# Patient Record
Sex: FEMALE | Race: WHITE | Employment: UNEMPLOYED | URBAN - METROPOLITAN AREA
[De-identification: names, ages, dates, MRNs, and addresses within clinical notes are randomized per-mention and may not be internally consistent; named-entity substitution may affect disease eponyms.]

---

## 2018-03-22 ENCOUNTER — APPOINTMENT (OUTPATIENT)
Dept: GENERAL RADIOLOGY | Age: 48
End: 2018-03-22
Payer: COMMERCIAL

## 2018-03-22 ENCOUNTER — HOSPITAL ENCOUNTER (EMERGENCY)
Age: 48
Discharge: HOME | End: 2018-03-22
Payer: COMMERCIAL

## 2018-03-22 VITALS
RESPIRATION RATE: 20 BRPM | OXYGEN SATURATION: 97 % | SYSTOLIC BLOOD PRESSURE: 139 MMHG | DIASTOLIC BLOOD PRESSURE: 84 MMHG | HEART RATE: 105 BPM | TEMPERATURE: 99.9 F

## 2018-03-22 DIAGNOSIS — J40 BRONCHITIS: ICD-10-CM

## 2018-03-22 DIAGNOSIS — J20.9 ACUTE BRONCHITIS, UNSPECIFIED ORGANISM: Primary | ICD-10-CM

## 2018-03-22 DIAGNOSIS — R05.9 COUGH: ICD-10-CM

## 2018-03-22 PROCEDURE — 71046 X-RAY EXAM CHEST 2 VIEWS: CPT

## 2018-03-22 PROCEDURE — 99212 OFFICE O/P EST SF 10 MIN: CPT

## 2018-03-22 RX ORDER — GUAIFENESIN AND DEXTROMETHORPHAN HYDROBROMIDE 1200; 60 MG/1; MG/1
1 TABLET, EXTENDED RELEASE ORAL EVERY 12 HOURS
Qty: 20 TABLET | Refills: 0 | Status: SHIPPED | OUTPATIENT
Start: 2018-03-22 | End: 2021-02-17

## 2018-03-22 RX ORDER — ALBUTEROL SULFATE 90 UG/1
2 AEROSOL, METERED RESPIRATORY (INHALATION) EVERY 6 HOURS PRN
Qty: 1 INHALER | Refills: 0 | Status: SHIPPED | OUTPATIENT
Start: 2018-03-22 | End: 2021-03-11

## 2018-03-22 RX ORDER — AZITHROMYCIN 250 MG/1
TABLET, FILM COATED ORAL
Qty: 6 TABLET | Refills: 0 | Status: SHIPPED | OUTPATIENT
Start: 2018-03-22 | End: 2018-04-01

## 2018-03-22 NOTE — ED PROVIDER NOTES
normocephalic. Eyes: No discharge is present from the eyes. The sclera are normal.  ENT: The oropharynx demonstrates a small amount of erythema bilaterally. There is no tonsillar enlargement nor is there any exudate present. No uvular deviation or edema. No tonsillary asymmetry.  Floor of the mouth soft, no trismus, handling secretions. TMs bilaterally demonstrate no evidence of infection. Neck: Normal range of motion is achieved in the neck. There is no JVD present. No meningeal signs are present   Anterior cervical adenopathy is negative. Respiratory/chest: The chest is nontender. Breath sounds are diminished. There is no respiratory distress.   Cardiovascular: Heart shows a regular rate and rhythm without murmurs clicks or gallops. Abdominal exam: The abdomen is non tender without evidence of peritoneal signs. Specific attention to the left upper quadrant with palpation of the spleen demonstrates no organomegaly or tenderness  Skin: warm and dry, without rash  Neurologic: GCS 15   Psych: Normal Affect  -------------------------------------------------- RESULTS -------------------------------------------------    LABS:  No results found for this visit on 03/22/18. RADIOLOGY:  Interpreted by Radiologist.  XR CHEST STANDARD (2 VW)   Final Result   Normal chest.               ------------------------------ ED COURSE/MEDICAL DECISION MAKING----------------------  Medications - No data to display    ED COURSE:  ED Course          Medical Decision Making:         Upper respiratory infection, sick for 2 weeks, Patient is a smoker. She was advised to stop smoking. Chest x-ray was negative. I will put her on a Z-Cristiano, Mucinex DM, and an albuterol inhaler.   IF she does  not improve or worsens she needs to follow up with her PCP or be reevaluated         This patient's ED course included: a personal history and physicial eaxmination and re-evaluation prior to disposition    This patient has remained hemodynamically stable during their ED course. Counseling: The emergency provider has spoken with the patient and discussed todays results, in addition to providing specific details for the plan of care and counseling regarding the diagnosis and prognosis. Questions are answered at this time and they are agreeable with the plan.       --------------------------------- IMPRESSION AND DISPOSITION ---------------------------------    IMPRESSION  1. Acute bronchitis, unspecified organism    2. Bronchitis    3.  Cough        DISPOSITION  Disposition: Discharge to home  Patient condition is good           Audrey Delvalle NP  03/27/18 [id number]

## 2020-10-05 ENCOUNTER — TELEPHONE (OUTPATIENT)
Dept: ADMINISTRATIVE | Age: 50
End: 2020-10-05

## 2020-10-05 NOTE — TELEPHONE ENCOUNTER
Pt called wanting to establish care as a NP with Dr. Suhas Law. Pt was advised that Dr. Suhas Law is scheduling out to April. Pt stated she was told to call in for an appt and also stated she would like to be seen earlier than April.  Please advise to schedule Pt

## 2020-10-26 ENCOUNTER — HOSPITAL ENCOUNTER (OUTPATIENT)
Age: 50
Discharge: HOME | End: 2020-10-28
Payer: COMMERCIAL

## 2020-10-26 ENCOUNTER — HOSPITAL ENCOUNTER (OUTPATIENT)
Dept: GENERAL RADIOLOGY | Age: 50
Discharge: HOME | End: 2020-10-28
Payer: COMMERCIAL

## 2020-10-26 LAB
ALBUMIN SERPL-MCNC: 4 G/DL (ref 3.5–5.2)
ALP BLD-CCNC: 125 U/L (ref 35–104)
ALT SERPL-CCNC: 49 U/L (ref 0–32)
ANION GAP SERPL CALCULATED.3IONS-SCNC: 12 MMOL/L (ref 7–16)
AST SERPL-CCNC: 61 U/L (ref 0–31)
BACTERIA: ABNORMAL /HPF
BASOPHILS ABSOLUTE: 0.09 E9/L (ref 0–0.2)
BASOPHILS RELATIVE PERCENT: 0.9 % (ref 0–2)
BILIRUB SERPL-MCNC: 0.5 MG/DL (ref 0–1.2)
BILIRUBIN URINE: NEGATIVE
BLOOD, URINE: NEGATIVE
BUN BLDV-MCNC: 8 MG/DL (ref 6–20)
C-REACTIVE PROTEIN: 1 MG/DL (ref 0–0.4)
CALCIUM SERPL-MCNC: 9.9 MG/DL (ref 8.6–10.2)
CHLORIDE BLD-SCNC: 102 MMOL/L (ref 98–107)
CHOLESTEROL, TOTAL: 234 MG/DL (ref 0–199)
CLARITY: CLEAR
CO2: 25 MMOL/L (ref 22–29)
COLOR: YELLOW
CREAT SERPL-MCNC: 0.8 MG/DL (ref 0.5–1)
EOSINOPHILS ABSOLUTE: 0.56 E9/L (ref 0.05–0.5)
EOSINOPHILS RELATIVE PERCENT: 5.7 % (ref 0–6)
EPITHELIAL CELLS, UA: ABNORMAL /HPF
GFR AFRICAN AMERICAN: >60
GFR NON-AFRICAN AMERICAN: >60 ML/MIN/1.73
GLUCOSE BLD-MCNC: 107 MG/DL (ref 74–99)
GLUCOSE URINE: NEGATIVE MG/DL
HCT VFR BLD CALC: 47.2 % (ref 34–48)
HDLC SERPL-MCNC: 41 MG/DL
HEMOGLOBIN: 15.1 G/DL (ref 11.5–15.5)
HOMOCYSTEINE: 11 UMOL/L (ref 0–15)
IMMATURE GRANULOCYTES #: 0.07 E9/L
IMMATURE GRANULOCYTES %: 0.7 % (ref 0–5)
KETONES, URINE: NEGATIVE MG/DL
LDL CHOLESTEROL CALCULATED: 162 MG/DL (ref 0–99)
LEUKOCYTE ESTERASE, URINE: ABNORMAL
LYMPHOCYTES ABSOLUTE: 3.36 E9/L (ref 1.5–4)
LYMPHOCYTES RELATIVE PERCENT: 34.3 % (ref 20–42)
MCH RBC QN AUTO: 31.1 PG (ref 26–35)
MCHC RBC AUTO-ENTMCNC: 32 % (ref 32–34.5)
MCV RBC AUTO: 97.3 FL (ref 80–99.9)
MONOCYTES ABSOLUTE: 0.72 E9/L (ref 0.1–0.95)
MONOCYTES RELATIVE PERCENT: 7.3 % (ref 2–12)
NEUTROPHILS ABSOLUTE: 5 E9/L (ref 1.8–7.3)
NEUTROPHILS RELATIVE PERCENT: 51.1 % (ref 43–80)
NITRITE, URINE: NEGATIVE
PDW BLD-RTO: 13.4 FL (ref 11.5–15)
PH UA: 6.5 (ref 5–9)
PLATELET # BLD: 264 E9/L (ref 130–450)
PMV BLD AUTO: 10 FL (ref 7–12)
POTASSIUM SERPL-SCNC: 4.9 MMOL/L (ref 3.5–5)
PROTEIN UA: NEGATIVE MG/DL
RBC # BLD: 4.85 E12/L (ref 3.5–5.5)
RBC UA: ABNORMAL /HPF (ref 0–2)
SODIUM BLD-SCNC: 139 MMOL/L (ref 132–146)
SPECIFIC GRAVITY UA: 1.02 (ref 1–1.03)
TOTAL PROTEIN: 7.5 G/DL (ref 6.4–8.3)
TRIGL SERPL-MCNC: 155 MG/DL (ref 0–149)
TSH SERPL DL<=0.05 MIU/L-ACNC: 4.92 UIU/ML (ref 0.27–4.2)
UROBILINOGEN, URINE: 0.2 E.U./DL
VITAMIN B-12: 731 PG/ML (ref 211–946)
VITAMIN D 25-HYDROXY: 35 NG/ML (ref 30–100)
VLDLC SERPL CALC-MCNC: 31 MG/DL
WBC # BLD: 9.8 E9/L (ref 4.5–11.5)
WBC UA: ABNORMAL /HPF (ref 0–5)

## 2020-10-26 PROCEDURE — 82607 VITAMIN B-12: CPT

## 2020-10-26 PROCEDURE — 72220 X-RAY EXAM SACRUM TAILBONE: CPT

## 2020-10-26 PROCEDURE — 81001 URINALYSIS AUTO W/SCOPE: CPT

## 2020-10-26 PROCEDURE — 36415 COLL VENOUS BLD VENIPUNCTURE: CPT

## 2020-10-26 PROCEDURE — 83090 ASSAY OF HOMOCYSTEINE: CPT

## 2020-10-26 PROCEDURE — 72100 X-RAY EXAM L-S SPINE 2/3 VWS: CPT

## 2020-10-26 PROCEDURE — 80053 COMPREHEN METABOLIC PANEL: CPT

## 2020-10-26 PROCEDURE — 82306 VITAMIN D 25 HYDROXY: CPT

## 2020-10-26 PROCEDURE — 86140 C-REACTIVE PROTEIN: CPT

## 2020-10-26 PROCEDURE — 83525 ASSAY OF INSULIN: CPT

## 2020-10-26 PROCEDURE — 85025 COMPLETE CBC W/AUTO DIFF WBC: CPT

## 2020-10-26 PROCEDURE — 84443 ASSAY THYROID STIM HORMONE: CPT

## 2020-10-26 PROCEDURE — 80061 LIPID PANEL: CPT

## 2020-10-30 LAB — INSULIN: 27 UIU/ML (ref 3–19)

## 2020-11-02 ENCOUNTER — HOSPITAL ENCOUNTER (OUTPATIENT)
Dept: MAMMOGRAPHY | Age: 50
Discharge: HOME | End: 2020-11-04
Payer: COMMERCIAL

## 2020-11-02 PROCEDURE — 77067 SCR MAMMO BI INCL CAD: CPT

## 2020-11-20 ENCOUNTER — HOSPITAL ENCOUNTER (OUTPATIENT)
Age: 50
Discharge: HOME | End: 2020-11-22
Payer: COMMERCIAL

## 2020-11-20 ENCOUNTER — HOSPITAL ENCOUNTER (OUTPATIENT)
Dept: GENERAL RADIOLOGY | Age: 50
Discharge: HOME | End: 2020-11-22
Payer: COMMERCIAL

## 2020-11-20 ENCOUNTER — HOSPITAL ENCOUNTER (OUTPATIENT)
Dept: ULTRASOUND IMAGING | Age: 50
Discharge: HOME | End: 2020-11-20
Payer: COMMERCIAL

## 2020-11-20 PROCEDURE — 76705 ECHO EXAM OF ABDOMEN: CPT

## 2020-11-20 PROCEDURE — 71046 X-RAY EXAM CHEST 2 VIEWS: CPT

## 2021-02-17 ENCOUNTER — INITIAL CONSULT (OUTPATIENT)
Dept: BARIATRICS/WEIGHT MGMT | Age: 51
End: 2021-02-17
Payer: COMMERCIAL

## 2021-02-17 VITALS
WEIGHT: 250.5 LBS | DIASTOLIC BLOOD PRESSURE: 80 MMHG | RESPIRATION RATE: 20 BRPM | SYSTOLIC BLOOD PRESSURE: 132 MMHG | TEMPERATURE: 97.4 F | HEART RATE: 97 BPM | BODY MASS INDEX: 42.76 KG/M2 | HEIGHT: 64 IN

## 2021-02-17 DIAGNOSIS — K30 INDIGESTION: Primary | ICD-10-CM

## 2021-02-17 DIAGNOSIS — K21.9 GASTROESOPHAGEAL REFLUX DISEASE WITHOUT ESOPHAGITIS: ICD-10-CM

## 2021-02-17 DIAGNOSIS — E66.01 MORBID OBESITY DUE TO EXCESS CALORIES (HCC): ICD-10-CM

## 2021-02-17 PROCEDURE — G8417 CALC BMI ABV UP PARAM F/U: HCPCS | Performed by: SURGERY

## 2021-02-17 PROCEDURE — 99204 OFFICE O/P NEW MOD 45 MIN: CPT | Performed by: SURGERY

## 2021-02-17 PROCEDURE — G8427 DOCREV CUR MEDS BY ELIG CLIN: HCPCS | Performed by: SURGERY

## 2021-02-17 PROCEDURE — G8484 FLU IMMUNIZE NO ADMIN: HCPCS | Performed by: SURGERY

## 2021-02-17 PROCEDURE — 4004F PT TOBACCO SCREEN RCVD TLK: CPT | Performed by: SURGERY

## 2021-02-17 PROCEDURE — 99202 OFFICE O/P NEW SF 15 MIN: CPT

## 2021-02-17 RX ORDER — MULTIVITAMIN WITH IRON
250 TABLET ORAL DAILY
COMMUNITY
End: 2021-11-09

## 2021-02-17 RX ORDER — LURASIDONE HYDROCHLORIDE 60 MG/1
60 TABLET, FILM COATED ORAL EVERY EVENING
COMMUNITY
Start: 2021-01-29

## 2021-02-17 RX ORDER — DULOXETIN HYDROCHLORIDE 30 MG/1
30 CAPSULE, DELAYED RELEASE ORAL
COMMUNITY
Start: 2021-02-03 | End: 2021-12-01

## 2021-02-17 RX ORDER — DULOXETIN HYDROCHLORIDE 60 MG/1
60 CAPSULE, DELAYED RELEASE ORAL NIGHTLY
COMMUNITY
Start: 2021-01-28 | End: 2021-12-01

## 2021-02-17 RX ORDER — OMEPRAZOLE 40 MG/1
40 CAPSULE, DELAYED RELEASE ORAL DAILY
Status: ON HOLD | COMMUNITY
Start: 2021-02-09 | End: 2021-11-15

## 2021-02-17 RX ORDER — LEVOTHYROXINE SODIUM 0.03 MG/1
25 TABLET ORAL DAILY
COMMUNITY
Start: 2021-02-03

## 2021-02-17 RX ORDER — LISINOPRIL 5 MG/1
5 TABLET ORAL DAILY
Status: ON HOLD | COMMUNITY
Start: 2021-02-03 | End: 2021-11-18 | Stop reason: HOSPADM

## 2021-02-17 RX ORDER — HYDROXYZINE PAMOATE 50 MG/1
50 CAPSULE ORAL 3 TIMES DAILY
COMMUNITY
Start: 2021-01-18

## 2021-02-17 RX ORDER — ASCORBIC ACID 1000 MG
1 TABLET ORAL DAILY
COMMUNITY
End: 2021-11-09

## 2021-02-17 RX ORDER — TRAZODONE HYDROCHLORIDE 100 MG/1
100 TABLET ORAL NIGHTLY PRN
COMMUNITY
Start: 2021-01-29

## 2021-02-17 NOTE — PROGRESS NOTES
 traZODone (DESYREL) 100 MG tablet Take 100 tablets by mouth nightly as needed      Coenzyme Q10 (CO Q 10) 10 MG CAPS Take 1 capsule by mouth daily      magnesium (MAGNESIUM-OXIDE) 250 MG TABS tablet Take 250 mg by mouth daily      Calcium Carb-Cholecalciferol (CALCIUM 600 + D PO) Take 1 tablet by mouth daily      Multiple Vitamins-Minerals (ONE-A-DAY FOR HER VITACRAVES PO) Take 1 tablet by mouth daily      DULoxetine (CYMBALTA) 60 MG extended release capsule Take 60 mg by mouth nightly      albuterol sulfate HFA (PROAIR HFA) 108 (90 Base) MCG/ACT inhaler Inhale 2 puffs into the lungs every 6 hours as needed for Wheezing 1 Inhaler 0    pravastatin (PRAVACHOL) 20 MG tablet Take 20 mg by mouth daily      cyclobenzaprine (FLEXERIL) 10 MG tablet Take 10 mg by mouth daily       No current facility-administered medications for this visit. Social History:   TOBACCO:   reports that she has been smoking cigarettes. She has never used smokeless tobacco.  All smokers must join the free smoking cessation program and stop smoking for 3 months before having any Bariatric surgery. ETOH:    reports no history of alcohol use. The patient has a family history that is negative for severe cardiovascular or respiratory issues, negative for reaction to anesthesia.       Review of Systems    Review of Systems - General ROS: negative for - chills, fatigue or malaise  ENT ROS: negative for - hearing change, nasal congestion or nasal discharge  Allergy and Immunology ROS: negative for - hives, itchy/watery eyes or nasal congestion  Hematological and Lymphatic ROS: negative for - blood clots, blood transfusions, bruising or fatigue  Endocrine ROS: negative for - malaise/lethargy, mood swings, palpitations or polydipsia/polyuria  Breast ROS: negative for - new or changing breast lumps or nipple changes  Respiratory ROS: negative for - sputum changes, stridor, tachypnea or wheezing Send copy of H&P to PCP, Christiano Kruger MD

## 2021-02-22 ENCOUNTER — PREP FOR PROCEDURE (OUTPATIENT)
Dept: SURGERY | Age: 51
End: 2021-02-22

## 2021-02-22 RX ORDER — SODIUM CHLORIDE, SODIUM LACTATE, POTASSIUM CHLORIDE, CALCIUM CHLORIDE 600; 310; 30; 20 MG/100ML; MG/100ML; MG/100ML; MG/100ML
INJECTION, SOLUTION INTRAVENOUS CONTINUOUS
Status: CANCELLED | OUTPATIENT
Start: 2021-02-22

## 2021-03-08 ENCOUNTER — HOSPITAL ENCOUNTER (OUTPATIENT)
Age: 51
Discharge: HOME | End: 2021-03-10
Payer: COMMERCIAL

## 2021-03-08 DIAGNOSIS — Z01.818 PREOP TESTING: ICD-10-CM

## 2021-03-08 PROCEDURE — U0003 INFECTIOUS AGENT DETECTION BY NUCLEIC ACID (DNA OR RNA); SEVERE ACUTE RESPIRATORY SYNDROME CORONAVIRUS 2 (SARS-COV-2) (CORONAVIRUS DISEASE [COVID-19]), AMPLIFIED PROBE TECHNIQUE, MAKING USE OF HIGH THROUGHPUT TECHNOLOGIES AS DESCRIBED BY CMS-2020-01-R: HCPCS

## 2021-03-10 LAB
SARS-COV-2: NOT DETECTED
SOURCE: NORMAL

## 2021-03-11 ENCOUNTER — ANESTHESIA EVENT (OUTPATIENT)
Dept: ENDOSCOPY | Age: 51
End: 2021-03-11
Payer: COMMERCIAL

## 2021-03-11 NOTE — PROGRESS NOTES
[address] [facility]                                                                                                                    PRE OP INSTRUCTIONS FOR  Barb Rosas        Date: 3/11/2021    Date of surgery: 03/12/2021   Arrival Time: Hospital will call you between 5pm and 7pm with your final arrival time for surgery    1. Do not eat or drink anything after midnight prior to surgery. This includes no water, chewing gum, mints or ice chips. 2. Take the following medications with a small sip of water on the morning of Surgery: duloxetine-synthroid-flexeril     3. Diabetics may take evening dose of insulin but none after midnight. If you feel symptomatic or low blood sugar morning of surgery drink 1-2 ounces of apple juice only. 4. Aspirin, Ibuprofen, Advil, Naproxen, Vitamin E and other Anti-inflammatory products should be stopped  before surgery  as directed by your physician. Take Tylenol only unless instructed otherwise by your surgeon. 5. Check with your Doctor regarding stopping Plavix, Coumadin, Lovenox, Eliquis, Effient, or other blood thinners. 6. Do not smoke,use illicit drugs and do not drink any alcoholic beverages 24 hours prior to surgery. 7. You may brush your teeth the morning of surgery. DO NOT SWALLOW WATER    8. You MUST make arrangements for a responsible adult to take you home after your surgery. You will not be allowed to leave alone or drive yourself home. It is strongly suggested someone stay with you the first 24 hrs. Your surgery will be cancelled if you do not have a ride home. 9. PEDIATRIC PATIENTS ONLY:  A parent/legal guardian must accompany a child scheduled for surgery and plan to stay at the hospital until the child is discharged. Please do not bring other children with you.     10. Please wear simple, loose fitting clothing to the hospital.  Sigrid Ortega not bring valuables (money, credit cards, checkbooks, etc.) Do not wear any makeup (including no eye makeup) or nail polish on your fingers or toes. 11. DO NOT wear any jewelry or piercings on day of surgery. All body piercing jewelry must be removed. 12. Shower the night before surgery with ___Antibacterial soap /DENVER WIPES________    13. TOTAL JOINT REPLACEMENT/HYSTERECTOMY PATIENTS ONLY---Remember to bring Blood Bank bracelet to the hospital on the day of surgery. 14. If you have a Living Will and Durable Power of  for Healthcare, please bring in a copy. 15. If appropriate bring crutches, inspirex, WALKER, CANE etc... 12. Notify your Surgeon if you develop any illness between now and surgery time, cough, cold, fever, sore throat, nausea, vomiting, etc.  Please notify your surgeon if you experience dizziness, shortness of breath or blurred vision between now & the time of your surgery. 17. If you have ___dentures, they will be removed before going to the OR; we will provide you a container. If you wear ___contact lenses or ___glasses, they will be removed; please bring a case for them. 18. To provide excellent care visitors will be limited to 2 in the room at any given time. 19. Please bring picture ID and insurance card. 20. Sleep apnea patients need to bring CPAP AND SETTINGS to hospital on day of surgery. 21. During flu season no children under the age of 15 are permitted in the hospital for the safety of all patients. 22. The day of your procedure please report to the main lobby information desk and you will be directed where to go. Please call AMBULATORY CARE if you have any further questions.    [facility]     [address]

## 2021-03-11 NOTE — ANESTHESIA PRE PROCEDURE
Department of Anesthesiology  Preprocedure Note       Name:  Merrianne Dakins   Age:  53 y.o.  :  1970                                          MRN:  [id number]         Date:  3/11/2021      Surgeon: Lauren Scott):  Jennifer More MD    Procedure: Procedure(s):  EGD ESOPHAGOGASTRODUODENOSCOPY    Medications prior to admission:   Prior to Admission medications    Medication Sig Start Date End Date Taking?  Authorizing Provider   lisinopril (PRINIVIL;ZESTRIL) 5 MG tablet Take 5 mg by mouth daily 2/3/21  Yes Historical Provider, MD   metFORMIN (GLUCOPHAGE) 500 MG tablet Take 500 mg by mouth daily 2/3/21  Yes Historical Provider, MD   levothyroxine (SYNTHROID) 25 MCG tablet Take 25 mcg by mouth daily 2/3/21  Yes Historical Provider, MD   DULoxetine (CYMBALTA) 30 MG extended release capsule Take 30 mg by mouth every morning (before breakfast) 2/3/21  Yes Historical Provider, MD   hydrOXYzine (VISTARIL) 50 MG capsule Take 50 mg by mouth 3 times daily 21  Yes Historical Provider, MD   LATUDA 60 MG TABS tablet Take 60 mg by mouth every evening 21  Yes Historical Provider, MD   omeprazole (PRILOSEC) 40 MG delayed release capsule Take 40 mg by mouth daily 21  Yes Historical Provider, MD   traZODone (DESYREL) 100 MG tablet Take 100 tablets by mouth nightly as needed 21  Yes Historical Provider, MD   Coenzyme Q10 (CO Q 10) 10 MG CAPS Take 1 capsule by mouth daily   Yes Historical Provider, MD   magnesium (MAGNESIUM-OXIDE) 250 MG TABS tablet Take 250 mg by mouth daily   Yes Historical Provider, MD   Calcium Carb-Cholecalciferol (CALCIUM 600 + D PO) Take 1 tablet by mouth daily   Yes Historical Provider, MD   Multiple Vitamins-Minerals (ONE-A-DAY FOR HER VITACRAVES PO) Take 1 tablet by mouth daily   Yes Historical Provider, MD   DULoxetine (CYMBALTA) 60 MG extended release capsule Take 60 mg by mouth nightly 21  Yes Historical Provider, MD   pravastatin (PRAVACHOL) 20 MG tablet Take 20 mg by mouth daily Yes Historical Provider, MD   cyclobenzaprine (FLEXERIL) 10 MG tablet Take 10 mg by mouth daily   Yes Historical Provider, MD       Current medications:    No current facility-administered medications for this encounter. Current Outpatient Medications   Medication Sig Dispense Refill    lisinopril (PRINIVIL;ZESTRIL) 5 MG tablet Take 5 mg by mouth daily      metFORMIN (GLUCOPHAGE) 500 MG tablet Take 500 mg by mouth daily      levothyroxine (SYNTHROID) 25 MCG tablet Take 25 mcg by mouth daily      DULoxetine (CYMBALTA) 30 MG extended release capsule Take 30 mg by mouth every morning (before breakfast)      hydrOXYzine (VISTARIL) 50 MG capsule Take 50 mg by mouth 3 times daily      LATUDA 60 MG TABS tablet Take 60 mg by mouth every evening      omeprazole (PRILOSEC) 40 MG delayed release capsule Take 40 mg by mouth daily      traZODone (DESYREL) 100 MG tablet Take 100 tablets by mouth nightly as needed      Coenzyme Q10 (CO Q 10) 10 MG CAPS Take 1 capsule by mouth daily      magnesium (MAGNESIUM-OXIDE) 250 MG TABS tablet Take 250 mg by mouth daily      Calcium Carb-Cholecalciferol (CALCIUM 600 + D PO) Take 1 tablet by mouth daily      Multiple Vitamins-Minerals (ONE-A-DAY FOR HER VITACRAVES PO) Take 1 tablet by mouth daily      DULoxetine (CYMBALTA) 60 MG extended release capsule Take 60 mg by mouth nightly      pravastatin (PRAVACHOL) 20 MG tablet Take 20 mg by mouth daily      cyclobenzaprine (FLEXERIL) 10 MG tablet Take 10 mg by mouth daily         Allergies:  No Known Allergies    Problem List:  There is no problem list on file for this patient.       Past Medical History:        Diagnosis Date    Depression     Hyperlipidemia     Morbid (severe) obesity due to excess calories ([address]        Past Surgical History:        Procedure Laterality Date    CARDIAC CATHETERIZATION      CARPAL TUNNEL RELEASE      TONSILLECTOMY AND ADENOIDECTOMY         Social History:    Social History     Tobacco Use  Smoking status: Current Every Day Smoker     Types: Cigarettes    Smokeless tobacco: Never Used   Substance Use Topics    Alcohol use: No                                Ready to quit: Not Answered  Counseling given: Not Answered      Vital Signs (Current): There were no vitals filed for this visit. BP Readings from Last 3 Encounters:   02/17/21 132/80   03/22/18 139/84   09/10/15 144/64       NPO Status:                                                                                 BMI:   Wt Readings from Last 3 Encounters:   02/17/21 250 lb 8 oz (113.6 kg)   09/10/15 242 lb (109.8 kg)     There is no height or weight on file to calculate BMI.    CBC:   Lab Results   Component Value Date    WBC 9.8 10/26/2020    RBC 4.85 10/26/2020    HGB 15.1 10/26/2020    HCT 47.2 10/26/2020    MCV 97.3 10/26/2020    RDW 13.4 10/26/2020     10/26/2020       CMP:   Lab Results   Component Value Date     10/26/2020    K 4.9 10/26/2020     10/26/2020    CO2 25 10/26/2020    BUN 8 10/26/2020    CREATININE 0.8 10/26/2020    GFRAA >60 10/26/2020    LABGLOM >60 10/26/2020    GLUCOSE 107 10/26/2020    PROT 7.5 10/26/2020    CALCIUM 9.9 10/26/2020    BILITOT 0.5 10/26/2020    ALKPHOS 125 10/26/2020    AST 61 10/26/2020    ALT 49 10/26/2020       POC Tests: No results for input(s): POCGLU, POCNA, POCK, POCCL, POCBUN, POCHEMO, POCHCT in the last 72 hours.     Coags: No results found for: PROTIME, INR, APTT    HCG (If Applicable): No results found for: PREGTESTUR, PREGSERUM, HCG, HCGQUANT     ABGs: No results found for: PHART, PO2ART, UUP4AVI, LYX3UCR, BEART, S7FAOFOR     Type & Screen (If Applicable):  No results found for: LABABO, LABRH    Drug/Infectious Status (If Applicable):  No results found for: HIV, HEPCAB    COVID-19 Screening (If Applicable):   Lab Results   Component Value Date    COVID19 Not Detected 03/08/2021         Anesthesia Evaluation  Patient summary reviewed and Nursing notes reviewed no history of anesthetic complications:   Airway: Mallampati: III  TM distance: >3 FB   Neck ROM: full  Mouth opening: > = 3 FB Dental:          Pulmonary:Negative Pulmonary ROS and normal exam                               Cardiovascular:  Exercise tolerance: good (>4 METS),   (+) hypertension: mild, murmur, hyperlipidemia        Rhythm: regular  Rate: normal           Beta Blocker:  Not on Beta Blocker      ROS comment: Pt. Denies anginal symptoms, respiratory difficulties, major physical restrictions or any recent changes in functional capacity and is at baseline. Neuro/Psych:   (+) psychiatric history: stable with treatmentdepression/anxiety             GI/Hepatic/Renal:   (+) GERD:, morbid obesity (BMI 43 kg/m2)          Endo/Other:    (+) DiabetesType II DM, , .          Pt had no PAT visit       Abdominal:   (+) obese,         Vascular: negative vascular ROS. Anesthesia Plan      MAC     ASA 3       Induction: intravenous. MIPS: Prophylactic antiemetics administered. Anesthetic plan and risks discussed with patient. Plan discussed with CRNA.                   Cali Gibson DO   3/11/2021

## 2021-03-12 ENCOUNTER — HOSPITAL ENCOUNTER (OUTPATIENT)
Dept: ULTRASOUND IMAGING | Age: 51
Discharge: HOME | End: 2021-03-12
Attending: SURGERY
Payer: COMMERCIAL

## 2021-03-12 ENCOUNTER — HOSPITAL ENCOUNTER (OUTPATIENT)
Age: 51
Setting detail: OUTPATIENT SURGERY
Discharge: HOME | End: 2021-03-12
Attending: SURGERY | Admitting: SURGERY
Payer: COMMERCIAL

## 2021-03-12 ENCOUNTER — ANESTHESIA (OUTPATIENT)
Dept: ENDOSCOPY | Age: 51
End: 2021-03-12
Payer: COMMERCIAL

## 2021-03-12 VITALS
HEART RATE: 85 BPM | DIASTOLIC BLOOD PRESSURE: 71 MMHG | TEMPERATURE: 97.8 F | HEIGHT: 64 IN | WEIGHT: 253 LBS | BODY MASS INDEX: 43.19 KG/M2 | OXYGEN SATURATION: 95 % | RESPIRATION RATE: 16 BRPM | SYSTOLIC BLOOD PRESSURE: 114 MMHG

## 2021-03-12 VITALS — OXYGEN SATURATION: 97 % | DIASTOLIC BLOOD PRESSURE: 76 MMHG | SYSTOLIC BLOOD PRESSURE: 132 MMHG

## 2021-03-12 DIAGNOSIS — K30 INDIGESTION: ICD-10-CM

## 2021-03-12 DIAGNOSIS — Z01.818 PREOP TESTING: Primary | ICD-10-CM

## 2021-03-12 DIAGNOSIS — E66.01 MORBID OBESITY DUE TO EXCESS CALORIES (HCC): ICD-10-CM

## 2021-03-12 LAB
ALBUMIN SERPL-MCNC: 4 G/DL (ref 3.5–5.2)
ALP BLD-CCNC: 113 U/L (ref 35–104)
ALT SERPL-CCNC: 36 U/L (ref 0–32)
ANION GAP SERPL CALCULATED.3IONS-SCNC: 9 MMOL/L (ref 7–16)
AST SERPL-CCNC: 35 U/L (ref 0–31)
BILIRUB SERPL-MCNC: 0.2 MG/DL (ref 0–1.2)
BUN BLDV-MCNC: 10 MG/DL (ref 6–20)
CALCIUM SERPL-MCNC: 9.7 MG/DL (ref 8.6–10.2)
CHLORIDE BLD-SCNC: 103 MMOL/L (ref 98–107)
CHOLESTEROL, TOTAL: 188 MG/DL (ref 0–199)
CO2: 28 MMOL/L (ref 22–29)
CREAT SERPL-MCNC: 0.8 MG/DL (ref 0.5–1)
FERRITIN: 80 NG/ML
FOLATE: >20 NG/ML (ref 4.8–24.2)
GFR AFRICAN AMERICAN: >60
GFR NON-AFRICAN AMERICAN: >60 ML/MIN/1.73
GLUCOSE BLD-MCNC: 113 MG/DL (ref 74–99)
HBA1C MFR BLD: 5.5 % (ref 4–5.6)
HCG(URINE) PREGNANCY TEST: NEGATIVE
HCT VFR BLD CALC: 42.6 % (ref 34–48)
HDLC SERPL-MCNC: 41 MG/DL
HEMOGLOBIN: 14 G/DL (ref 11.5–15.5)
LDL CHOLESTEROL CALCULATED: 115 MG/DL (ref 0–99)
MCH RBC QN AUTO: 31 PG (ref 26–35)
MCHC RBC AUTO-ENTMCNC: 32.9 % (ref 32–34.5)
MCV RBC AUTO: 94.5 FL (ref 80–99.9)
METER GLUCOSE: 119 MG/DL (ref 74–99)
PDW BLD-RTO: 13.7 FL (ref 11.5–15)
PLATELET # BLD: 238 E9/L (ref 130–450)
PMV BLD AUTO: 9 FL (ref 7–12)
POTASSIUM SERPL-SCNC: 4.4 MMOL/L (ref 3.5–5)
RBC # BLD: 4.51 E12/L (ref 3.5–5.5)
SODIUM BLD-SCNC: 140 MMOL/L (ref 132–146)
T4 FREE: 1.11 NG/DL (ref 0.93–1.7)
TOTAL PROTEIN: 7.3 G/DL (ref 6.4–8.3)
TRIGL SERPL-MCNC: 162 MG/DL (ref 0–149)
TSH SERPL DL<=0.05 MIU/L-ACNC: 3.84 UIU/ML (ref 0.27–4.2)
VITAMIN B-12: 766 PG/ML (ref 211–946)
VLDLC SERPL CALC-MCNC: 32 MG/DL
WBC # BLD: 9.8 E9/L (ref 4.5–11.5)

## 2021-03-12 PROCEDURE — 88305 TISSUE EXAM BY PATHOLOGIST: CPT

## 2021-03-12 PROCEDURE — 2580000003 HC RX 258: Performed by: SURGERY

## 2021-03-12 PROCEDURE — 36415 COLL VENOUS BLD VENIPUNCTURE: CPT

## 2021-03-12 PROCEDURE — 99024 POSTOP FOLLOW-UP VISIT: CPT | Performed by: SURGERY

## 2021-03-12 PROCEDURE — 85027 COMPLETE CBC AUTOMATED: CPT

## 2021-03-12 PROCEDURE — 3609012400 HC EGD TRANSORAL BIOPSY SINGLE/MULTIPLE: Performed by: SURGERY

## 2021-03-12 PROCEDURE — 6360000002 HC RX W HCPCS: Performed by: ANESTHESIOLOGY

## 2021-03-12 PROCEDURE — 82962 GLUCOSE BLOOD TEST: CPT

## 2021-03-12 PROCEDURE — 2709999900 HC NON-CHARGEABLE SUPPLY: Performed by: SURGERY

## 2021-03-12 PROCEDURE — 84443 ASSAY THYROID STIM HORMONE: CPT

## 2021-03-12 PROCEDURE — 81025 URINE PREGNANCY TEST: CPT

## 2021-03-12 PROCEDURE — 80061 LIPID PANEL: CPT

## 2021-03-12 PROCEDURE — 82607 VITAMIN B-12: CPT

## 2021-03-12 PROCEDURE — 43239 EGD BIOPSY SINGLE/MULTIPLE: CPT | Performed by: SURGERY

## 2021-03-12 PROCEDURE — 82728 ASSAY OF FERRITIN: CPT

## 2021-03-12 PROCEDURE — 84439 ASSAY OF FREE THYROXINE: CPT

## 2021-03-12 PROCEDURE — 82746 ASSAY OF FOLIC ACID SERUM: CPT

## 2021-03-12 PROCEDURE — 83036 HEMOGLOBIN GLYCOSYLATED A1C: CPT

## 2021-03-12 PROCEDURE — 3700000000 HC ANESTHESIA ATTENDED CARE: Performed by: SURGERY

## 2021-03-12 PROCEDURE — 7100000011 HC PHASE II RECOVERY - ADDTL 15 MIN: Performed by: SURGERY

## 2021-03-12 PROCEDURE — 82306 VITAMIN D 25 HYDROXY: CPT

## 2021-03-12 PROCEDURE — 7100000010 HC PHASE II RECOVERY - FIRST 15 MIN: Performed by: SURGERY

## 2021-03-12 PROCEDURE — 84425 ASSAY OF VITAMIN B-1: CPT

## 2021-03-12 PROCEDURE — 84630 ASSAY OF ZINC: CPT

## 2021-03-12 PROCEDURE — 3700000001 HC ADD 15 MINUTES (ANESTHESIA): Performed by: SURGERY

## 2021-03-12 PROCEDURE — 88342 IMHCHEM/IMCYTCHM 1ST ANTB: CPT

## 2021-03-12 PROCEDURE — 76705 ECHO EXAM OF ABDOMEN: CPT

## 2021-03-12 PROCEDURE — 80053 COMPREHEN METABOLIC PANEL: CPT

## 2021-03-12 RX ORDER — SODIUM CHLORIDE, SODIUM LACTATE, POTASSIUM CHLORIDE, CALCIUM CHLORIDE 600; 310; 30; 20 MG/100ML; MG/100ML; MG/100ML; MG/100ML
INJECTION, SOLUTION INTRAVENOUS CONTINUOUS
Status: DISCONTINUED | OUTPATIENT
Start: 2021-03-12 | End: 2021-03-12 | Stop reason: HOSPADM

## 2021-03-12 RX ORDER — HYDROCODONE BITARTRATE AND ACETAMINOPHEN 5; 325 MG/1; MG/1
1 TABLET ORAL
Status: DISCONTINUED | OUTPATIENT
Start: 2021-03-12 | End: 2021-03-12 | Stop reason: HOSPADM

## 2021-03-12 RX ORDER — PROPOFOL 10 MG/ML
INJECTION, EMULSION INTRAVENOUS PRN
Status: DISCONTINUED | OUTPATIENT
Start: 2021-03-12 | End: 2021-03-12 | Stop reason: SDUPTHER

## 2021-03-12 RX ADMIN — PROPOFOL 40 MG: 10 INJECTION, EMULSION INTRAVENOUS at 07:00

## 2021-03-12 RX ADMIN — PROPOFOL 50 MG: 10 INJECTION, EMULSION INTRAVENOUS at 06:59

## 2021-03-12 RX ADMIN — SODIUM CHLORIDE, POTASSIUM CHLORIDE, SODIUM LACTATE AND CALCIUM CHLORIDE: 600; 310; 30; 20 INJECTION, SOLUTION INTRAVENOUS at 06:38

## 2021-03-12 RX ADMIN — PROPOFOL 40 MG: 10 INJECTION, EMULSION INTRAVENOUS at 07:01

## 2021-03-12 RX ADMIN — SODIUM CHLORIDE, POTASSIUM CHLORIDE, SODIUM LACTATE AND CALCIUM CHLORIDE: 600; 310; 30; 20 INJECTION, SOLUTION INTRAVENOUS at 06:52

## 2021-03-12 ASSESSMENT — PAIN SCALES - GENERAL
PAINLEVEL_OUTOF10: 0
PAINLEVEL_OUTOF10: 0

## 2021-03-12 NOTE — H&P
Initial Evaluation  Bariatric Surgery and EGD       Subjective:  CHIEF COMPLAINT: Morbid obesity, malnutrition, Type 2 Diabetes Mellitus, Hypertension, Hyperlipidemia, GERD and Degenerative Joint Disease (DJD)     HISTORY OF PRESENT ILLNESS: Barb Diamond is a morbidly-obese 53 y.o.  female, who weighs 250 lb 8 oz (113.6 kg). She is 98 pounds over her ideal body weight. The Body mass index is 43 kg/m². She has multiple medical problems aggravated by her obesity. She wishes to have bariatric surgery so that she can lose a large amount of weight and keep the weight off. I have met with her in the [facility] where we discussed the surgery in great detail and went over the risks and benefits. She has watched our informational video so she understands all of the extensive risks involved.  She states that she understands all of the risks and wishes to proceed with the evaluation.     Past Medical HistoryThere is no problem list on file for this patient.     Past Surgical History  Past Surgical History         Past Surgical History:   Procedure Laterality Date    CARDIAC CATHETERIZATION        CARPAL TUNNEL RELEASE        TONSILLECTOMY AND ADENOIDECTOMY             Allergies: Patient has no known allergies.      Home Medications  Current Facility-Administered Medications          Current Outpatient Medications   Medication Sig Dispense Refill    lisinopril (PRINIVIL;ZESTRIL) 5 MG tablet Take 5 mg by mouth daily        metFORMIN (GLUCOPHAGE) 500 MG tablet Take 500 mg by mouth daily        levothyroxine (SYNTHROID) 25 MCG tablet Take 25 mcg by mouth daily        DULoxetine (CYMBALTA) 30 MG extended release capsule Take 30 mg by mouth every morning (before breakfast)        hydrOXYzine (VISTARIL) 50 MG capsule Take 50 mg by mouth 3 times daily        LATUDA 60 MG TABS tablet Take 60 mg by mouth every evening        omeprazole (PRILOSEC) 40 MG delayed release capsule Take 40 mg by mouth daily ROS: negative for - irregular heartbeat, loss of consciousness, murmur or orthopnea  Gastrointestinal ROS: negative for - constipation, diarrhea, gas/bloating, heartburn or hematemesis  Genito-Urinary ROS: negative for - genital discharge, genital ulcers or hematuria  Musculoskeletal ROS: negative for - gait disturbance, muscle pain or muscular weakness}     Physical Exam:   /83   Pulse 86   Temp 97.8 °F (36.6 °C) (Oral)   Resp 18   Ht 5' 4\" (1.626 m)   Wt 253 lb (114.8 kg)   LMP 02/07/2021   SpO2 95%   BMI 43.43 kg/m²     General appearance: alert, appears stated age and cooperative  Head: Normocephalic, without obvious abnormality, atraumatic  Neck: no adenopathy, no carotid bruit, no JVD, supple, symmetrical, trachea midline and thyroid not enlarged, symmetric, no tenderness/mass/nodules  Lungs: clear to auscultation bilaterally  Heart: regular rate and rhythm  Abdomen: soft, non-tender; bowel sounds normal; no masses,  no organomegaly  Extremities: extremities normal, atraumatic, no cyanosis or edema  : no CVA tenderness     Assessment:  Morbid obesity with inability to keep the weight off with diet and exercise. She is interested in Laparoscopic Randy-en- Y Gastric Bypass or Sleeve Gastrectomy. We discussed that our goal is to ameliorate the medical problems and not to obtain a specific body mass index. She understands the risks and benefits, and wishes to proceed with the evaluation.     Plan:  Psych evaluation, medical and cardio/pulmonary clearance, mammogram, pap test, gallbladder usg is done. These patients often have vitamin deficiencies so I will do screening labs for malnutrition.  I will do an upper endoscopy to check for hiatal hernias, ulcers and H. Pylori while we wait for insurance approval for the surgery.     Physician Signature: Electronically signed by Dr. Dann Snyder MD

## 2021-03-12 NOTE — OP NOTE
SURGEON: Yuliya Leija M.D. PREOPERATIVE DIAGNOSES:  gerd    POSTOPERATIVE DIAGNOSES: 4cm hiatal hernia     OPERATION: [id number] with biopsy    BLOOD LOSS: 0ML    ANESTHESIA: LMAC    COMPLICATIONS: None. OPERATIONS: The patient was placed on the table in left lateral decubitus position and sedated. Bite block was placed. A lubricated scope was easily passed into the upper esophagus which looked normal. The distal esophagus looked normal. The scope was passed into the stomach and retroflexed. There was 4cm hiatal hernia. The scope was passed down toward the pylorus. The antral mucosa all looked normal. Biopsy was taken to check for H. pylori. The scope was then passed through the pylorus into the duodenal bulb which looked normal;, then around to the distal duodenum which looked normal, and the scope was then withdrawn. The GE junction was at 38 cm from the teeth. The patient tolerated the procedure well.      Physician Signature: Electronically signed by Dr. Karena Gregory

## 2021-03-12 NOTE — ANESTHESIA POSTPROCEDURE EVALUATION
Department of Anesthesiology  Postprocedure Note    Patient: Duane Dykes  MRN: [id number]  [place]: 1970  Date of evaluation: 3/12/2021  Time:  7:23 AM     Procedure Summary     Date: 03/12/21 Room / Location: [address] [place] [address] / [address] [place] [address]    Anesthesia Start: 8316 Anesthesia Stop: 6117    Procedure: EGD BIOPSY (N/A ) Diagnosis: (GERD)    Surgeons: Karma Anderson MD Responsible Provider: Jihan Rojas DO    Anesthesia Type: MAC ASA Status: 3          Anesthesia Type: MAC    David Phase I: David Score: 10    David Phase II:      Last vitals: Reviewed and per EMR flowsheets.        Anesthesia Post Evaluation    Patient location during evaluation: bedside  Patient participation: complete - patient participated  Level of consciousness: awake  Pain score: 1  Airway patency: patent  Nausea & Vomiting: no vomiting and no nausea  Complications: no  Cardiovascular status: hemodynamically stable  Respiratory status: acceptable  Hydration status: stable

## 2021-03-13 LAB — VITAMIN D 25-HYDROXY: 39 NG/ML (ref 30–100)

## 2021-03-14 LAB — ZINC: 69.4 UG/DL (ref 60–120)

## 2021-03-17 LAB — VITAMIN B1 WHOLE BLOOD: 228 NMOL/L (ref 70–180)

## 2021-03-19 ENCOUNTER — INITIAL CONSULT (OUTPATIENT)
Dept: BARIATRICS/WEIGHT MGMT | Age: 51
End: 2021-03-19
Payer: COMMERCIAL

## 2021-03-19 VITALS — BODY MASS INDEX: 42.85 KG/M2 | HEIGHT: 64 IN | WEIGHT: 251 LBS

## 2021-03-19 DIAGNOSIS — Z71.3 NUTRITIONAL COUNSELING: Primary | ICD-10-CM

## 2021-03-19 DIAGNOSIS — Z01.818 PRE-OP EVALUATION: Primary | ICD-10-CM

## 2021-03-19 DIAGNOSIS — Z00.8 NUTRITIONAL ASSESSMENT: ICD-10-CM

## 2021-03-19 PROCEDURE — 99999 PR OFFICE/OUTPT VISIT,PROCEDURE ONLY: CPT | Performed by: DIETITIAN, REGISTERED

## 2021-03-19 NOTE — PROGRESS NOTES
weeks after sx. Patient consumes the following beverage daily? Coffee    Pre-Op Labs - 3/12/21 - Glucose 113H, Alk Phos 113H, ALT 30H, AST 35H, Tri 162H, LDL 115H, Vitamin B1 228H, HGBA1C 5.5 WNL, Ferritin 80 WNL, H/ H - 14.0 / 42.6 - WNL    Patient states he / she has the following problems:  no - Eating  no - Chewing  no - Swallowing  no - Nausea  no - Vomiting  no - Diarrhea  no - Constipation  no - Hair Changes  no - Skin Changes  no - Tongue Texture    Food Allergies: no  -   Food Intolerances: no -     Yes - Past medically assisted weight loss history reviewed. Yes - Provided education regarding the basic role of nutrition in junction with Bariatric Surgery. Yes - Provided overview of required dietary and behavioral changes pre and post operatively. Yes - Stated / reinforced that changes in dietary behaviors are critical to successful, long term weight Loss. Patient is aware that in order to proceed with bariatric surgery he / she will need to follow a low-fat diet prior to surgery. Patient is aware that bariatric surgery is a lifetime change that will require the patient to follow a low-fat, low-calorie, low-sugar, portion controlled diet with at least 30 minutes of physical activity daily. Patient is aware that certain foods may not be tolerated after bariatric surgery and certain foods will need avoided all together. Lakeisha / LD feels that this patient knowledge / readiness to make appropriate diet / lifestyle changes assessed to be? - Good    KWESI / LD feels this patients expected adherence for post surgical diet? - Good    Patient was instructed and signed consents on a low-fat diet and required supplementation at initial consult. Comments: Patient is able to verbalize diet concepts for bariatric surgery. Patient is aware diet concepts will be reinforced at 2-hour nutrition education consult. RD / LD will monitor progress.

## 2021-03-19 NOTE — PATIENT INSTRUCTIONS
[facility]  Dietary Initial Appointment Patient Instructions    By: Anna Mar RD / HUGO  [phone]      At your initial dietary appointment you have received the following information packets:    Please be aware at each visit you have been instructed that in order for your insurance company to approve your surgery you must show a consistent weight loss of 2 lbs or greater at each visit. We can not guarantee an approval by your insurance company we can only provide the information given to us it is up to you the patient to show compliancy to your insurance company. If you do gain weight during your supervised weight loss counseling sessions insurance companies starting in 2018 are denying patients for not showing consistent weight loss results when part of a supervised weight loss counseling program. Pt was instructed on 3/19/21. Pt verbalized understanding. · Low-Fat Diet  - Please be sure to begin your low-fat diet from today's date until your scheduled surgery date. If you are not at goal weight by your history and physical appointment with your surgeon your surgery will be cancelled. · Goal Weight: 241.5 lbs BMI: 41.4  · Low-Fat Diet Contract - Patient Acknowledge and Signed  · Supplement List - Please be sure to be saving to purchase your 3 month supply of supplements. If you do not bring supplements to your history and physical appointment your surgery will be cancelled. · Supplement Contract - Patient Acknowledge and Signed  · Please be sure to take a daily Multi-vitamin from now until surgery to reduce your incidence of infection. · If your insurance company requires additional dietary counseling you will be scheduled to see the dietitian the following month. ·  If your psychological evaluation is completed and all your dietary requirements for your individual insurance company have been completed you will be submitted to insurance.  Please make sure to check with us to see if we have received your psychological evaluation. Please be aware that any co-pays or deductibles may be requested prior to testing and / or procedures. You will need to schedule a psychological evaluation for weight loss surgery. Patients will be required to complete all psychological testing as required by the psychologist. Patients must follow all of the psychologist's recommendations before weight loss surgery can be scheduled. The evaluation must be done a standard way for weight loss surgery. We strongly recommend that you contact one of our preferred providers listed below to arrange this:    Ney Florence, [address] [facility]                                                              [address] [place] [address]                                                                                      [phone]     [place] Rao [address] [place] [address]                                                                                                [phone]        Dr. Julia Campbell, PhD                         Hina Milligan. [place], [place]                                                                                              [phone]     You will also need to plan on attending a 2 hour nutrition class at [facility] prior to your surgery. We will schedule this for you when we schedule your surgery. Please remember to have your labs drawn prior to your scheduled appointment to review the results of your testing. Please remember, that while we will submit your case to insurance for surgery authorization, it is your responsibility to know if your plan covers weight loss surgery and keep up-to-date with changes to your insurance coverage.   We will do everything possible to help you get approved for weight loss surgery, but cannot guarantee an approval. Please note that you will not be submitted to your insurance company until all   pre-operative testing requirements are met. · Please remember you need to schedule to attend one Support Group meeting held at the [facility] before surgery. This one meeting is mandatory. You can attend as many support group meetings before and after surgery. Support group meetings are held at the [facility] from 6:00 - 8:00pm 1st, and 3rd Tuesday of every month. See dates listed below. · Each individual person has his / her own medical requirements that need fulfilled before being able to schedule bariatric surgery . Please finalize these requirements by contacting our Bariatric Nurse at [phone].

## 2021-03-24 ENCOUNTER — OFFICE VISIT (OUTPATIENT)
Dept: BARIATRICS/WEIGHT MGMT | Age: 51
End: 2021-03-24
Payer: COMMERCIAL

## 2021-03-24 VITALS
RESPIRATION RATE: 20 BRPM | DIASTOLIC BLOOD PRESSURE: 56 MMHG | HEIGHT: 64 IN | WEIGHT: 252 LBS | HEART RATE: 88 BPM | SYSTOLIC BLOOD PRESSURE: 120 MMHG | BODY MASS INDEX: 43.02 KG/M2 | TEMPERATURE: 97 F

## 2021-03-24 DIAGNOSIS — Z01.818 PRE-OPERATIVE CLEARANCE: Primary | ICD-10-CM

## 2021-03-24 DIAGNOSIS — K44.9 HIATAL HERNIA: ICD-10-CM

## 2021-03-24 PROCEDURE — G8484 FLU IMMUNIZE NO ADMIN: HCPCS | Performed by: SURGERY

## 2021-03-24 PROCEDURE — 4004F PT TOBACCO SCREEN RCVD TLK: CPT | Performed by: SURGERY

## 2021-03-24 PROCEDURE — 99211 OFF/OP EST MAY X REQ PHY/QHP: CPT

## 2021-03-24 PROCEDURE — G8427 DOCREV CUR MEDS BY ELIG CLIN: HCPCS | Performed by: SURGERY

## 2021-03-24 PROCEDURE — 99214 OFFICE O/P EST MOD 30 MIN: CPT | Performed by: SURGERY

## 2021-03-24 PROCEDURE — G8417 CALC BMI ABV UP PARAM F/U: HCPCS | Performed by: SURGERY

## 2021-03-24 NOTE — PROGRESS NOTES
EGD/USGB/Lab results f/u. Pt denied PONV and is schedule April 20th with Dr. Roca Born for her cardiology appt.

## 2021-03-24 NOTE — PATIENT INSTRUCTIONS
What is the next step to proceed with weight loss surgery? Please be aware that any co-pays or deductibles may be requested prior to testing and / or procedures. You will need to schedule a psychological evaluation for weight loss surgery. Patients will be required to complete all psychological testing as required by the mental health provider. Patients must also follow all of the provider's recommendations before weight loss surgery can be scheduled. The evaluation must be done a standard way for weight loss surgery. We strongly recommend that you contact one of our preferred providers listed below to arrange this:      Bryan Joy, [facility]  [address] [place], [place]   [phone]    [facility] and [address]   [phone]    Dr. Svetlana Ferguson, PhD    Jeff Sanders. [place], [place]    [phone]      You will also need to plan on attending a 2 hour nutrition class at the [facility] prior to your surgery. We will schedule this for you when we schedule your surgery. Please remember to have your labs drawn 10 days prior to your first scheduled dietary appointment. Please remember, that while we will submit your case to insurance for surgery authorization, it is your responsibility to know if your plan covers weight loss surgery and keep up-to-date with changes to your insurance coverage. We will do everything possible to help you get approved for weight loss surgery, but cannot guarantee an approval.     Please note that you will not be submitted to your insurance company until all pre-operative testing requirements are met.

## 2021-03-24 NOTE — PROGRESS NOTES
Garrett Kasper  3/24/2021  [address]     Post-EGD Follow-up. Subjective:   Garrett Kasper is a 53 y.o. female post EGD done 2 weeks ago. She did have a hiatal hernia, did not have gastritis. Biopsy did not show Helicobacter pylori. The gallbladder ultrasound showed no stones. Weight: 252 lb (114.3 kg). Physical exam:    BP (!) 120/56 (Site: Left Lower Arm, Position: Sitting, Cuff Size: Large Adult)   Pulse 88   Temp 97 °F (36.1 °C) (Temporal)   Resp 20   Ht 5' 4\" (1.626 m)   Wt 252 lb (114.3 kg)   LMP  (LMP Unknown)   BMI 43.26 kg/m²   General appearance: alert, appears stated age and cooperative  Lungs: clear to auscultation bilaterally  Heart: regular rate and rhythm  Abdomen: soft, non-tender; bowel sounds normal; no masses,  no organomegaly    Assessment:    Doing well two weeks post EGD with hiatal hernia     Plan: will fix hiatal hernia with wt loss surgery. Stay on the high protein, low calorie diet while waiting for insurance approval. Walk as much as possible but keep your legs elevated when sitting. Continue deep breathing exercizes. Aim for 60 gm Protein and 90 oz of liquids per day. Track protein and fluid intake. Make sure the bowel move daily by taking fiber such as Metamucil. We discussed results and surgery for over 15 minutes.       Physician Signature: Electronically signed by Dr. Dell Arana MD   Cc:  Uyen Morales MD

## 2021-04-20 ENCOUNTER — OFFICE VISIT (OUTPATIENT)
Dept: CARDIOLOGY CLINIC | Age: 51
End: 2021-04-20
Payer: COMMERCIAL

## 2021-04-20 VITALS
SYSTOLIC BLOOD PRESSURE: 122 MMHG | DIASTOLIC BLOOD PRESSURE: 70 MMHG | HEIGHT: 64 IN | RESPIRATION RATE: 16 BRPM | HEART RATE: 88 BPM | BODY MASS INDEX: 42.34 KG/M2 | WEIGHT: 248 LBS

## 2021-04-20 DIAGNOSIS — E66.01 MORBID OBESITY (HCC): ICD-10-CM

## 2021-04-20 DIAGNOSIS — Z01.810 PREOPERATIVE CARDIOVASCULAR EXAMINATION: ICD-10-CM

## 2021-04-20 DIAGNOSIS — E11.9 TYPE 2 DIABETES MELLITUS WITHOUT COMPLICATION, WITHOUT LONG-TERM CURRENT USE OF INSULIN (HCC): ICD-10-CM

## 2021-04-20 DIAGNOSIS — I10 ESSENTIAL HYPERTENSION: Primary | ICD-10-CM

## 2021-04-20 DIAGNOSIS — E78.00 PURE HYPERCHOLESTEROLEMIA: ICD-10-CM

## 2021-04-20 PROCEDURE — 3044F HG A1C LEVEL LT 7.0%: CPT | Performed by: INTERNAL MEDICINE

## 2021-04-20 PROCEDURE — 93000 ELECTROCARDIOGRAM COMPLETE: CPT | Performed by: INTERNAL MEDICINE

## 2021-04-20 PROCEDURE — 4004F PT TOBACCO SCREEN RCVD TLK: CPT | Performed by: INTERNAL MEDICINE

## 2021-04-20 PROCEDURE — 99204 OFFICE O/P NEW MOD 45 MIN: CPT | Performed by: INTERNAL MEDICINE

## 2021-04-20 PROCEDURE — 2022F DILAT RTA XM EVC RTNOPTHY: CPT | Performed by: INTERNAL MEDICINE

## 2021-04-20 PROCEDURE — G8427 DOCREV CUR MEDS BY ELIG CLIN: HCPCS | Performed by: INTERNAL MEDICINE

## 2021-04-20 PROCEDURE — G8417 CALC BMI ABV UP PARAM F/U: HCPCS | Performed by: INTERNAL MEDICINE

## 2021-04-20 NOTE — PROGRESS NOTES
OUTPATIENT CARDIOLOGY CONSULT    Name: Leonard Maxwell    Age: 53 y.o. Date of Service: 4/20/2021    Reason for Consultation: Hypertension, morbid obesity, preoperative cardiovascular evaluation    Referring Physician: Eunice Youngblood MD    History of Present Illness: The patient is a 52-year-old white female with no known history of structural heart disease and a risk profile of hypertension, diabetes without present therapy beyond that of dietary modification, hyperlipidemia and prolonged tobacco consumption without present documented chronic obstructive lung disease. She is active with functional capabilities of approximately 5 METs and denies symptoms of anginal-like chest discomfort or other ischemic equivalents, decompensated left ventricular systolic dysfunction or volume overload. She is noted no arrhythmia related symptoms. At the time of her evaluation she remains normotensive. In light of her obesity, present consideration of bariatric surgical intervention has been recommended. Review of Systems: The remainder of a complete multisystem review including consitutional, central nervous, respiratory, circulatory, gastrointestinal, genitourinary, endocrinologic, hematologic, musculoskeletal and psychiatric are negative. Past Medical History:  Past Medical History:   Diagnosis Date    Depression     Hyperlipidemia     Morbid (severe) obesity due to excess calories ([address]        Past Surgical History:  Past Surgical History:   Procedure Laterality Date    CARDIAC CATHETERIZATION      CARPAL TUNNEL RELEASE      TONSILLECTOMY AND ADENOIDECTOMY      UPPER GASTROINTESTINAL ENDOSCOPY N/A 3/12/2021    EGD BIOPSY performed by Malu Worley MD at [address] [place] [address] History:  History reviewed. No pertinent family history.     Social History:  Social History     Socioeconomic History    Marital status:      Spouse name: Not on file    Number of children: Not on file    Years of education: Not on file    Highest education level: Not on file   Occupational History    Not on file   Social Needs    Financial resource strain: Not on file    Food insecurity     Worry: Not on file     Inability: Not on file    Transportation needs     Medical: Not on file     Non-medical: Not on file   Tobacco Use    Smoking status: Current Every Day Smoker     Packs/day: 0.50     Years: 20.00     Pack years: 10.00     Types: Cigarettes     Start date: 4/20/1998    Smokeless tobacco: Never Used   Substance and Sexual Activity    Alcohol use: No    Drug use: No    Sexual activity: Yes     Partners: Male   Lifestyle    Physical activity     Days per week: Not on file     Minutes per session: Not on file    Stress: Not on file   Relationships    Social connections     Talks on phone: Not on file     Gets together: Not on file     Attends Anabaptist service: Not on file     Active member of club or organization: Not on file     Attends meetings of clubs or organizations: Not on file     Relationship status: Not on file    Intimate partner violence     Fear of current or ex partner: Not on file     Emotionally abused: Not on file     Physically abused: Not on file     Forced sexual activity: Not on file   Other Topics Concern    Not on file   Social History Narrative    Not on file       Allergies:  No Known Allergies    Current Medications:  Current Outpatient Medications   Medication Sig Dispense Refill    B Complex-C (SUPER B COMPLEX PO) Take by mouth daily      lisinopril (PRINIVIL;ZESTRIL) 5 MG tablet Take 5 mg by mouth daily      metFORMIN (GLUCOPHAGE) 500 MG tablet Take 500 mg by mouth daily      levothyroxine (SYNTHROID) 25 MCG tablet Take 25 mcg by mouth daily      DULoxetine (CYMBALTA) 30 MG extended release capsule Take 30 mg by mouth every morning (before breakfast)      hydrOXYzine (VISTARIL) 50 MG capsule Take 50 mg by mouth 3 times daily      LATUDA 60 MG TABS tablet Take 60 mg by neurologic deficits are present. Laboratory Tests:  Lab Results   Component Value Date    CREATININE 0.8 03/12/2021    BUN 10 03/12/2021     03/12/2021    K 4.4 03/12/2021     03/12/2021    CO2 28 03/12/2021     No results found for: BNP  Lab Results   Component Value Date    WBC 9.8 03/12/2021    RBC 4.51 03/12/2021    HGB 14.0 03/12/2021    HCT 42.6 03/12/2021    MCV 94.5 03/12/2021    MCH 31.0 03/12/2021    MCHC 32.9 03/12/2021    RDW 13.7 03/12/2021     03/12/2021    MPV 9.0 03/12/2021     No results for input(s): ALKPHOS, ALT, AST, PROT, BILITOT, BILIDIR, LABALBU in the last 72 hours. No results found for: MG  No results found for: PROTIME, INR  Lab Results   Component Value Date    TSH 3.840 03/12/2021     No components found for: CHLPL  Lab Results   Component Value Date    TRIG 162 (H) 03/12/2021    TRIG 155 (H) 10/26/2020     Lab Results   Component Value Date    HDL 41 03/12/2021    HDL 41 10/26/2020     Lab Results   Component Value Date    LDLCALC 115 (H) 03/12/2021    LDLCALC 162 (H) 10/26/2020       Cardiac Tests:  ECG: A resting electrocardiogram demonstrates evidence of sinus rhythm and is otherwise unremarkable      ASSESSMENT / PLAN: On a clinical basis, the patient presents with no active cardiovascular symptoms on this basis would be an acceptable candidate for her proposed bariatric surgical intervention with a low risk of ischemic events. Cautious periprocedural fluid administration will be essential to reducing risk of iatrogenic volume overload and/or perioperative congestive heart failure. I have extensively discussed with her the needs of appropriate lifestyle modification inclusive of smoking cessation to reduce her risk of the development of chronic obstructive lung disease as well as to reduce risk of future atherosclerotic development.   Ongoing attempts of weight reduction independent of her surgical procedure will be necessary to optimize diastolic cardiac

## 2021-05-11 ENCOUNTER — INITIAL CONSULT (OUTPATIENT)
Dept: BARIATRICS/WEIGHT MGMT | Age: 51
End: 2021-05-11
Payer: COMMERCIAL

## 2021-05-11 DIAGNOSIS — F50.9 COMPULSIVE EATING PATTERNS: ICD-10-CM

## 2021-05-11 DIAGNOSIS — Z00.8 ENCOUNTER FOR PSYCHOLOGICAL EVALUATION: Primary | ICD-10-CM

## 2021-05-11 PROCEDURE — 90791 PSYCH DIAGNOSTIC EVALUATION: CPT | Performed by: SOCIAL WORKER

## 2021-05-11 PROCEDURE — 4004F PT TOBACCO SCREEN RCVD TLK: CPT | Performed by: SOCIAL WORKER

## 2021-05-11 NOTE — PROGRESS NOTES
Diagnostic Evaluation without Medical Services. Rea Sanchez is a 49 y.o. ,   female, referred by Primary Care Provider  for evaluation and treatment. Patient identify verified by Name and . Those attending session : patient      Chief Complaint   Patient presents with    Consultation     Evaluation for bariatric surgery         Motivation for surgery: Motivated for surgery by medical problems    Understanding of procedure: The patient has researched the procedure and understands the possibility of potential weight gain. , The patient  yes talked with other people who have undergone the procedure. and The patient  has not attended a gastric bypas surgery group. Reported Current weight 249. Wt Readings from Last 3 Encounters:   21 248 lb (112.5 kg)   21 252 lb (114.3 kg)   21 251 lb (113.9 kg)       Expectations: The patient expects to loose 100 lbs following surgery over 12 months. Goal weight post surgery: 140-150 lbs. Other expectations: Improvement in health    HISTORY OF PRESENT ILLNESS       EAT/WEIGHT HISTORY    How old were you when you first became concerned with your weight? 50  Most successful diet in the past? Weight watchers  Weight lost on the diet listed above? 30 pounds  Patient stated he / she maintained his / her weight for the following time? did not  How much control over your eating do you feel you Have? Some trouble with carbs      Cravings: For what types of food: carbs, bread pasta                  Strategies used to deal with cravings: I usually eat it      Eating habits: skipped breakfast and lunch, eats dinner and then later snacks on cereal or left overs. Cherry pepsi, 5 or 6 cans per day, coffee 2 or 3 cups per day. Emotional eating: Stress  Boredom      BINGE EATING    Recurrent episodes of binge eating: An episode is characterized by:  1.  Eating a larger amount of food than normal during a short period of time (within any two hour period): No  2. Lack of control over eating during the binge episode (i.e. The feeling that one cannot stop eating): No  Both Symptoms Met: No    Binge eating episodes are associated with three or more of the followin. Eating until feeling uncomfortably full: sometimes  2. Eating large amounts of food when not physically hungary: No  3. Eating much more rapidly than normal: No  4. Eating alone because you are embarrassed by how much you are eating; No  5. Feeling disgusted, depressed, or guilty after eating: yes if I over eat  THREE ASSOCIATED SYMPTOMS MET:No    Marked distress regarding binge eating is present: No    Binge eating occurs at least once a week for 3 months: No  The patient reports at least NA binge episodes per week for the past NA months. COMPULSIVE EATING PATTERN    1. Compulsive overeating without thoughts to harmful consequences (weight gain, diabetes, chronic pain, low self esteem); Yes  2. Inability to reduce or change continuous patterns of food intake (snacking/grazing, overeating foods that are high in simple carbs and/or fats); Yes  3. Continued compulsive eating in spite of negative consequences. (Obesity, diabetes complications, others); Yes    The binge eating is not associated with the regular use of inappropriate compensatory behavior (i.e. Purging, excessive exercise etc) and does not occur exclusively during the course of bulimia nervosa or anorexia nervosa: No    PATIENT MEETS ABOVE CRITERIA FOR  EATING DISORDER: Yes    What lifestyle changes started: Cut back to one can of pop, eating breakfast and lunch more consistently.        MEDICAL PROBLEMS    Medical History:  Past Medical History:   Diagnosis Date    Depression     Hyperlipidemia     Morbid (severe) obesity due to excess calories ([facility])         Current Outpatient Medications   Medication Sig Dispense Refill    B Complex-C (SUPER B COMPLEX PO) Take by mouth daily      lisinopril (PRINIVIL;ZESTRIL) 5 MG tablet Take 5 mg by mouth daily      metFORMIN (GLUCOPHAGE) 500 MG tablet Take 500 mg by mouth daily      levothyroxine (SYNTHROID) 25 MCG tablet Take 25 mcg by mouth daily      DULoxetine (CYMBALTA) 30 MG extended release capsule Take 30 mg by mouth every morning (before breakfast)      hydrOXYzine (VISTARIL) 50 MG capsule Take 50 mg by mouth 3 times daily      LATUDA 60 MG TABS tablet Take 60 mg by mouth every evening      omeprazole (PRILOSEC) 40 MG delayed release capsule Take 40 mg by mouth daily      traZODone (DESYREL) 100 MG tablet Take 100 tablets by mouth nightly as needed      Coenzyme Q10 (CO Q 10) 10 MG CAPS Take 1 capsule by mouth daily      magnesium (MAGNESIUM-OXIDE) 250 MG TABS tablet Take 250 mg by mouth daily      Calcium Carb-Cholecalciferol (CALCIUM 600 + D PO) Take 1 tablet by mouth daily      Multiple Vitamins-Minerals (ONE-A-DAY FOR HER VITACRAVES PO) Take 1 tablet by mouth daily      DULoxetine (CYMBALTA) 60 MG extended release capsule Take 60 mg by mouth nightly      pravastatin (PRAVACHOL) 20 MG tablet Take 20 mg by mouth daily      cyclobenzaprine (FLEXERIL) 10 MG tablet Take 10 mg by mouth daily       No current facility-administered medications for this visit. PSYCHIATRIC HISTORY    Past Psychiatric History: MH: Three years ago the PT's son was murdered in a bar and she became depressed and started seeing a therapist and is still being seen by a psychiatrics at [facility]) treated for depression. Family Psychiatric History: Father was an alcoholic, mother had depression. Family History of Obesity? Yes Other family members with weight problems: \"all of us\"      CURRENT/RECENT CHEMICAL USE: On special occasions only PT reported that she will have 1 or 2 mixed drinks, no reported use of recreational drugs. tobacco:  current smoker, plans to begin chantrix next week, prescribed by PCP. caffeine:  3 cups of coffee per day. Self Injurious Behavior: denies    Homicide screen: denies current homicidal ideation, plan and intent    History of Violence: denies    Access to Guns/Weapons: no    CLINICAL ASSESSMENT    Major Psychiatric Contraindications for surgery: The patient acknowledged a history of mental health issues:  PT reported that she is being treated for depression realted to the murder of her some (complicated bereavement) she appears to be stable and stated that medication is affective. The patient appeared to have reasonable expectations regarding surgery. Patient was fairly informed about the surgery and changes needed post surgery. The patient appears to be motivated to make lifestyle changes as evidenced by  meal plan changes. The patient appears to have fair social support as evidenced by family and friends supporting    Spouse's and friends evidence of level of support for surgery: Changed own dietary habilts  agree with decision for surgery     Other social supports: friends    DIAGNOSIS:   Encounter Diagnosis   Name Primary?  Encounter for psychological evaluation Yes        TREATMENT PLAN    Patient Goals: Increased understanding of role of emotional factors contributing to issues with food and obesity and strategies to cope with these. Interventions in session: Explored emotional, behavioral, cognitive and environmental factors contributing to issues with food and obesity, with goal of promoting optimal post bariatric surgery outcome. Discussed the importance of attending support group and reinforced the benefits of attending. Provided pt. with hand out \"Why We Eat\", \"Reality Journal\" and \"Identifying and Handling Cravings\"     Safety Plan: not applicable     Assignments/Recommendations: 1-2 follow-up sessions with SW for further education/evaluation. Complete entries in \"Why We Eat\", \"Reality Journal\" and \"Identifying and Handling Cravings\" to discuss next session.   Attend [facility] support group 5-18-21. Follow-up with: referrals/present providers/all scheduled appointments at [facility]. Handouts provided  In office. Next steps: Schedule follow up with me for  60MIN in 7 weeks    Bariatric Surgery: Based on the information gathered through the interview process - there is no current evidence of mental health or substance abuse issues that would impact on the patient receiving bariatric surgery.     Patient and/or family/guardian verbalizes understanding of and agreement with treatment recommendations and plan: yes    Start time: 1:05 pm         End time: 2:05 pm    Visit Time: [address], [place]

## 2021-05-14 ENCOUNTER — VIRTUAL VISIT (OUTPATIENT)
Dept: BARIATRICS/WEIGHT MGMT | Age: 51
End: 2021-05-14
Payer: COMMERCIAL

## 2021-05-14 DIAGNOSIS — Z00.8 NUTRITIONAL ASSESSMENT: ICD-10-CM

## 2021-05-14 DIAGNOSIS — Z71.3 NUTRITIONAL COUNSELING: Primary | ICD-10-CM

## 2021-05-14 PROCEDURE — 99999 PR OFFICE/OUTPT VISIT,PROCEDURE ONLY: CPT | Performed by: DIETITIAN, REGISTERED

## 2021-05-14 NOTE — PROGRESS NOTES
Weight Loss Assessment: Completed by Nutrition Services RD/LD Certified in Adult Weight Management:  Phone Number:  [phone]  Fax Number:   [phone]    Clare Diallo   5/14/21  Weight Loss Appointment: 2nd Weight Loss Appointment      Wt Readings from Last 3 Encounters:   04/20/21 248 lb (112.5 kg)   03/24/21 252 lb (114.3 kg)   03/19/21 251 lb (113.9 kg)        248.6 lbs  IBW: 152 lbs         % IBW: 164%       % EBWL: 2%           ABW: 176 lbs  % ABW: 141%       BMI: There is no height or weight on file to calculate BMI. Patient's 24 Hour Recall:  Breakfast: Scrambled Egg  Snack: Donna Doones - 100 Calorie Pack  Lunch: Salad  Snack: None  Dinner: Grilled Chicken Thigh, Rice and Asparagus  Snack: Banana  Water Intake: 4 to 5 - 8 oz Glasses  Other Beverages: Coffee  Exercise: ADL's -     Education:   Patient has been educated on the importance of reading food labels for the content of sugar and the content of fat that is in the foods serving size contributing to overall calorie consumption. Patient is aware how to identify hidden sugars and hidden fats found in food and reduce calorie intake of empty calories and high fat foods. Patient can verbalize the importance of label reading. Demonstrate the difference between a healthy food label and unhealthy food label. Real life food replicas demonstrating hidden sugars and hidden fats, and actual food labels were part of the patients education to help understand healthy eating habits and determine healthy food choices. Weight loss goal for next follow-up appointment: 5 lbs    Patient has established the following three goals for the next follow-up appointment. 1. Pt states she wants to work on eating breakfast daily. 2. Pt states she wants to watch calorie intake and fat intake within her diet. Pt states making sure to stay within a calorie restricted diet. 3. Pt states she wants to wants to lessen Carbohydrate intake within her diet.      Patient

## 2021-06-15 ENCOUNTER — INITIAL CONSULT (OUTPATIENT)
Dept: BARIATRICS/WEIGHT MGMT | Age: 51
End: 2021-06-15
Payer: COMMERCIAL

## 2021-06-15 DIAGNOSIS — E66.01 MORBID OBESITY DUE TO EXCESS CALORIES (HCC): ICD-10-CM

## 2021-06-15 DIAGNOSIS — Z71.3 DIETARY COUNSELING: Primary | ICD-10-CM

## 2021-06-15 PROCEDURE — 99999 PR OFFICE/OUTPT VISIT,PROCEDURE ONLY: CPT

## 2021-06-17 VITALS — BODY MASS INDEX: 42.74 KG/M2 | WEIGHT: 249 LBS

## 2021-06-17 NOTE — PROGRESS NOTES
WEIGHT:  249 lbs     Pt was in th office for his/her 3rd weight Loss appointment. Pt is aware he/she must meet his/her pre-op weight loss goal in order to proceed with weight loss surgery. Jeronimo / Bill faxed a copy of what was reviewed with the pt to her PCP. Pt verbalized understanding. Rd// Ld enc the following at today's visit for successful pre/post surgical weight loss. Education:  Pt has been educated on the dangers of dining out when trying to lose weight as part of a medically / surgically supervised weight loss program.  Jeronimo Khan reviewed how restaurants add extra calories, fat and sugars in food preparation to make the food more palatable and enjoyable to the consumer. Jeronimo Khan reviewed strategies and coping skills for preparing meals at home and avoiding dinning out all together. 1. Weigh yourself daily and record it. 2. Keep documented food records daily. 3. Just be more active in day to day routine. 4. Higher protein intake and a higher fiber intake. Not a high protein or a high fiber diet     just a higher intake. 5.Eliminate empty calorie consumption. Jeronimo Khan addressed the following with the pt:  - Jeronimo Khan encouraged pt to comply with nutrition recommendations.  - Jeronimo / Ld encouraged participation in support group meetings.  - Jeronimo Khan encouraged pt to go back to maintenance of food records and weight monitoring   records. - Jeronimo Khan reviewed the importance of adequate sleep and stress management.  - Jeronimo Khan reviewed non-food strategies to cope with emotions and stress.  - Jeronimo Khan encouraged pt to practice the following: Mindful eating: Eating slowly: Focusing     on the eating experience without distraction.  - Jeronimo Khan encouraged pt to pay attention to hunger and fullness cues.   - Jeronimo / Ld encouraged meal planning.  - Jeronimo / Ld  encouraged pt to chose nutrient dense whole foods instead of soft, high     calorie foods.  - Jeronimo / Ld encouraged not drinking large amounts of fluids with or immediately after meals and avoiding high caloric empty beverage consumption. Portion control ,meal planning and avoiding empty calorie consumption was reviewed. Pt was encouraged to practice this daily for weight loss success. Rd / Bill reviewed insurance company weight loss requirement on 6/15/21. Pt verbalized understanding. Please be aware at each visit you have been instructed that in order for your insurance company to approve your surgery you must show a consistent weight loss of 2 lbs or greater at each visit. We can not guarantee an approval by your insurance company we can only provide the information given to us it is up to you the patient to show compliancy to your insurance company. If you do gain weight during your supervised weight loss counseling sessions insurance companies starting in 2018 are denying patients for not showing consistent weight loss results when part of a supervised weight loss counseling program.     Pt spent 120 minutes with the Jeronimo Khan today preparing the patient for pre/post surgical dietary changes.

## 2021-07-13 ENCOUNTER — INITIAL CONSULT (OUTPATIENT)
Dept: BARIATRICS/WEIGHT MGMT | Age: 51
End: 2021-07-13
Payer: COMMERCIAL

## 2021-07-13 VITALS — BODY MASS INDEX: 42.68 KG/M2 | WEIGHT: 250 LBS | HEIGHT: 64 IN

## 2021-07-13 DIAGNOSIS — Z71.3 DIETARY COUNSELING: Primary | ICD-10-CM

## 2021-07-13 DIAGNOSIS — E66.01 MORBID OBESITY DUE TO EXCESS CALORIES (HCC): ICD-10-CM

## 2021-07-13 PROCEDURE — 99999 PR OFFICE/OUTPT VISIT,PROCEDURE ONLY: CPT

## 2021-07-13 NOTE — PROGRESS NOTES
Jeronimo Khan encouraged pt to pay attention to hunger and fullness cues. - Jeronimo / Ld encouraged meal planning.  - Jeronimo / Ld  encouraged pt to chose nutrient dense whole foods instead of soft, high     calorie foods.  - Rd / Ld encouraged not drinking large amounts of fluids with or immediately after      meals and avoiding high caloric empty beverage consumption. Portion control ,meal planning and avoiding empty calorie consumption was reviewed. Pt was encouraged to practice this daily for weight loss success. Jeronimo / Bill reviewed insurance company weight loss requirement on 7/13/21. Pt verbalized understanding. Please be aware at each visit you have been instructed that in order for your insurance company to approve your surgery you must show a consistent weight loss of 2 lbs or greater at each visit. We can not guarantee an approval by your insurance company we can only provide the information given to us it is up to you the patient to show compliancy to your insurance company. If you do gain weight during your supervised weight loss counseling sessions insurance companies starting in 2018 are denying patients for not showing consistent weight loss results when part of a supervised weight loss counseling program.     Pt spent 120 minutes with the Jeronimo Khan today preparing the patient for pre/post surgical dietary changes.

## 2021-07-14 ENCOUNTER — INITIAL CONSULT (OUTPATIENT)
Dept: BARIATRICS/WEIGHT MGMT | Age: 51
End: 2021-07-14
Payer: COMMERCIAL

## 2021-07-14 DIAGNOSIS — F50.9 COMPULSIVE EATING PATTERNS: Primary | ICD-10-CM

## 2021-07-14 PROCEDURE — 90837 PSYTX W PT 60 MINUTES: CPT | Performed by: SOCIAL WORKER

## 2021-07-14 PROCEDURE — 4004F PT TOBACCO SCREEN RCVD TLK: CPT | Performed by: SOCIAL WORKER

## 2021-07-14 NOTE — PROGRESS NOTES
INDIVIDUAL SESSION:  SUMMARY/PSYCH CLEARANCE     Barb Lee is a 49 y.o. ,   female, referred by Primary Care Provider and Self  for evaluation and treatment. Patient identify verified by Name and . Those attending session : patient    DX:   Encounter Diagnosis   Name Primary?  Compulsive eating patterns Yes       Chief Complaint   Patient presents with    Consultation     2nd visit final clearance         Wt Readings from Last 3 Encounters:   21 250 lb (113.4 kg)   21 249 lb (112.9 kg)   21 248 lb (112.5 kg)          Narrative: Barb stated that she does eat out of boredom at times and has had some problems while attending [facility]. She stated that there are times when she has cravings but will find things to do to distract herself from thinking about food. She stated that she has eaten some items recently out of convenience and understands that she will have to plan and prepare foods going forward if she wants to be successful. She stated that dietary appointment and the handouts have helped her to identify the changes she will have to make in order to be successful after surgery. She stated that she does have support from her family and  and while she has some concern about having the surgery she is ready to move forward.         Mental Status Exam: appearance:  appropriately dressed and appropriately groomed, behavior:  normal, attitude:  cooperative, speech:  appropriate, mood:  euthymic, affect:  congruent with mood, thought content:  no evidence of psychosis, thought process:  logical and coherent, orientation:  oriented in all spheres, memory:  recent:  good and remote:  good, insight:  fair , judgment:  fair  and cognitive:  intact    RISK ASSESSMENT    Suicide screen: denies current suicidal ideation, plan and intent    Self Injurious Behavior: denies    Homicide screen: denies current homicidal ideation, plan and intent    TREATMENT PLAN:  Goal: Increase understanding of role of emotional factors contributing to issues with food and obesity and strategies to cope. Interventions in session:  Reviewed previous information provided to the patient and reinforced the need for continued engagement in support group and other resources of the [facility]. Provided Psychoeducational regarding physical, emotional, and behavioral changes that might occur for the patient post WLS. Assignments/Recommendations: Follow-up with SW as needed. Attend [facility] support group. Follow up with present providers/all scheduled appointment at [facility]. Next steps: Follow up with SW post surgery as needed. Bariatric Surgery: Final visit:  The patient has completed a Biopsychosocial assessment and 1 educational sessions to evaluate her appropriateness for bariatric surgery. This patient has been compliant with all scheduled sessions and completed all assignments/recommendations including attendance at least one support group meeting. She does present with mental health issues which appear to be stable and so would not interfere with her/his ability to adapt to the EBC changes that will be necessary for success. The patient appears well motivated to make necessary changes and achieve weight loss goals. Based on the information gathered during assessment and subsequent session it appears that she is a fair candidate for Bariatric surgery.      Patient and/or family/guardian verbalizes understanding of and agreement with treatment recommendations and plan: yes    Start time: 1:06 pm         End time: 2:03 pm     Visit Time: 870 [facility]

## 2021-07-14 NOTE — PATIENT INSTRUCTIONS
Assignments/Recommendations: Follow-up with SW as needed. Attend [facility] support group. Follow up with present providers/all scheduled appointment at [facility].

## 2021-08-02 DIAGNOSIS — Z01.818 PRE-OP EVALUATION: Primary | ICD-10-CM

## 2021-08-02 DIAGNOSIS — Z87.891 HISTORY OF TOBACCO ABUSE: ICD-10-CM

## 2021-08-10 ENCOUNTER — INITIAL CONSULT (OUTPATIENT)
Dept: BARIATRICS/WEIGHT MGMT | Age: 51
End: 2021-08-10
Payer: COMMERCIAL

## 2021-08-10 VITALS — BODY MASS INDEX: 43.19 KG/M2 | WEIGHT: 253 LBS | HEIGHT: 64 IN

## 2021-08-10 DIAGNOSIS — E66.01 MORBID OBESITY DUE TO EXCESS CALORIES (HCC): ICD-10-CM

## 2021-08-10 DIAGNOSIS — Z71.3 DIETARY COUNSELING: Primary | ICD-10-CM

## 2021-08-10 PROCEDURE — 99999 PR OFFICE/OUTPT VISIT,PROCEDURE ONLY: CPT

## 2021-08-10 NOTE — PROGRESS NOTES
WEIGHT:  253 lb (114.8 kg)    Pt was in th office for his/her 5th weight Loss appointment. Pt is aware he/she must meet his/her pre-op weight loss goal in order to proceed with weight loss surgery. Jeronimo / Bill faxed a copy of what was reviewed with the pt to her PCP. Pt verbalized understanding. Jeronimo// Bill enc the following at today's visit for successful post-surgical Weight Maintenance    1. Weigh yourself daily and record it. 2. Keep documented food records daily   3. Just be more active in day to day routine   4. Higher protein intake and a higher fiber intake. Not a high protein or a high fiber diet just a higher intake. 5.Eliminate empty calorie consumption    Jeronimo Khan addressed the following with the pt:  - Jeronimo Khan enc pt to comply with nutrition recommendations  - Jeronimo / Bill enc Participation in support group meetings  - Jeronimo Khan enc pt to go back to maintenance of food records and weight monitoring records   - Jeronimo Khan reviewed the importance of adequate sleep and stress management  - Jeronimo Khan reviewed nonfood strategies to cope with emotions and stress  - Jeronimo Khan encouraged pt to practice the following: Mindful eating: Eating slowly: Focusing   on the eating experience without distraction  - Jeronimo Khan enc. pt to pay attention to hunger and fullness  - Rd / Ld enc meal planning  - Jeronimo Saucedo pt to chose nutrient dense whole foods instead of soft, high calorie foods  - Jeronimo / Bill enc not dr Mohsen Hays large amounts of fluids with or immediately after meals    Portion control ,meal planning and avoiding empty calorie consumption. Jeronimo / Bill reviewed insurance company weight loss requirement on 8/10/21. Pt verbalized understanding. Please be aware at each visit you have been instructed that in order for your insurance company to approve your surgery you must show a consistent weight loss of 2 lbs or greater at each visit.  We can not guarantee an approval by your insurance company we can only provide the information given to us it is up to you the patient to show compliancy to your insurance company. If you do gain weight during your supervised weight loss counseling sessions insurance companies starting in 2018 are denying patients for not showing consistent weight loss results when part of a supervised weight loss counseling program.       The following education was reviewed:  [facility]  Supplement Guidelines for   Bariatric Surgery        Listed Below are the recommended supplements for Randy-en-Y Gastric By-pass Surgery or laparoscopic Sleeve Gastrectomy. Please be aware you must purchase a 3 month supply of these supplements within the next month after attending your nutrition class and bring these to your History and Physical Appointment.      Approved supplements for use after Randy-en-Y Gastric Bypass Surgery    Vitamins - Select One for usage after surgery:  (a) Optisource Chewable Multi-Vitamin (1 tablet 4 times daily)    (b) Bariatric Fusion Chewable Complete Multi-Vitamin (1 tablet 4 times daily)    (c) Celebrate Chewable Multi-Vitamin (1 tablet 2 times daily)  and   Celebrate Iron 30 mg + C (1 tablet daily)    (d) Bariatric Advantage Chewable Multi-Formula (1 tablet 2 times daily)  and    Bariatric Advantage Chewable Iron 29 mg (1 tablet daily)      Calcium - Select One for usage after surgery:  (a) Citracal with Vitamin D  315 mg calcium citrate per tablet, 250 iu Vitamin D per tablet (1 tablet 5 times daily)  Dissolved in Water    (b) Citracal with Vitamin D Petities  200 mg calcium citrate per tablet, 250 iu Vitamin D per tablet (2 tablets 4 times daily)  Dissolved in Water    (c) Bariatric Advantage Calcium Lozenges Chewable (1 tablet 3 times daily)    (d) Bariatric Advantage Calcium Chews (1 tablet 6 times daily)  contains calories    (e) Celebrate Calcium Plus 500 (1 tablet 3 times daily)    (f) Calcet Creamy Bites (1 tablet 3 times daily)  contains calories      Laparoscopic Sleeve Gastectomy patients will be required to take Vitamin B12 500 mcgs 1 tablet daily in addition to the supplements listed above      Suggested Post-Operative Vitamin Supplementation Information:   A high-potency vitamin containing at least 200% of the daily value for at least 2/3 of the nutrients.  Begin with chewable or liquid vitamins for the first three months.  Caution liquid vitamins containing iron can stain teeth.  Avoid time-released supplements.  Avoid gel capsules.  Avoid enteric coating.  Choose a complete formula with at least 18 mg of iron, 661YI of folic acid, and contains selenium and zinc in each serving.  Avoid childrens formulas that are incomplete.  Taking vitamins close to food intake may improve gastrointestinal tolerance.  Do separate your dosage of vitamins when taking throughout the day.  Do not mix multivitamin containing iron with calcium supplements, take at least 2 hours apart. Suggested Post-Operative Calcium Supplementation Information:  Calcium supplements are needed due to the great amount of malabsorption that occurs after surgery which, can lead to developing osteoporosis or osteopenia within the first 2 years post-op. A calcium supplement will help maintain bone strength, help your heart pump correctly and aid in the repair of soft tissue. Calcium supplementation is needed lifelong. It is best to always take one dose right before bedtime because calcium is absorbed better when at rest, and another dose around 10:00am and another does around 3:00pm.     ? If you have a predisposition to kidney stones, please talk with your dietitian. Calcium Citrate should block kidney stone formation so this should not be a problem after surgery. If you do not take the correct form of calcium or take the wrong calcium you are more likely to develop kidney stones.     ? Make sure your calcium supplement contains Vitamin D3 at least 800 -1000 iu daily. Spilt calcium into 500-600 mg doses; be mindful of serving size on supplement label. Space doses evenly throughout the day. ? If you are post menopausal and on hormone replacement therapy, please see your dietitian for calcium recommendations. ? If you currently have osteoporosis or osteopenia and are being treated medically (e.g. Actonel, Fosamax), please see your dietitian for calcium recommendations. ? Do not take any of your Calcium within 2 hours of other medications or iron containing multi-vitamins because it may interfere with absorption of the medication. ? Calcium supplements are not a replacement for calcium found within your food. We also recommend in addition to your calcium supplement at least 3 servings of low-fat dairy total daily to meet your calcium requirements. Combined calcium from food and calcium from calcium supplements intake should be greater than 1700 mg. total daily to prevent bone loss during rapid weight loss phase. ? Stay away from Calcium Carbonate. I can not stress this enough after surgery. It is Calcium Citrate we want all patients taking. Do not let supplement shops talk you into something that we do not recommend. It is important in your surgical outcome and overall medical care. ? We Do Not recommend the new Calcium Citrate Crystals  - These are not absorbed well after surgery because they are mainly Calcium Lactate-Gluconate. They are not Calcium Citrate. The company does market them as Calcium Citrate because Calcium Lactate- Gluconate has the same bioavailability as Calcium Citrate but can be made into a drink form. Store Location:  [employer], [employer], [employer], [employer] or by calling [phone] or on-line at wwwNuovo Biologics. Bariatric Advantage   [phone] or on-line at www.bariatricadvantageSpiracur  Bariatric Fusion   [phone] or on-line at www.bariatricfusion. com  Celebrate    [phone] or on-line at www.[employer]. [employer]                                                            Types of Protein Supplements:    Whey Protein:   Is the highest quality of protein available. It is a rich source of Branched Chain Amino Acids containing the highest known levels of any natural food source. Types of Whey Protein:  Whey Protein Isolate   Most amount of protein per serving and   literally zero carbohydrates, lactose and fat. 90% Protein. Highly recommended. Whey Protein Blends   Blend Whey Protein Concentrate and whey   protein isolate to make a good quality   protein. Do not recommend due to trace     amounts of heavy metals present. Whey Protein Concentrate  cheapest , contain high levels of fat and        lactose ( Not good if you are Lactose        Intolerant) 75% Protein. Do ot recommend      due to trace amounts of heavy metals    present. Casein Protein:  Micellar Casein is a slow digesting and rich protein source that continues to feed your muscles long after whey proteins have dropped off. ( More for Body Builders not a good protein for bariatric patients due to it taking up to seven hours to fully digest)    Egg Protein:  Egg Protein is made from pure egg whites. It is fat-free, fast digesting and rich in Branched Chain Amino Acids and Glutamic Acid. 100% protein. Soy Protein:  Fast digesting form of protein with a solid Branched chain Amino Acid profile. Made from non-animal sources, ideal for Lactose Intolerant and Vegans! Avoid Soy-Protein Isolate Powders. Recommend soy protein without added hormones. Collagen Protein:  Is the component of human connective tissue found in skin, hair and nails and has no Essential Amino Acids to help build protein levels. Avoid. Protein Supplement Drinks Comparisons    We do not endorse these stores or products.  These are only to help you with your lifestyle changes. (All serving sizes are 1 scoop, 1 can or 1 packet)  Product Source Cost  (approx.) Calories Fat  (grams) Protein  (grams) Sugars  (grams)              Sammi Tay  Whey Protein         [employer] $45.00 110 0 25 0   Magdalene Michael  Egg Protein [employer] $25.00 120 0 24 0   Iso Pure Powder [employer] / The  [employer] $ 40.00 100-105 0 25 0   Nectar Protein [employer]  [employer] $ 30.00 80 0 23 0   BeneProtein [address] $ 42.00     case of 6 25 0 6 0   ProCel and UpCal D  Plus Phone order  [phone] $ 50.00  case of 6 28 0 5 0-1     Bariatric Advantage [phone]   $50.00 150 1.5  2.0 27 .5   Bariatric Fusion [phone] $35.00 138 0 27 <1   WheyBolic Extreme 60  [employer] $ 45.00 90 0.5 20 0.5   Jacqualyn Sumerco. com $35.00 138 0 27 <1   When choosing a supplement: Do not consume Ensure, Glycerna, Boost or any other high-calorie nutrition supplement on the market. We recommend that the fat content of your protein drink be less than 2 grams of fat, less than 2 grams of sugar and that it be 200 or less calories per serving. We also recommend that you are able to consume enough of your supplement to get your daily protein intake of 60-80 grams. If you are having difficulty getting your protein or are not able to find a supplement, please call your dietitian at [phone].

## 2021-09-09 ENCOUNTER — INITIAL CONSULT (OUTPATIENT)
Dept: BARIATRICS/WEIGHT MGMT | Age: 51
End: 2021-09-09
Payer: COMMERCIAL

## 2021-09-09 ENCOUNTER — TELEPHONE (OUTPATIENT)
Dept: BARIATRICS/WEIGHT MGMT | Age: 51
End: 2021-09-09

## 2021-09-09 VITALS — BODY MASS INDEX: 43.54 KG/M2 | WEIGHT: 255 LBS | HEIGHT: 64 IN

## 2021-09-09 DIAGNOSIS — Z00.8 NUTRITIONAL ASSESSMENT: Primary | ICD-10-CM

## 2021-09-09 DIAGNOSIS — Z71.3 NUTRITIONAL COUNSELING: Primary | ICD-10-CM

## 2021-09-09 DIAGNOSIS — Z00.8 NUTRITIONAL ASSESSMENT: ICD-10-CM

## 2021-09-09 DIAGNOSIS — Z71.3 NUTRITIONAL COUNSELING: ICD-10-CM

## 2021-09-09 PROCEDURE — 99999 PR OFFICE/OUTPT VISIT,PROCEDURE ONLY: CPT | Performed by: DIETITIAN, REGISTERED

## 2021-09-09 NOTE — PROGRESS NOTES
nutrient dense whole foods instead of soft, high calorie foods  - Rd / Ld enc not drinking large amounts of fluids with or immediately after meals    Portion control, meal planning and avoiding empty calorie consumption. Weight loss goal for next follow-up appointment: 6 lbs    Patient has established the following three goals for the next follow-up appointment. 1. Pt states she wants to consume a healthier diet. Pt is going to meal plan, shop, pack and prepare healthier meals. 2. Pt states she wants to make sure she plan's to have several smaller meals throughout the day to help increase her metabolism. 3. Pt states she wants to work on portion control. Pt is going to start to weigh and measure foods to consume adequate portion sizes. Patient has established the following exercise goal for next follow-up appointment:  ADL's - Walking - 3 Day's A Week - Duration: 45 Minutes    Did the patient keep food records:Yes / no    Pt. is aware if they do not comply with [facility] Guidelines that this can lead to the patient being dismissed from the program.    The registered dietitian spent the following time 60 minutes educating the patient and providing the patient with nutritional handouts to follow. __________________________________________________________________________________  Primary Care Physician Follow-up:  Pt. was seen by Ge Aguila RD/HUGO regarding weight loss education and follow-up on 9/9/21. This was the patients 6th appointment with the registered dietitian. The registered dietitian spent the following amount of time with the patient 60 minutes. Please [place] the following: The Primary Care Physician reviewed the above nutrition assessment and patient education and agrees with current diet plan.     The Primary Care Physician wants the current diet plan changed to the following:_____________________________________________________________________________________________________________________________________________________________________________________________________________. Physician Signature:__________________________ Date:______________  Once signed please fax back to the [facility] Center [phone]. We thank you for allowing us to participate in your patients care.

## 2021-09-09 NOTE — TELEPHONE ENCOUNTER
Last Dietary Appointment Notes: 21    [address]: Dalila Zavaleta    Surgery Requested by Patient: As of 21 - RYGB    Date: 2 Hour Nutrition Class: Once all testing is complete    Rd / Ld reviewed the following with the patient:    Rd / Ld at the [facility] reviewed with the patient that he / she has not completed the following in order to proceed with bariatric surgery:     Initial Appt with Surgeon was 21. Initial Appt is only good until 22. Testing will be required again after this date per your insurance company policy. Please remember just because you finished all of your requirements if you did not finish the requirements in a timely manner they can  and you can be required to complete these requirements over again. Each [employer] has its own set of requirements with its own set of deadlines. Once everything listed below is completed you will need to complete the following to proceed with sx. The [facility] will contact you to complete this process. 1. You will be called in order to select your surgery date for insurance submission. 2. You will be called and scheduled to attend a 3 Hour Nutrition Class on the type of surgery you are having completed. These are always scheduled on  from 10:00 am to 1:00 pm and dates vary depending on the type of surgery you are having completed. You will need to purchase your bariatric supplements at this appointment cost is $240.00 to $290.00. Failure to purchase supplements or attend the class will lead to your surgery being cancelled. 3. You will need final Medical Clearance from your Primary Care Physician. Failure to complete will lead to your surgery being cancelled. 4. You will be scheduled for a H&P appointment with your surgeon . It is at this appointment you will need to make goal weight. Failure to complete will lead to your surgery being cancelled.     5. You will be scheduled for PAT at [address] usually the week before your scheduled surgery. Failure to complete will lead to your surgery being cancelled. Remember after all testing that is required it is your responsibility as a patient to call The [facility] to review that we received any testing results or requirements that you had completed. The [facility] is not responsible for tracking of results and testing. Your phone call will help facilitate if what is required was received and completed.        -Everything is complete    Rd / Ld at the [facility] reviewed that he / she is not at his / her pre-surgery goal weight of 241.5 lbs. Patient currently weighs 255 lbs and must return to the [facility] for a weight check on H&P before the patient can be scheduled for surgery. This has been reviewed with the patient and the patient is in agreement. Rd / Ld reviewed with the patient that he / she must purchase a 3 month supply of supplements before his / her surgery or at the time of his / her H&P appointment and the patient states he / she is going to purchase the 3 month supply of supplements on H&P. Patient is aware that failure to purchase the supplements at this appointment will cancel the patients surgery date. Patient states at this time from the time of his / her initial consult here at the [facility] there has been no changes in his / her medical history. Patient is aware failure to disclose information can lead to his / her surgery being cancelled. Patient received a copy of this at the time of his / her final dietary consult.

## 2021-09-21 ENCOUNTER — TELEPHONE (OUTPATIENT)
Dept: BARIATRICS/WEIGHT MGMT | Age: 51
End: 2021-09-21

## 2021-09-21 NOTE — TELEPHONE ENCOUNTER
Case prepared and submitted online to [facility] with request for LRYGB 11-. Online confirmation received and patient notified of insurance submission. Also discussed need for medical clearance. Request faxed to PCP and patient was instructed to call office and ask if appointment is needed.

## 2021-09-21 NOTE — LETTER
Date: 9-    [employer]:  Attention Pre-Determination    Regarding:  Dennise Cartwright  Member ID:  [id number]    Request:     Authorization for CPT 15959  (Laparoscopic Randy-en-Y)                      Diagnosis Codes:  E66.01; E11.9; I10; E78.5; M19.90    Physician: Carlos Mcdermott M.D.  ([address] [place] [address] [id number])           (NPI [id number])    Facility: [address] [place] [address] [id number] 130.  (NPI [id number])    [facility] 79     Dear Christ Redd:     Pre-determination of insurance coverage, and authorization for hospitalization and surgical treatment are requested on behalf of your annuitant Dennise Cartwright for diagnoses of morbid obesity, diabetes, hypertension, hyperlipidemia and degenerative joint disease. Dennise Cartwright is 5'4, weighs 255 lb., and has a BMI of 43.7. She has been severely obese for a number of years despite many years of dietary efforts. She has lost weight through these efforts, however has been unable to maintain satisfactory weight loss. Dennise Cartwright has been evaluated in our bariatric program and is felt to be an excellent candidate  for surgery. The patient has undergone extensive pre-operative education and understands all the risks, benefits and possible complications of surgery. She has also undergone thorough nutritional evaluation and counseling with our registered dietician. Our program provides long term nutritional counseling with unlimited consults with the dietician. All female patients have been educated on the importance of using reliable birth control and avoiding pregnancy for the first 2 years following bariatric surgery. She has been nicotine tested and is negative. All patients are counseled on the importance of remaining nicotine free after surgery, as well as avoiding drug and alcohol use for life after bariatric surgery.      I am requesting authorization for Laparoscopic Randy-en-Y Gastric Bypass, procedure code 63265, with a hospital stay of 1 day, to be performed for the treatment of the patients severe and life threatening diseases. This procedure will be performed at [address] [place] [address]. I appreciate your consideration in this matter and your timely response. Supporting clinical documents are included with this request.    Electronically signed by Dr. Nuno Huang M.D.   Bariatric Surgery

## 2021-09-21 NOTE — LETTER
Date: 9-       Dear Mino Redd:     As an addendum to my prior authorization request for Ms. Antonia De for bariatric surgery, please note she is not currently pregnant, and all female patients of childbearing age have a pregnancy test done the morning of surgery. Electronically signed by Dr. Monda Cabot, M.D.   Bariatric Surgery

## 2021-09-21 NOTE — LETTER
Medical Clearance / Medical Necessity for Randy-en-Y Gastric Bypass    Name: Flavia Mitchell                                     YOB: 1970    I have been caring for the above patient for _____ years. He/she suffers from the following medical conditions:  __________________________________________________________________________________________________________________________________________________________________________________________________________________    The above medical conditions are either caused by or worsened by the patients morbid obesity. Yes_________ No__________    The above medical conditions are currently stable:      Yes_________ No__________    The following medical conditions are difficult to manage/require multiple medications to achieve control due to the patients weight: ______________________________________________________________________  ______________________________________________________________________                    The above patient has participated in the following medical weight loss efforts without long-term weight reduction:  ____________________________________________________________________________________________________________________________________________    I am in support of my patient undergoing a weight loss surgical procedure with [address] [place] [address] [facility] and the patient understands the risks and benefits of weight loss surgery. The patient has reasonable expectations and I believe the patient will be compliant with all post-surgical requirements. I understand the program is comprehensive with dedicated and specially trained staff.  In the event that my patient is over the age of 61, I would like to state that the patients physiological age and co-morbid conditions result in a positive risk to benefit ratio.        ________  In my medical opinion, there are no medical contraindications to proceed with gastric bypass surgery and the patients benefits of surgery outweigh the risks of surgery.       Physician Name (Please Print): __________________________________    Primary Care Physicians Signature: __________________________________    Date: ______/______/______      ;

## 2021-09-23 ENCOUNTER — TELEPHONE (OUTPATIENT)
Dept: BARIATRICS/WEIGHT MGMT | Age: 51
End: 2021-09-23

## 2021-09-23 NOTE — TELEPHONE ENCOUNTER
Jeronimo Khan called pt and LM to complete Sx Hayley. Pt was NA. Jeronimo Khan LM asking pt to call the [facility] at [phone].

## 2021-09-24 ENCOUNTER — INITIAL CONSULT (OUTPATIENT)
Dept: BARIATRICS/WEIGHT MGMT | Age: 51
End: 2021-09-24
Payer: COMMERCIAL

## 2021-09-24 ENCOUNTER — TELEPHONE (OUTPATIENT)
Dept: BARIATRICS/WEIGHT MGMT | Age: 51
End: 2021-09-24

## 2021-09-24 DIAGNOSIS — Z71.3 NUTRITIONAL COUNSELING: Primary | ICD-10-CM

## 2021-09-24 DIAGNOSIS — Z71.3 NUTRITIONAL COUNSELING: ICD-10-CM

## 2021-09-24 DIAGNOSIS — Z00.8 NUTRITIONAL ASSESSMENT: Primary | ICD-10-CM

## 2021-09-24 DIAGNOSIS — Z00.8 NUTRITIONAL ASSESSMENT: ICD-10-CM

## 2021-09-24 PROCEDURE — 99999 PR OFFICE/OUTPT VISIT,PROCEDURE ONLY: CPT | Performed by: DIETITIAN, REGISTERED

## 2021-09-24 NOTE — TELEPHONE ENCOUNTER
Jeronimo Khan called the pt and scheduled the pt for the following. Pt is aware he/she needs to be down to their pre-op weight loss goal when they come in for their weight check or their sx will be cx. Pt verbalized understanding. Low fat diet reviewed. Pre-op weight loss goal reviewed. Pt scheduled for the following see below. Pt is aware supplements are cash, check, credit or debt card. SX Type: RYGB  SX Date: 11/15/21  H&P Appt: Dr. Sharon Lepe ???  Weight Check Appt: This will occur after the 3 hour class at the [facility]  3 Hour Class: At the [facility] From 10:00 - 1:00 pm on - 10/6/21 RYGB Class  Supplements: Pt needs  2 Hour Pt Education Book: Pt needs  [employer] Change in Pain Medication Education Book: Pt needs    Pt was instructed he / she can call any time with questions. Goal Weight: 241.5 lbs - Pt has copy of pre-op diet. Enc pt to follow pre-op diet to get down to pre-op weight loss goal. Pt verbalized understanding.   Pt is aware sx can be cx by her surgeon at H&P appt if he feels pt has not achieved pre-op weight loss goal.

## 2021-09-24 NOTE — PROGRESS NOTES
Jeronimo Khan called the pt and scheduled the pt for the following. Pt is aware he/she needs to be down to their pre-op weight loss goal when they come in for their weight check or their sx will be cx. Pt verbalized understanding. Low fat diet reviewed. Pre-op weight loss goal reviewed. Pt scheduled for the following see below. Pt is aware supplements are cash, check, credit or debt card. SX Type: RYGB  SX Date: 11/15/21  H&P Appt: Dr. Fredis Cox ???  Weight Check Appt: This will occur after the 3 hour class at the [facility]  3 Hour Class: At the [facility] From 10:00 - 1:00 pm on - 10/6/21 RYGB Class  Supplements: Pt needs  2 Hour Pt Education Book: Pt needs  ASM[employer] Change in Pain Medication Education Book: Pt needs    Pt was instructed he / she can call any time with questions. Goal Weight: 241.5 lbs - Pt has copy of pre-op diet. Enc pt to follow pre-op diet to get down to pre-op weight loss goal. Pt verbalized understanding.   Pt is aware sx can be cx by her surgeon at H&P appt if he feels pt has not achieved pre-op weight loss goal.

## 2021-10-06 ENCOUNTER — TELEPHONE (OUTPATIENT)
Dept: BARIATRICS/WEIGHT MGMT | Age: 51
End: 2021-10-06

## 2021-10-06 ENCOUNTER — INITIAL CONSULT (OUTPATIENT)
Dept: BARIATRICS/WEIGHT MGMT | Age: 51
End: 2021-10-06
Payer: COMMERCIAL

## 2021-10-06 VITALS — BODY MASS INDEX: 43.19 KG/M2 | HEIGHT: 64 IN | WEIGHT: 253 LBS

## 2021-10-06 DIAGNOSIS — Z00.8 NUTRITIONAL ASSESSMENT: ICD-10-CM

## 2021-10-06 DIAGNOSIS — Z71.3 NUTRITIONAL COUNSELING: Primary | ICD-10-CM

## 2021-10-06 PROCEDURE — 99999 PR OFFICE/OUTPT VISIT,PROCEDURE ONLY: CPT | Performed by: DIETITIAN, REGISTERED

## 2021-10-06 NOTE — LETTER
[facility]   [address]  Phone: [phone]  Fax: [phone]    Nesha Teresa RD, HUGO        October 6, 2021    Jeremias Kaynadege  [address] [address]      Dear Bebe Mcfadden:        I personally wanted to thank you for selecting The [address] [place] [address] and [facility] as your center of choice for surgery. I wanted to take the time to let you know it means a lot to me to be able to work with you both before and after surgery and I am so glad that you will become part of our surgical family. It has been a privilege to get to know you at your 3 Hour Nutrition Class and become part of your new journey on life. I look forward to working with you in the future and helping you achieve your new goals. I can't wait to see the growth and transformation that occurs for you after your surgery. If at any time you have any questions you can always contact me [phone].      Respectfully,          Nesha Teresa RDN/ HUGO  Bariatric Dietitian  [address] [place] [address] and [facility]  Certified In Adult Weight Management I and II  Certified In Pediatric and Adolescent Weight Management

## 2021-10-06 NOTE — TELEPHONE ENCOUNTER
Received denial for sx from [employer] due to positive drug screed. I called [facility], spoke to pharmacist and she stated that pt current medications have been known to cause false positives in drug screens. Chart given to OM for follow up.

## 2021-10-06 NOTE — PROGRESS NOTES
600 + D PO), Take 1 tablet by mouth daily, Disp: , Rfl:     Multiple Vitamins-Minerals (ONE-A-DAY FOR HER VITACRAVES PO), Take 1 tablet by mouth daily, Disp: , Rfl:     DULoxetine (CYMBALTA) 60 MG extended release capsule, Take 60 mg by mouth nightly, Disp: , Rfl:     pravastatin (PRAVACHOL) 20 MG tablet, Take 20 mg by mouth daily, Disp: , Rfl:     cyclobenzaprine (FLEXERIL) 10 MG tablet, Take 10 mg by mouth daily, Disp: , Rfl:   _    Please check all that apply:  No - Patient did not lose 10% of excess body weight prior to surgery - Pt placed on VLCD Diet - Handout given 9/9/21 and 10/6/21 for the 2nd time  Pt is aware sx can be cx if pt is not at her goal wt. Yes - Patient  is able to verbalize a Bariatric Full Liquid Diet. Yes - Patient is able to verbalize the usage & importance of Protein Supplements. Yes- Patient purchased 3 month supply of protein vitamins and calcium. YES - Patient is instructed to follow a low-fat diet from now until surgery date. YES - Patient is instructed to take 30 grams of a protein supplement from  now until surgery date in addition to low-fat diet guidelines. YES - Patient is instructed to consume 64 ounces of water daily from now until surgery date.  ________________________________________________________________________  Yes - Patient did not lose 10% of excess body weight prior to surgery   - Patient has to follow the VLCD Diet and return to [facility] for weigh-in on H&P   - At weigh in appointment patient must weigh 241.5 lbs or surgery can be cancelled. ________________________________________________________________________  Yes - Patient did purchase 3 month supply of protein, vitamins and Calcium.     Comments:   [facility] Supplements

## 2021-10-13 ENCOUNTER — TELEPHONE (OUTPATIENT)
Dept: BARIATRICS/WEIGHT MGMT | Age: 51
End: 2021-10-13

## 2021-10-18 ENCOUNTER — TELEPHONE (OUTPATIENT)
Dept: BARIATRICS/WEIGHT MGMT | Age: 51
End: 2021-10-18

## 2021-11-01 ENCOUNTER — TELEPHONE (OUTPATIENT)
Dept: BARIATRICS/WEIGHT MGMT | Age: 51
End: 2021-11-01

## 2021-11-02 ENCOUNTER — PREP FOR PROCEDURE (OUTPATIENT)
Dept: SURGERY | Age: 51
End: 2021-11-02

## 2021-11-02 RX ORDER — SCOLOPAMINE TRANSDERMAL SYSTEM 1 MG/1
1 PATCH, EXTENDED RELEASE TRANSDERMAL
Status: CANCELLED | OUTPATIENT
Start: 2021-11-02 | End: 2021-11-05

## 2021-11-02 RX ORDER — SODIUM CHLORIDE 0.9 % (FLUSH) 0.9 %
5-40 SYRINGE (ML) INJECTION EVERY 12 HOURS SCHEDULED
Status: CANCELLED | OUTPATIENT
Start: 2021-11-02

## 2021-11-02 RX ORDER — ONDANSETRON 2 MG/ML
4 INJECTION INTRAMUSCULAR; INTRAVENOUS ONCE
Status: CANCELLED | OUTPATIENT
Start: 2021-11-02 | End: 2021-11-02

## 2021-11-02 RX ORDER — SODIUM CHLORIDE 9 MG/ML
25 INJECTION, SOLUTION INTRAVENOUS PRN
Status: CANCELLED | OUTPATIENT
Start: 2021-11-02

## 2021-11-02 RX ORDER — SODIUM CHLORIDE 0.9 % (FLUSH) 0.9 %
5-40 SYRINGE (ML) INJECTION PRN
Status: CANCELLED | OUTPATIENT
Start: 2021-11-02

## 2021-11-02 RX ORDER — SODIUM CHLORIDE, SODIUM LACTATE, POTASSIUM CHLORIDE, CALCIUM CHLORIDE 600; 310; 30; 20 MG/100ML; MG/100ML; MG/100ML; MG/100ML
INJECTION, SOLUTION INTRAVENOUS CONTINUOUS
Status: CANCELLED | OUTPATIENT
Start: 2021-11-02

## 2021-11-09 ENCOUNTER — HOSPITAL ENCOUNTER (OUTPATIENT)
Age: 51
Discharge: HOME | End: 2021-11-11
Payer: COMMERCIAL

## 2021-11-09 ENCOUNTER — HOSPITAL ENCOUNTER (OUTPATIENT)
Dept: PREADMISSION TESTING | Age: 51
Discharge: HOME | End: 2021-11-09
Payer: COMMERCIAL

## 2021-11-09 VITALS
SYSTOLIC BLOOD PRESSURE: 128 MMHG | TEMPERATURE: 96.9 F | RESPIRATION RATE: 16 BRPM | HEIGHT: 64 IN | HEART RATE: 81 BPM | BODY MASS INDEX: 43.02 KG/M2 | WEIGHT: 252 LBS | DIASTOLIC BLOOD PRESSURE: 72 MMHG | OXYGEN SATURATION: 95 %

## 2021-11-09 DIAGNOSIS — Z01.818 PRE-OP TESTING: ICD-10-CM

## 2021-11-09 DIAGNOSIS — Z01.818 PRE-OP TESTING: Primary | ICD-10-CM

## 2021-11-09 LAB
ALBUMIN SERPL-MCNC: 4.3 G/DL (ref 3.5–5.2)
ALP BLD-CCNC: 98 U/L (ref 35–104)
ALT SERPL-CCNC: 41 U/L (ref 0–32)
ANION GAP SERPL CALCULATED.3IONS-SCNC: 10 MMOL/L (ref 7–16)
AST SERPL-CCNC: 37 U/L (ref 0–31)
BILIRUB SERPL-MCNC: 0.5 MG/DL (ref 0–1.2)
BUN BLDV-MCNC: 10 MG/DL (ref 6–20)
CALCIUM SERPL-MCNC: 10 MG/DL (ref 8.6–10.2)
CHLORIDE BLD-SCNC: 99 MMOL/L (ref 98–107)
CO2: 29 MMOL/L (ref 22–29)
CREAT SERPL-MCNC: 0.9 MG/DL (ref 0.5–1)
GFR AFRICAN AMERICAN: >60
GFR NON-AFRICAN AMERICAN: >60 ML/MIN/1.73
GLUCOSE BLD-MCNC: 127 MG/DL (ref 74–99)
HCT VFR BLD CALC: 43.6 % (ref 34–48)
HEMOGLOBIN: 15.1 G/DL (ref 11.5–15.5)
MCH RBC QN AUTO: 31.4 PG (ref 26–35)
MCHC RBC AUTO-ENTMCNC: 34.6 % (ref 32–34.5)
MCV RBC AUTO: 90.6 FL (ref 80–99.9)
PDW BLD-RTO: 12.8 FL (ref 11.5–15)
PLATELET # BLD: 278 E9/L (ref 130–450)
PMV BLD AUTO: 9.9 FL (ref 7–12)
POTASSIUM SERPL-SCNC: 4 MMOL/L (ref 3.5–5)
RBC # BLD: 4.81 E12/L (ref 3.5–5.5)
SODIUM BLD-SCNC: 138 MMOL/L (ref 132–146)
TOTAL PROTEIN: 7.9 G/DL (ref 6.4–8.3)
WBC # BLD: 9.2 E9/L (ref 4.5–11.5)

## 2021-11-09 PROCEDURE — 80053 COMPREHEN METABOLIC PANEL: CPT

## 2021-11-09 PROCEDURE — 85027 COMPLETE CBC AUTOMATED: CPT

## 2021-11-09 PROCEDURE — U0005 INFEC AGEN DETEC AMPLI PROBE: HCPCS

## 2021-11-09 PROCEDURE — 36415 COLL VENOUS BLD VENIPUNCTURE: CPT

## 2021-11-09 PROCEDURE — U0003 INFECTIOUS AGENT DETECTION BY NUCLEIC ACID (DNA OR RNA); SEVERE ACUTE RESPIRATORY SYNDROME CORONAVIRUS 2 (SARS-COV-2) (CORONAVIRUS DISEASE [COVID-19]), AMPLIFIED PROBE TECHNIQUE, MAKING USE OF HIGH THROUGHPUT TECHNOLOGIES AS DESCRIBED BY CMS-2020-01-R: HCPCS

## 2021-11-09 NOTE — PROGRESS NOTES
[address] [facility]                                                                                                                    PRE OP INSTRUCTIONS FOR  Barb Villanueva        Date: 11/9/2021    Date of surgery: 11/15   Arrival Time: Hospital will call you between 5pm and 7pm with your final arrival time for surgery  The evening before   1. Do not eat or drink anything after midnight prior to surgery. This includes no water, chewing gum, mints or ice chips. 2. Take the following medications with a small sip of water on the morning of Surgery: Flexeril, vistaril, Cymbalta, levothyroxine, omeprazole     3. Diabetics may take evening dose of insulin but none after midnight. If you feel symptomatic or low blood sugar morning of surgery drink 1-2 ounces of apple juice only. 4. Aspirin, Ibuprofen, Advil, Naproxen, Vitamin E and other Anti-inflammatory products should be stopped  before surgery  as directed by your physician. Take Tylenol only unless instructed otherwise by your surgeon. 5. Check with your Doctor regarding stopping Plavix, Coumadin, Lovenox, Eliquis, Effient, or other blood thinners. 6. Do not smoke,use illicit drugs and do not drink any alcoholic beverages 24 hours prior to surgery. 7. You may brush your teeth the morning of surgery. DO NOT SWALLOW WATER    8. You MUST make arrangements for a responsible adult to take you home after your surgery. You will not be allowed to leave alone or drive yourself home. It is strongly suggested someone stay with you the first 24 hrs. Your surgery will be cancelled if you do not have a ride home. 9. PEDIATRIC PATIENTS ONLY:  A parent/legal guardian must accompany a child scheduled for surgery and plan to stay at the hospital until the child is discharged. Please do not bring other children with you.     10. Please wear simple, loose fitting clothing to the hospital.  Do not bring valuables (money, credit cards, checkbooks, Date of Service:  4/5/2021    PCP: Roxann Jenkins MD     Chief Complaint:   Overactive bladder    History of Present Illness:  The patient is a 69 year old female who was last seen in the office on 02/10/2020.     She has a history of overactive bladder which has been present for several years. Symptoms included urgency, frequency, and nocturia. The symptoms were exacerbated by caffeine. She wears a pad for extra protection and was going through 1 thin pad per day. Patient saw a urologist in [place] in the past who recommended pelvic floor therapy. She tried VESIcare 5 mg daily and only had slight improvement. She had weaker stream on the medication. She also had continued nocturia and frequency. She increased VESIcare to 10 mg daily with improvement to urgency but no improvement to her nocturia. She has no history of infections. No history of kidney stones.     Patient followed up on 02/10/2020 after switching to Oxybutynin XL 10 mg daily. She had no improvement of her urgency, frequency. Still with occasional bouts of urge incontinence.      The patient returns to the office today stating she had Botox injections on her bladder done in October of 2020 did help voiding symptoms for some months but does not want to do it again. She wants to try Interstim. She is currently not taking any bladder medication at this time.       Medications:  Current Outpatient Medications   Medication Sig Dispense Refill   • atorvastatin (LIPITOR) 10 MG tablet Take 1 tablet by mouth daily. 90 tablet 1   • amoxicillin (AMOXIL) 500 MG capsule Take 4 tablets by mouth one hour prior to procedure. 12 capsule 1   • mirabegron ER (Myrbetriq) 25 MG 24 hour tablet Take 1 tablet by mouth daily. 30 tablet 5   • vitamin B-12 (CYANOCOBALAMIN) 1000 MCG tablet Take 1,000 mcg by mouth daily.     • fluticasone-salmeterol (ADVAIR, WIXELA) 250-50 MCG/DOSE inhaler Inhale 1 puff into the lungs two times daily. 180 each 1   • valACYclovir (VALTREX) 500 MG  etc.) Do not wear any makeup (including no eye makeup) or nail polish on your fingers or toes. 11. DO NOT wear any jewelry or piercings on day of surgery. All body piercing jewelry must be removed. 12. Shower the night before surgery with _x__Antibacterial soap     13. TOTAL JOINT REPLACEMENT/HYSTERECTOMY PATIENTS ONLY---Remember to bring Blood Bank bracelet to the hospital on the day of surgery. 14. If you have a Living Will and Durable Power of  for Healthcare, please bring in a copy. 15. If appropriate bring crutches, inspirex, WALKER, CANE etc... 12. Notify your Surgeon if you develop any illness between now and surgery time, cough, cold, fever, sore throat, nausea, vomiting, etc.  Please notify your surgeon if you experience dizziness, shortness of breath or blurred vision between now & the time of your surgery. 17. If you have ___dentures, they will be removed before going to the OR; we will provide you a container. If you wear ___contact lenses or ___glasses, they will be removed; please bring a case for them. 18. To provide excellent care visitors will be limited to 1 in the room at any given time. 19. Please bring picture ID and insurance card. 20. Sleep apnea patients need to bring CPAP AND SETTINGS to hospital on day of surgery. 21. During flu season no children under the age of 15 are permitted in the hospital for the safety of all patients. 22. Other Please check in at the information desk in the main lobby. Please wear a face mask. Please call AMBULATORY CARE if you have any further questions.    [facility]     [address] tablet TAKE 1 TABLET BY MOUTH EVERY DAY 90 tablet 0   • Hyaluronic Acid-Vitamin C (HYALURONIC ACID PO)      • aspirin 81 MG tablet Take 81 mg by mouth daily.     • Multiple Minerals-Vitamins (CITRACAL PLUS PO) Take 1,200 mg by mouth 2 times daily.     • Peak Flow Meter Universal Rang (POCKET PEAK FLOW METER) Device As directed 1 Device 0   • cetirizine (ZYRTEC) 10 MG tablet Take 10 mg by mouth daily.     • Omega-3 Fatty Acids (FISH OIL PO)      • Coenzyme Q10 (COQ-10 PO) Take 1 capsule by mouth daily.      • VITAMIN D, CHOLECALCIFEROL, PO Take 2,000 Int'l Units by mouth daily.      • Ascorbic Acid (VITAMIN C) 500 MG tablet Take 500 mg by mouth daily.     • Multiple Vitamins-Minerals (MULTIVITAMIN & MINERAL PO) Take 1 tablet by mouth daily.      • albuterol (VENTOLIN HFA) 108 (90 BASE) MCG/ACT inhaler Inhale 1-2 puffs into the lungs every 4 hours as needed for Shortness of Breath or Wheezing. 1 Inhaler 1     No current facility-administered medications for this visit.       Allergies:  ALLERGIES:   Allergen Reactions   • Cat Dander SHORTNESS OF BREATH and Runny Nose   • Seasonal RASH       Past Medical History:   Past Medical History:   Diagnosis Date   • Asthma    • Bilateral bunions    • Carotid artery stenosis and occlusion    • Depression    • Hyperlipidemia    • Nasal polyps    • Onychomycosis    • Primary localized osteoarthrosis, shoulder region     Bilateral   • Scar condition and fibrosis of skin     Upper lip   • Sinusitis, chronic        Family History:  Family History   Problem Relation Age of Onset   • Heart disease Mother    • Diabetes Mother          76 y/o   • Heart disease Father          88y /o   • Cancer Maternal Aunt 39        stomach   • Cancer Paternal Grandfather         Lung   • Asthma Daughter    • Diabetes Daughter    • Asthma Son    • Osteoarthritis Sister    • Patient is unaware of any medical problems Brother        Surgical History:  Past Surgical History:   Procedure Laterality  Date   • Abdomen surgery     • Breast biopsy Right 2019    FNA - benign   • Colonoscopy     • Gum surgery      x 2   • Joint replacement Right     shoulder-reverse   • Tubal ligation     :    Social History:  Social History     Socioeconomic History   • Marital status:      Spouse name: Not on file   • Number of children: Not on file   • Years of education: Not on file   • Highest education level: Not on file   Occupational History   • Not on file   Tobacco Use   • Smoking status: Former Smoker     Packs/day: 2.00     Years: 13.00     Pack years: 26.00     Types: Cigarettes     Start date: 1969     Quit date: 1982     Years since quittin.7   • Smokeless tobacco: Never Used   Substance and Sexual Activity   • Alcohol use: Yes     Alcohol/week: 3.0 standard drinks     Types: 3 Glasses of wine per week     Comment: social   • Drug use: No   • Sexual activity: Yes     Partners: Male   Other Topics Concern   • Not on file   Social History Narrative   • Not on file     Social Determinants of Health     Financial Resource Strain: Not on file   Food Insecurity: Not on file   Transportation Needs:    • Lack of Transportation (Medical):    • Lack of Transportation (Non-Medical):    Physical Activity:    • Days of Exercise per Week:    • Minutes of Exercise per Session:    Stress: Not on file   Social Connections:    • Social Determinants: Social Connections:    Intimate Partner Violence: Not At Risk   • Social Determinants: Intimate Partner Violence Past Fear: 1   • Social Determinants: Intimate Partner Violence Current Fear: 1         Physical Exam:  There were no vitals taken for this visit.    Constitutional:Well developed, well nourished, afebrile.    In-Office Testing  No results found for this visit on 21.    Lab Results      Radiology Results      Assessment and Plan:    Urge incontinence, urgency, frequency- no improvement on VESIcare of Oxybutynin XL. Patient got  Botox injection on her bladder in October of 2020, which helped her voiding symptoms for some months. Patient does not wish to continue taking Botox injections and prefers to switch over to Interstim.      We discussed alternatives, risks, and benefits of the procedure. Risks include but are not limited to bleeding, infection, treatment failure, paresthesias. She voices understanding to all of the above and wishes to proceed.      -Follow up on 04/15/2021 for PNE Intersitim trial check at 2:15 PM.    I had a brief counseling session with the patient concerning the diagnosis and treatment options.     I performed a brief review of the patient's medical records.     I reviewed the data with patient, including diagnosis, prognosis, and treatment.  Tests reviewed: PVR and Urinalysis    Tests ordered:  None    On 4/1/2021, IBlake scribed the services personally performed by Brad Downey Jr., MD    The documentation recorded by the scribe accurately and completely reflects the service(s) I personally performed and the decisions made by me.      Brad Downey Jr., MD [facility]  Department of Urology  [facility]  [phone]

## 2021-11-10 ENCOUNTER — OFFICE VISIT (OUTPATIENT)
Dept: BARIATRICS/WEIGHT MGMT | Age: 51
End: 2021-11-10
Payer: COMMERCIAL

## 2021-11-10 VITALS
WEIGHT: 246 LBS | TEMPERATURE: 97.2 F | HEART RATE: 88 BPM | RESPIRATION RATE: 20 BRPM | DIASTOLIC BLOOD PRESSURE: 60 MMHG | BODY MASS INDEX: 42 KG/M2 | HEIGHT: 64 IN | SYSTOLIC BLOOD PRESSURE: 129 MMHG

## 2021-11-10 DIAGNOSIS — K30 INDIGESTION: ICD-10-CM

## 2021-11-10 DIAGNOSIS — K21.9 GASTROESOPHAGEAL REFLUX DISEASE WITHOUT ESOPHAGITIS: ICD-10-CM

## 2021-11-10 DIAGNOSIS — R11.2 PONV (POSTOPERATIVE NAUSEA AND VOMITING): ICD-10-CM

## 2021-11-10 DIAGNOSIS — E66.01 MORBID OBESITY DUE TO EXCESS CALORIES (HCC): ICD-10-CM

## 2021-11-10 DIAGNOSIS — Z98.890 PONV (POSTOPERATIVE NAUSEA AND VOMITING): ICD-10-CM

## 2021-11-10 LAB — SARS-COV-2, PCR: NOT DETECTED

## 2021-11-10 PROCEDURE — G8484 FLU IMMUNIZE NO ADMIN: HCPCS | Performed by: SURGERY

## 2021-11-10 PROCEDURE — G8417 CALC BMI ABV UP PARAM F/U: HCPCS | Performed by: SURGERY

## 2021-11-10 PROCEDURE — G8427 DOCREV CUR MEDS BY ELIG CLIN: HCPCS | Performed by: SURGERY

## 2021-11-10 PROCEDURE — 3017F COLORECTAL CA SCREEN DOC REV: CPT | Performed by: SURGERY

## 2021-11-10 PROCEDURE — 99214 OFFICE O/P EST MOD 30 MIN: CPT | Performed by: SURGERY

## 2021-11-10 PROCEDURE — 1036F TOBACCO NON-USER: CPT | Performed by: SURGERY

## 2021-11-10 PROCEDURE — 99213 OFFICE O/P EST LOW 20 MIN: CPT

## 2021-11-10 RX ORDER — ONDANSETRON 4 MG/1
4 TABLET, ORALLY DISINTEGRATING ORAL EVERY 8 HOURS PRN
Qty: 21 TABLET | Refills: 0 | Status: SHIPPED | OUTPATIENT
Start: 2021-11-10

## 2021-11-10 RX ORDER — OMEPRAZOLE 20 MG/1
20 CAPSULE, DELAYED RELEASE ORAL DAILY
Qty: 30 CAPSULE | Refills: 12 | Status: SHIPPED | OUTPATIENT
Start: 2021-11-10

## 2021-11-10 NOTE — PROGRESS NOTES
Barb San  11/10/2021  [facility]               History and Physical  Gastric Bypass and hiatal hernia repair    CHIEF COMPLAINT: Morbid obesity, Type 2 Diabetes Mellitus, Hypertension, Hyperlipidemia, GERD and Degenerative Joint Disease (DJD)    HISTORY OF PRESENT ILLNESS: Barb San is a morbidly-obese 49 y.o.  female, who weighs 246 lb (111.6 kg). She is 94 pounds over her ideal body weight. The Body mass index is 42.23 kg/m². She has lost 9 pounds over the past several months in preparation for surgery. She has multiple medical problems aggravated by her obesity. She wishes to have a gastric bypass so that she can lose more weight and keep the weight off. I have met with her on 2 different occasions in the [facility], where we discussed the surgery in great detail and went over the risks and benefits. She has watched our informational video so she understands all of the extensive risks involved. She states that she understands all of these risks and wishes to proceed. Past Medical History  Patient Active Problem List   Diagnosis    Gastroesophageal reflux disease without esophagitis    Essential hypertension    Type 2 diabetes mellitus without complication, without long-term current use of insulin ([address]    Pure hypercholesterolemia    Morbid obesity ([address]     Past Surgical History:   Procedure Laterality Date    CARDIAC CATHETERIZATION      CARPAL TUNNEL RELEASE      TONSILLECTOMY AND ADENOIDECTOMY      UPPER GASTROINTESTINAL ENDOSCOPY N/A 3/12/2021    EGD BIOPSY performed by Jessica Lujan MD at [facility] ENDOSCOPY      Allergies: Patient has no known allergies. No family history on file.     Medications:  Current Outpatient Medications   Medication Sig Dispense Refill    omeprazole (PRILOSEC) 20 MG delayed release capsule Take 1 capsule by mouth daily 30 capsule 12    ondansetron (ZOFRAN-ODT) 4 MG disintegrating tablet Place 1 tablet under the tongue every 8 hours as needed for Nausea or Vomiting 21 tablet 0    B Complex-C (SUPER B COMPLEX PO) Take by mouth daily      lisinopril (PRINIVIL;ZESTRIL) 5 MG tablet Take 5 mg by mouth daily      metFORMIN (GLUCOPHAGE) 500 MG tablet Take 500 mg by mouth 2 times daily (with meals)       levothyroxine (SYNTHROID) 25 MCG tablet Take 25 mcg by mouth daily      DULoxetine (CYMBALTA) 30 MG extended release capsule Take 30 mg by mouth every morning (before breakfast)      hydrOXYzine (VISTARIL) 50 MG capsule Take 50 mg by mouth 3 times daily      LATUDA 60 MG TABS tablet Take 60 mg by mouth every evening      omeprazole (PRILOSEC) 40 MG delayed release capsule Take 40 mg by mouth daily      traZODone (DESYREL) 100 MG tablet Take 100 tablets by mouth nightly as needed      Calcium Carb-Cholecalciferol (CALCIUM 600 + D PO) Take 1 tablet by mouth daily      Multiple Vitamins-Minerals (ONE-A-DAY FOR HER VITACRAVES PO) Take 1 tablet by mouth daily      DULoxetine (CYMBALTA) 60 MG extended release capsule Take 60 mg by mouth nightly      pravastatin (PRAVACHOL) 20 MG tablet Take 20 mg by mouth daily      cyclobenzaprine (FLEXERIL) 10 MG tablet Take 10 mg by mouth daily       No current facility-administered medications for this visit.         Social History     Tobacco Use    Smoking status: Former Smoker     Packs/day: 0.50     Years: 20.00     Pack years: 10.00     Types: Cigarettes     Start date: 1998     Quit date: 2021     Years since quittin.2    Smokeless tobacco: Never Used   Substance Use Topics    Alcohol use: No      Review of Systems    Review of Systems - General ROS: negative for - chills, fatigue or malaise  ENT ROS: negative for - hearing change, nasal congestion or nasal discharge  Allergy and Immunology ROS: negative for - hives, itchy/watery eyes or nasal congestion  Hematological and Lymphatic ROS: negative for - blood clots, blood transfusions, bruising or fatigue  Endocrine ROS: negative for - malaise/lethargy, mood swings, palpitations or polydipsia/polyuria  Breast ROS: negative for - new or changing breast lumps or nipple changes  Respiratory ROS: negative for - sputum changes, stridor, tachypnea or wheezing  Cardiovascular ROS: negative for - irregular heartbeat, loss of consciousness, murmur or orthopnea  Gastrointestinal ROS: negative for - constipation, diarrhea, gas/bloating, heartburn or hematemesis  Genito-Urinary ROS: negative for - erectile dysfunction, genital discharge, genital ulcers or hematuria  Musculoskeletal ROS: negative for - gait disturbance, muscle pain or muscular weakness      Physical Exam:   Vitals: /60 (Site: Left Lower Arm, Position: Sitting, Cuff Size: Large Adult)   Pulse 88   Temp 97.2 °F (36.2 °C) (Temporal)   Resp 20   Ht 5' 4\" (1.626 m)   Wt 246 lb (111.6 kg)   BMI 42.23 kg/m²     General appearance: alert, appears stated age and cooperative, does ambulate easily. Head: Normocephalic, without obvious abnormality, atraumatic  Neck: no adenopathy, no carotid bruit, no JVD, supple, symmetrical, trachea midline and thyroid not enlarged,   Lungs: clear to auscultation bilaterally  Heart: regular rate and rhythm  Abdomen: soft, non-tender; bowel sounds normal; no masses,  no organomegaly  Extremities: extremities normal, atraumatic, no cyanosis or edema    Assessment:  Morbid obesity with failure of conservative therapy. Patient has been cleared psychologically and medically. The gall bladder ultrasound was normal. Upper endoscopy showed 4cm hiatal hernia. The patient was informed that risks include, but are not limited to: death, anastomotic leak, obstruction, bleeding, and sepsis. Any of these could require further surgery. Other risks include heart attack, DVT, PE, pneumonia, hernia, wound infection, the need for dilatations of the gastrojejunostomy, and the inability to lose appropriate weight and keep it off.  We may not be able to do a Gastic Bypass, in that case we will do a Sleeve Gastrectomy. We discussed that our goal is to ameliorate the medical problems and not to obtain a specific body mass index. She understands the risks and benefits and wishes to proceed with the procedure. She has signed a consent form. Plan:  ([id number]) Laparoscopic Randy-en-Y Gastric Bypass possible hiatal hernia repair, possible robot assistance. She does not need Lovenox post-op. We discussed the surgery and the hospital stay for over 30 min.   Physician Signature: Electronically signed by Dr. Anabell Bowser MD    Send copy of H&P to PCP, Lalita Caceres MD

## 2021-11-14 ENCOUNTER — ANESTHESIA EVENT (OUTPATIENT)
Dept: OPERATING ROOM | Age: 51
DRG: 403 | End: 2021-11-14
Payer: COMMERCIAL

## 2021-11-15 ENCOUNTER — HOSPITAL ENCOUNTER (INPATIENT)
Age: 51
LOS: 9 days | Discharge: HOME | DRG: 403 | End: 2021-11-24
Attending: SURGERY | Admitting: SURGERY
Payer: COMMERCIAL

## 2021-11-15 ENCOUNTER — ANESTHESIA (OUTPATIENT)
Dept: OPERATING ROOM | Age: 51
DRG: 403 | End: 2021-11-15
Payer: COMMERCIAL

## 2021-11-15 VITALS
RESPIRATION RATE: 12 BRPM | DIASTOLIC BLOOD PRESSURE: 59 MMHG | TEMPERATURE: 96.8 F | OXYGEN SATURATION: 96 % | SYSTOLIC BLOOD PRESSURE: 120 MMHG

## 2021-11-15 DIAGNOSIS — Z01.818 PRE-OP TESTING: Primary | ICD-10-CM

## 2021-11-15 DIAGNOSIS — Z98.84 S/P GASTRIC BYPASS: ICD-10-CM

## 2021-11-15 LAB
HCG(URINE) PREGNANCY TEST: NEGATIVE
METER GLUCOSE: 138 MG/DL (ref 74–99)

## 2021-11-15 PROCEDURE — 6360000002 HC RX W HCPCS: Performed by: ANESTHESIOLOGY

## 2021-11-15 PROCEDURE — 6360000002 HC RX W HCPCS: Performed by: SURGERY

## 2021-11-15 PROCEDURE — 3600000005 HC SURGERY LEVEL 5 BASE: Performed by: SURGERY

## 2021-11-15 PROCEDURE — 49905 OMENTAL FLAP INTRA-ABDOM: CPT | Performed by: SURGERY

## 2021-11-15 PROCEDURE — 6360000002 HC RX W HCPCS

## 2021-11-15 PROCEDURE — 7100000001 HC PACU RECOVERY - ADDTL 15 MIN: Performed by: SURGERY

## 2021-11-15 PROCEDURE — 7100000000 HC PACU RECOVERY - FIRST 15 MIN: Performed by: SURGERY

## 2021-11-15 PROCEDURE — 2580000003 HC RX 258

## 2021-11-15 PROCEDURE — 2709999900 HC NON-CHARGEABLE SUPPLY: Performed by: SURGERY

## 2021-11-15 PROCEDURE — 2500000003 HC RX 250 WO HCPCS

## 2021-11-15 PROCEDURE — 02UA47Z SUPPLEMENT HEART WITH AUTOLOGOUS TISSUE SUBSTITUTE, PERCUTANEOUS ENDOSCOPIC APPROACH: ICD-10-PCS | Performed by: SURGERY

## 2021-11-15 PROCEDURE — 1200000000 HC SEMI PRIVATE

## 2021-11-15 PROCEDURE — 2720000010 HC SURG SUPPLY STERILE: Performed by: SURGERY

## 2021-11-15 PROCEDURE — 15734 MUSCLE-SKIN GRAFT TRUNK: CPT | Performed by: SURGERY

## 2021-11-15 PROCEDURE — 3600000015 HC SURGERY LEVEL 5 ADDTL 15MIN: Performed by: SURGERY

## 2021-11-15 PROCEDURE — 43281 LAP PARAESOPHAG HERN REPAIR: CPT | Performed by: SURGERY

## 2021-11-15 PROCEDURE — 3700000001 HC ADD 15 MINUTES (ANESTHESIA): Performed by: SURGERY

## 2021-11-15 PROCEDURE — 2580000003 HC RX 258: Performed by: SURGERY

## 2021-11-15 PROCEDURE — 6370000000 HC RX 637 (ALT 250 FOR IP): Performed by: SURGERY

## 2021-11-15 PROCEDURE — 0BQT4ZZ REPAIR DIAPHRAGM, PERCUTANEOUS ENDOSCOPIC APPROACH: ICD-10-PCS | Performed by: SURGERY

## 2021-11-15 PROCEDURE — 2500000003 HC RX 250 WO HCPCS: Performed by: SURGERY

## 2021-11-15 PROCEDURE — 43644 LAP GASTRIC BYPASS/ROUX-EN-Y: CPT | Performed by: SURGERY

## 2021-11-15 PROCEDURE — 82962 GLUCOSE BLOOD TEST: CPT

## 2021-11-15 PROCEDURE — 81025 URINE PREGNANCY TEST: CPT

## 2021-11-15 PROCEDURE — 88305 TISSUE EXAM BY PATHOLOGIST: CPT

## 2021-11-15 PROCEDURE — 99024 POSTOP FOLLOW-UP VISIT: CPT | Performed by: SURGERY

## 2021-11-15 PROCEDURE — 3700000000 HC ANESTHESIA ATTENDED CARE: Performed by: SURGERY

## 2021-11-15 PROCEDURE — 0D164ZA BYPASS STOMACH TO JEJUNUM, PERCUTANEOUS ENDOSCOPIC APPROACH: ICD-10-PCS | Performed by: SURGERY

## 2021-11-15 RX ORDER — DEXAMETHASONE SODIUM PHOSPHATE 4 MG/ML
INJECTION, SOLUTION INTRA-ARTICULAR; INTRALESIONAL; INTRAMUSCULAR; INTRAVENOUS; SOFT TISSUE PRN
Status: DISCONTINUED | OUTPATIENT
Start: 2021-11-15 | End: 2021-11-15 | Stop reason: SDUPTHER

## 2021-11-15 RX ORDER — PROMETHAZINE HYDROCHLORIDE 25 MG/ML
INJECTION, SOLUTION INTRAMUSCULAR; INTRAVENOUS
Status: COMPLETED
Start: 2021-11-15 | End: 2021-11-15

## 2021-11-15 RX ORDER — ONDANSETRON 2 MG/ML
4 INJECTION INTRAMUSCULAR; INTRAVENOUS ONCE
Status: COMPLETED | OUTPATIENT
Start: 2021-11-15 | End: 2021-11-15

## 2021-11-15 RX ORDER — DIPHENHYDRAMINE HYDROCHLORIDE 50 MG/ML
INJECTION INTRAMUSCULAR; INTRAVENOUS PRN
Status: DISCONTINUED | OUTPATIENT
Start: 2021-11-15 | End: 2021-11-15 | Stop reason: SDUPTHER

## 2021-11-15 RX ORDER — ONDANSETRON 2 MG/ML
4 INJECTION INTRAMUSCULAR; INTRAVENOUS EVERY 6 HOURS PRN
Status: DISCONTINUED | OUTPATIENT
Start: 2021-11-15 | End: 2021-11-24 | Stop reason: HOSPADM

## 2021-11-15 RX ORDER — ONDANSETRON 2 MG/ML
INJECTION INTRAMUSCULAR; INTRAVENOUS PRN
Status: DISCONTINUED | OUTPATIENT
Start: 2021-11-15 | End: 2021-11-15 | Stop reason: SDUPTHER

## 2021-11-15 RX ORDER — SODIUM CHLORIDE 0.9 % (FLUSH) 0.9 %
5-40 SYRINGE (ML) INJECTION PRN
Status: DISCONTINUED | OUTPATIENT
Start: 2021-11-15 | End: 2021-11-24 | Stop reason: HOSPADM

## 2021-11-15 RX ORDER — LIDOCAINE HYDROCHLORIDE 20 MG/ML
INJECTION, SOLUTION INTRAVENOUS PRN
Status: DISCONTINUED | OUTPATIENT
Start: 2021-11-15 | End: 2021-11-15 | Stop reason: SDUPTHER

## 2021-11-15 RX ORDER — SODIUM CHLORIDE, SODIUM LACTATE, POTASSIUM CHLORIDE, CALCIUM CHLORIDE 600; 310; 30; 20 MG/100ML; MG/100ML; MG/100ML; MG/100ML
INJECTION, SOLUTION INTRAVENOUS CONTINUOUS PRN
Status: DISCONTINUED | OUTPATIENT
Start: 2021-11-15 | End: 2021-11-15 | Stop reason: SDUPTHER

## 2021-11-15 RX ORDER — PROMETHAZINE HYDROCHLORIDE 25 MG/ML
6.25 INJECTION, SOLUTION INTRAMUSCULAR; INTRAVENOUS ONCE
Status: COMPLETED | OUTPATIENT
Start: 2021-11-15 | End: 2021-11-15

## 2021-11-15 RX ORDER — SODIUM CHLORIDE 0.9 % (FLUSH) 0.9 %
5-40 SYRINGE (ML) INJECTION EVERY 12 HOURS SCHEDULED
Status: DISCONTINUED | OUTPATIENT
Start: 2021-11-15 | End: 2021-11-24 | Stop reason: HOSPADM

## 2021-11-15 RX ORDER — MIDAZOLAM HYDROCHLORIDE 1 MG/ML
INJECTION INTRAMUSCULAR; INTRAVENOUS PRN
Status: DISCONTINUED | OUTPATIENT
Start: 2021-11-15 | End: 2021-11-15 | Stop reason: SDUPTHER

## 2021-11-15 RX ORDER — SODIUM CHLORIDE 9 MG/ML
25 INJECTION, SOLUTION INTRAVENOUS PRN
Status: DISCONTINUED | OUTPATIENT
Start: 2021-11-15 | End: 2021-11-24 | Stop reason: HOSPADM

## 2021-11-15 RX ORDER — ONDANSETRON 2 MG/ML
INJECTION INTRAMUSCULAR; INTRAVENOUS
Status: COMPLETED
Start: 2021-11-15 | End: 2021-11-15

## 2021-11-15 RX ORDER — NEOSTIGMINE METHYLSULFATE 1 MG/ML
INJECTION, SOLUTION INTRAVENOUS PRN
Status: DISCONTINUED | OUTPATIENT
Start: 2021-11-15 | End: 2021-11-15 | Stop reason: SDUPTHER

## 2021-11-15 RX ORDER — PROCHLORPERAZINE EDISYLATE 5 MG/ML
10 INJECTION INTRAMUSCULAR; INTRAVENOUS EVERY 6 HOURS PRN
Status: DISCONTINUED | OUTPATIENT
Start: 2021-11-15 | End: 2021-11-22

## 2021-11-15 RX ORDER — SODIUM CHLORIDE 0.9 % (FLUSH) 0.9 %
5-40 SYRINGE (ML) INJECTION PRN
Status: DISCONTINUED | OUTPATIENT
Start: 2021-11-15 | End: 2021-11-15 | Stop reason: HOSPADM

## 2021-11-15 RX ORDER — BUPIVACAINE HYDROCHLORIDE AND EPINEPHRINE 2.5; 5 MG/ML; UG/ML
INJECTION, SOLUTION EPIDURAL; INFILTRATION; INTRACAUDAL; PERINEURAL PRN
Status: DISCONTINUED | OUTPATIENT
Start: 2021-11-15 | End: 2021-11-15 | Stop reason: HOSPADM

## 2021-11-15 RX ORDER — MORPHINE SULFATE 2 MG/ML
2 INJECTION, SOLUTION INTRAMUSCULAR; INTRAVENOUS
Status: DISCONTINUED | OUTPATIENT
Start: 2021-11-15 | End: 2021-11-24 | Stop reason: HOSPADM

## 2021-11-15 RX ORDER — GLYCOPYRROLATE 1 MG/5 ML
SYRINGE (ML) INTRAVENOUS PRN
Status: DISCONTINUED | OUTPATIENT
Start: 2021-11-15 | End: 2021-11-15 | Stop reason: SDUPTHER

## 2021-11-15 RX ORDER — SODIUM CHLORIDE 0.9 % (FLUSH) 0.9 %
5-40 SYRINGE (ML) INJECTION EVERY 12 HOURS SCHEDULED
Status: DISCONTINUED | OUTPATIENT
Start: 2021-11-15 | End: 2021-11-15 | Stop reason: HOSPADM

## 2021-11-15 RX ORDER — SODIUM CHLORIDE, SODIUM LACTATE, POTASSIUM CHLORIDE, CALCIUM CHLORIDE 600; 310; 30; 20 MG/100ML; MG/100ML; MG/100ML; MG/100ML
INJECTION, SOLUTION INTRAVENOUS CONTINUOUS
Status: DISCONTINUED | OUTPATIENT
Start: 2021-11-15 | End: 2021-11-21

## 2021-11-15 RX ORDER — FENTANYL CITRATE 50 UG/ML
INJECTION, SOLUTION INTRAMUSCULAR; INTRAVENOUS PRN
Status: DISCONTINUED | OUTPATIENT
Start: 2021-11-15 | End: 2021-11-15 | Stop reason: SDUPTHER

## 2021-11-15 RX ORDER — SODIUM CHLORIDE 9 MG/ML
25 INJECTION, SOLUTION INTRAVENOUS PRN
Status: DISCONTINUED | OUTPATIENT
Start: 2021-11-15 | End: 2021-11-15 | Stop reason: HOSPADM

## 2021-11-15 RX ORDER — MEPERIDINE HYDROCHLORIDE 25 MG/ML
12.5 INJECTION INTRAMUSCULAR; INTRAVENOUS; SUBCUTANEOUS EVERY 5 MIN PRN
Status: DISCONTINUED | OUTPATIENT
Start: 2021-11-15 | End: 2021-11-15 | Stop reason: HOSPADM

## 2021-11-15 RX ORDER — PROPOFOL 10 MG/ML
INJECTION, EMULSION INTRAVENOUS PRN
Status: DISCONTINUED | OUTPATIENT
Start: 2021-11-15 | End: 2021-11-15 | Stop reason: SDUPTHER

## 2021-11-15 RX ORDER — ROCURONIUM BROMIDE 10 MG/ML
INJECTION, SOLUTION INTRAVENOUS PRN
Status: DISCONTINUED | OUTPATIENT
Start: 2021-11-15 | End: 2021-11-15 | Stop reason: SDUPTHER

## 2021-11-15 RX ORDER — MORPHINE SULFATE 4 MG/ML
4 INJECTION, SOLUTION INTRAMUSCULAR; INTRAVENOUS
Status: DISCONTINUED | OUTPATIENT
Start: 2021-11-15 | End: 2021-11-24 | Stop reason: HOSPADM

## 2021-11-15 RX ORDER — SODIUM CHLORIDE, SODIUM LACTATE, POTASSIUM CHLORIDE, CALCIUM CHLORIDE 600; 310; 30; 20 MG/100ML; MG/100ML; MG/100ML; MG/100ML
INJECTION, SOLUTION INTRAVENOUS CONTINUOUS
Status: DISCONTINUED | OUTPATIENT
Start: 2021-11-15 | End: 2021-11-15

## 2021-11-15 RX ORDER — SCOLOPAMINE TRANSDERMAL SYSTEM 1 MG/1
1 PATCH, EXTENDED RELEASE TRANSDERMAL
Status: DISCONTINUED | OUTPATIENT
Start: 2021-11-15 | End: 2021-11-18

## 2021-11-15 RX ADMIN — FENTANYL CITRATE 50 MCG: 50 INJECTION, SOLUTION INTRAMUSCULAR; INTRAVENOUS at 09:17

## 2021-11-15 RX ADMIN — Medication 0.6 MG: at 10:04

## 2021-11-15 RX ADMIN — SODIUM CHLORIDE, POTASSIUM CHLORIDE, SODIUM LACTATE AND CALCIUM CHLORIDE: 600; 310; 30; 20 INJECTION, SOLUTION INTRAVENOUS at 07:28

## 2021-11-15 RX ADMIN — PHENYLEPHRINE HYDROCHLORIDE 50 MCG: 10 INJECTION INTRAVENOUS at 09:48

## 2021-11-15 RX ADMIN — HYDROMORPHONE HYDROCHLORIDE 0.5 MG: 1 INJECTION, SOLUTION INTRAMUSCULAR; INTRAVENOUS; SUBCUTANEOUS at 11:05

## 2021-11-15 RX ADMIN — MORPHINE SULFATE 4 MG: 4 INJECTION, SOLUTION INTRAMUSCULAR; INTRAVENOUS at 18:08

## 2021-11-15 RX ADMIN — ROCURONIUM BROMIDE 5 MG: 10 INJECTION, SOLUTION INTRAVENOUS at 09:09

## 2021-11-15 RX ADMIN — Medication 2000 MG: at 09:14

## 2021-11-15 RX ADMIN — PROPOFOL 150 MG: 10 INJECTION, EMULSION INTRAVENOUS at 09:09

## 2021-11-15 RX ADMIN — SODIUM CHLORIDE, POTASSIUM CHLORIDE, SODIUM LACTATE AND CALCIUM CHLORIDE: 600; 310; 30; 20 INJECTION, SOLUTION INTRAVENOUS at 08:56

## 2021-11-15 RX ADMIN — MORPHINE SULFATE 4 MG: 4 INJECTION, SOLUTION INTRAMUSCULAR; INTRAVENOUS at 22:37

## 2021-11-15 RX ADMIN — DIPHENHYDRAMINE HYDROCHLORIDE 50 MG: 50 INJECTION, SOLUTION INTRAMUSCULAR; INTRAVENOUS at 09:02

## 2021-11-15 RX ADMIN — ROCURONIUM BROMIDE 45 MG: 10 INJECTION, SOLUTION INTRAVENOUS at 09:10

## 2021-11-15 RX ADMIN — LIDOCAINE HYDROCHLORIDE 40 MG: 20 INJECTION, SOLUTION INTRAVENOUS at 09:09

## 2021-11-15 RX ADMIN — DEXAMETHASONE SODIUM PHOSPHATE 10 MG: 4 INJECTION, SOLUTION INTRAMUSCULAR; INTRAVENOUS at 09:16

## 2021-11-15 RX ADMIN — PROMETHAZINE HYDROCHLORIDE 6.25 MG: 25 INJECTION INTRAMUSCULAR; INTRAVENOUS at 10:50

## 2021-11-15 RX ADMIN — PHENYLEPHRINE HYDROCHLORIDE 200 MCG: 10 INJECTION INTRAVENOUS at 09:26

## 2021-11-15 RX ADMIN — SODIUM CHLORIDE, POTASSIUM CHLORIDE, SODIUM LACTATE AND CALCIUM CHLORIDE: 600; 310; 30; 20 INJECTION, SOLUTION INTRAVENOUS at 12:54

## 2021-11-15 RX ADMIN — MIDAZOLAM 2 MG: 1 INJECTION INTRAMUSCULAR; INTRAVENOUS at 09:02

## 2021-11-15 RX ADMIN — Medication 3 MG: at 10:04

## 2021-11-15 RX ADMIN — PROMETHAZINE HYDROCHLORIDE 6.25 MG: 25 INJECTION, SOLUTION INTRAMUSCULAR; INTRAVENOUS at 10:50

## 2021-11-15 RX ADMIN — Medication 10 ML: at 20:27

## 2021-11-15 RX ADMIN — ONDANSETRON 4 MG: 2 INJECTION INTRAMUSCULAR; INTRAVENOUS at 10:45

## 2021-11-15 RX ADMIN — PROMETHAZINE HYDROCHLORIDE 6.25 MG: 25 INJECTION INTRAMUSCULAR; INTRAVENOUS at 11:01

## 2021-11-15 RX ADMIN — ONDANSETRON 4 MG: 2 INJECTION INTRAMUSCULAR; INTRAVENOUS at 12:54

## 2021-11-15 RX ADMIN — ONDANSETRON 4 MG: 2 INJECTION INTRAMUSCULAR; INTRAVENOUS at 07:27

## 2021-11-15 RX ADMIN — MORPHINE SULFATE 4 MG: 4 INJECTION, SOLUTION INTRAMUSCULAR; INTRAVENOUS at 14:45

## 2021-11-15 RX ADMIN — ONDANSETRON 4 MG: 2 INJECTION INTRAMUSCULAR; INTRAVENOUS at 10:04

## 2021-11-15 RX ADMIN — FENTANYL CITRATE 100 MCG: 50 INJECTION, SOLUTION INTRAMUSCULAR; INTRAVENOUS at 09:09

## 2021-11-15 RX ADMIN — Medication 0.5 MG: at 11:05

## 2021-11-15 ASSESSMENT — PULMONARY FUNCTION TESTS
PIF_VALUE: 5
PIF_VALUE: 26
PIF_VALUE: 27
PIF_VALUE: 31
PIF_VALUE: 28
PIF_VALUE: 27
PIF_VALUE: 31
PIF_VALUE: 1
PIF_VALUE: 26
PIF_VALUE: 28
PIF_VALUE: 31
PIF_VALUE: 3
PIF_VALUE: 25
PIF_VALUE: 25
PIF_VALUE: 32
PIF_VALUE: 31
PIF_VALUE: 29
PIF_VALUE: 31
PIF_VALUE: 8
PIF_VALUE: 16
PIF_VALUE: 30
PIF_VALUE: 27
PIF_VALUE: 31
PIF_VALUE: 31
PIF_VALUE: 28
PIF_VALUE: 32
PIF_VALUE: 27
PIF_VALUE: 31
PIF_VALUE: 29
PIF_VALUE: 32
PIF_VALUE: 6
PIF_VALUE: 6
PIF_VALUE: 28
PIF_VALUE: 27
PIF_VALUE: 6
PIF_VALUE: 32
PIF_VALUE: 19
PIF_VALUE: 31
PIF_VALUE: 20
PIF_VALUE: 28
PIF_VALUE: 28
PIF_VALUE: 32
PIF_VALUE: 1
PIF_VALUE: 35
PIF_VALUE: 1
PIF_VALUE: 28
PIF_VALUE: 25
PIF_VALUE: 28
PIF_VALUE: 1
PIF_VALUE: 2
PIF_VALUE: 28
PIF_VALUE: 30
PIF_VALUE: 22
PIF_VALUE: 29
PIF_VALUE: 29
PIF_VALUE: 24
PIF_VALUE: 6
PIF_VALUE: 19
PIF_VALUE: 20
PIF_VALUE: 28
PIF_VALUE: 17
PIF_VALUE: 28
PIF_VALUE: 28
PIF_VALUE: 3
PIF_VALUE: 4
PIF_VALUE: 36
PIF_VALUE: 21
PIF_VALUE: 27
PIF_VALUE: 27
PIF_VALUE: 28
PIF_VALUE: 3
PIF_VALUE: 28
PIF_VALUE: 27

## 2021-11-15 ASSESSMENT — PAIN DESCRIPTION - DESCRIPTORS
DESCRIPTORS: DISCOMFORT
DESCRIPTORS: SHARP

## 2021-11-15 ASSESSMENT — PAIN DESCRIPTION - PAIN TYPE: TYPE: SURGICAL PAIN

## 2021-11-15 ASSESSMENT — PAIN DESCRIPTION - FREQUENCY: FREQUENCY: CONTINUOUS

## 2021-11-15 ASSESSMENT — PAIN SCALES - GENERAL
PAINLEVEL_OUTOF10: 0
PAINLEVEL_OUTOF10: 2
PAINLEVEL_OUTOF10: 9
PAINLEVEL_OUTOF10: 7
PAINLEVEL_OUTOF10: 8
PAINLEVEL_OUTOF10: 7
PAINLEVEL_OUTOF10: 0
PAINLEVEL_OUTOF10: 8

## 2021-11-15 ASSESSMENT — PAIN - FUNCTIONAL ASSESSMENT
PAIN_FUNCTIONAL_ASSESSMENT: PREVENTS OR INTERFERES SOME ACTIVE ACTIVITIES AND ADLS
PAIN_FUNCTIONAL_ASSESSMENT: 0-10

## 2021-11-15 ASSESSMENT — PAIN DESCRIPTION - LOCATION: LOCATION: ABDOMEN

## 2021-11-15 ASSESSMENT — PAIN DESCRIPTION - PROGRESSION: CLINICAL_PROGRESSION: NOT CHANGED

## 2021-11-15 ASSESSMENT — PAIN DESCRIPTION - ORIENTATION: ORIENTATION: MID

## 2021-11-15 NOTE — ANESTHESIA PRE PROCEDURE
Department of Anesthesiology  Preprocedure Note       Name:  Jonatan Richardson   Age:  49 y.o.  :  1970                                          MRN:  [id number]         Date:  11/15/2021      Surgeon: Nicole Fajardo):  Christy Haines MD    Procedure: Procedure(s):  GASTRIC BYPASS MARI-EN-Y LAPAROSCOPIC    Medications prior to admission:   Prior to Admission medications    Medication Sig Start Date End Date Taking?  Authorizing Provider   omeprazole (PRILOSEC) 20 MG delayed release capsule Take 1 capsule by mouth daily 11/10/21  Yes Christy Haines MD   levothyroxine (SYNTHROID) 25 MCG tablet Take 25 mcg by mouth daily 2/3/21  Yes Historical Provider, MD   DULoxetine (CYMBALTA) 30 MG extended release capsule Take 30 mg by mouth every morning (before breakfast) 2/3/21  Yes Historical Provider, MD   hydrOXYzine (VISTARIL) 50 MG capsule Take 50 mg by mouth 3 times daily 21  Yes Historical Provider, MD   ondansetron (ZOFRAN-ODT) 4 MG disintegrating tablet Place 1 tablet under the tongue every 8 hours as needed for Nausea or Vomiting 11/10/21   Christy Haines MD   B Complex-C (SUPER B COMPLEX PO) Take by mouth daily    Historical Provider, MD   lisinopril (PRINIVIL;ZESTRIL) 5 MG tablet Take 5 mg by mouth daily 2/3/21   Historical Provider, MD   metFORMIN (GLUCOPHAGE) 500 MG tablet Take 500 mg by mouth 2 times daily (with meals)  2/3/21   Historical Provider, MD   LATUDA 60 MG TABS tablet Take 60 mg by mouth every evening 21   Historical Provider, MD   omeprazole (PRILOSEC) 40 MG delayed release capsule Take 40 mg by mouth daily 21   Historical Provider, MD   traZODone (DESYREL) 100 MG tablet Take 100 tablets by mouth nightly as needed 21   Historical Provider, MD   Calcium Carb-Cholecalciferol (CALCIUM 600 + D PO) Take 1 tablet by mouth daily    Historical Provider, MD   Multiple Vitamins-Minerals (ONE-A-DAY FOR HER VITACRAVES PO) Take 1 tablet by mouth daily    Historical Provider, MD   DULoxetine (CYMBALTA) 60 MG extended release capsule Take 60 mg by mouth nightly 1/28/21   Historical Provider, MD   pravastatin (PRAVACHOL) 20 MG tablet Take 20 mg by mouth daily    Historical Provider, MD   cyclobenzaprine (FLEXERIL) 10 MG tablet Take 10 mg by mouth daily    Historical Provider, MD       Current medications:    Current Facility-Administered Medications   Medication Dose Route Frequency Provider Last Rate Last Admin    0.9 % sodium chloride infusion  25 mL IntraVENous PRN Debora Rivera MD        ceFAZolin (ANCEF) 2000 mg in sterile water 20 mL IV syringe  2,000 mg IntraVENous On Call to [address] [place] [address], MD        lactated ringers infusion   IntraVENous Continuous Debora Rivera  mL/hr at 11/15/21 0728 New Bag at 11/15/21 0728    scopolamine (TRANSDERM-SCOP) transdermal patch 1 patch  1 patch TransDERmal Q72H Debora Rivera MD   1 patch at 11/15/21 0727    sodium chloride flush 0.9 % injection 5-40 mL  5-40 mL IntraVENous 2 times per day Debora Rivera MD        sodium chloride flush 0.9 % injection 5-40 mL  5-40 mL IntraVENous PRN Debora Rivera MD           Allergies:  No Known Allergies    Problem List:    Patient Active Problem List   Diagnosis Code    Gastroesophageal reflux disease without esophagitis K21.9    Essential hypertension I10    Type 2 diabetes mellitus without complication, without long-term current use of insulin ([facility] E11.9    Pure hypercholesterolemia E78.00    Morbid obesity ([facility] E66.01       Past Medical History:        Diagnosis Date    Depression     Hyperlipidemia     Hypertension     Morbid (severe) obesity due to excess calories [facility])        Past Surgical History:        Procedure Laterality Date    CARDIAC CATHETERIZATION      CARPAL TUNNEL RELEASE      TONSILLECTOMY AND ADENOIDECTOMY      UPPER GASTROINTESTINAL ENDOSCOPY N/A 3/12/2021    EGD BIOPSY performed by Debora Rivera MD at [facility] History:    Social History Tobacco Use    Smoking status: Former Smoker     Packs/day: 0.50     Years: 20.00     Pack years: 10.00     Types: Cigarettes     Start date: 1998     Quit date: 2021     Years since quittin.2    Smokeless tobacco: Never Used   Substance Use Topics    Alcohol use: No                                Counseling given: Not Answered      Vital Signs (Current):   Vitals:    11/15/21 0715   BP: 129/77   Pulse: 114   Resp: 16   Temp: 96.9 °F (36.1 °C)   TempSrc: Infrared   SpO2: 94%                                              BP Readings from Last 3 Encounters:   11/15/21 129/77   11/10/21 129/60   21 128/72       NPO Status: Time of last liquid consumption:                         Time of last solid consumption:                         Date of last liquid consumption: 21                        Date of last solid food consumption: 21    BMI:   Wt Readings from Last 3 Encounters:   11/10/21 246 lb (111.6 kg)   21 252 lb (114.3 kg)   10/06/21 253 lb (114.8 kg)     There is no height or weight on file to calculate BMI.    CBC:   Lab Results   Component Value Date    WBC 9.2 2021    RBC 4.81 2021    HGB 15.1 2021    HCT 43.6 2021    MCV 90.6 2021    RDW 12.8 2021     2021       CMP:   Lab Results   Component Value Date     2021    K 4.0 2021    CL 99 2021    CO2 29 2021    BUN 10 2021    CREATININE 0.9 2021    GFRAA >60 2021    LABGLOM >60 2021    GLUCOSE 127 2021    PROT 7.9 2021    CALCIUM 10.0 2021    BILITOT 0.5 2021    ALKPHOS 98 2021    AST 37 2021    ALT 41 2021       POC Tests: No results for input(s): POCGLU, POCNA, POCK, POCCL, POCBUN, POCHEMO, POCHCT in the last 72 hours.     Coags: No results found for: PROTIME, INR, APTT    HCG (If Applicable):   Lab Results   Component Value Date    PREGTESTUR NEGATIVE 11/15/2021 ABGs: No results found for: PHART, PO2ART, NWF3WWU, HSV5MIU, BEART, I4ROUAHK     Type & Screen (If Applicable):  No results found for: LABABO, LABRH    Drug/Infectious Status (If Applicable):  No results found for: HIV, HEPCAB    COVID-19 Screening (If Applicable):   Lab Results   Component Value Date    COVID19 Not Detected 11/09/2021           Anesthesia Evaluation  Patient summary reviewed  Airway: Mallampati: III  TM distance: >3 FB   Neck ROM: full  Mouth opening: > = 3 FB Dental:          Pulmonary:Negative Pulmonary ROS breath sounds clear to auscultation                             Cardiovascular:    (+) hypertension:, hyperlipidemia      ECG reviewed  Rhythm: regular  Rate: normal           Beta Blocker:  Not on Beta Blocker      ROS comment: EKG: Sinus Rhythm 88. Neuro/Psych:   (+) psychiatric history:            GI/Hepatic/Renal:   (+) GERD:, morbid obesity (BMI: 42.23)          Endo/Other:        (-) diabetes mellitus               Abdominal:   (+) obese (BMI: 42.23),           Vascular: negative vascular ROS. Other Findings:           Anesthesia Plan      general     ASA 3       Induction: intravenous. BIS  MIPS: Postoperative opioids intended. Anesthetic plan and risks discussed with patient. Plan discussed with CRNA.     Attending anesthesiologist reviewed and agrees with Cindy Solis MD   11/15/2021

## 2021-11-15 NOTE — H&P
History and Physical  Gastric Bypass and hiatal hernia repair     CHIEF COMPLAINT: Morbid obesity, Type 2 Diabetes Mellitus, Hypertension, Hyperlipidemia, GERD and Degenerative Joint Disease (DJD)     HISTORY OF PRESENT ILLNESS: Barb Villanueva is a morbidly-obese 49 y.o.  female, who weighs 246 lb (111.6 kg). She is 94 pounds over her ideal body weight. The Body mass index is 42.23 kg/m². She has lost 9 pounds over the past several months in preparation for surgery. She has multiple medical problems aggravated by her obesity. She wishes to have a gastric bypass so that she can lose more weight and keep the weight off. I have met with her on [facility] different occasions in [facility], where we discussed the surgery in great detail and went over the risks and benefits. She has watched our informational video so she understands all of the extensive risks involved. She states that she understands all of these risks and wishes to proceed.     Past Medical History      Patient Active Problem List   Diagnosis    Gastroesophageal reflux disease without esophagitis    Essential hypertension    Type 2 diabetes mellitus without complication, without long-term current use of insulin ([address]    Pure hypercholesterolemia    Morbid obesity ([address]      Past Surgical History         Past Surgical History:   Procedure Laterality Date    CARDIAC CATHETERIZATION        CARPAL TUNNEL RELEASE        TONSILLECTOMY AND ADENOIDECTOMY        UPPER GASTROINTESTINAL ENDOSCOPY N/A 3/12/2021     EGD BIOPSY performed by Gavin Campoverde MD at [facility] ENDOSCOPY         Allergies: Patient has no known allergies.    Family History   No family history on file.        Medications:  Current Facility-Administered Medications          Current Outpatient Medications   Medication Sig Dispense Refill    omeprazole (PRILOSEC) 20 MG delayed release capsule Take 1 capsule by mouth daily 30 capsule 12    ondansetron (ZOFRAN-ODT) 4 MG disintegrating tablet Place 1 tablet under the tongue every 8 hours as needed for Nausea or Vomiting 21 tablet 0    B Complex-C (SUPER B COMPLEX PO) Take by mouth daily        lisinopril (PRINIVIL;ZESTRIL) 5 MG tablet Take 5 mg by mouth daily        metFORMIN (GLUCOPHAGE) 500 MG tablet Take 500 mg by mouth 2 times daily (with meals)         levothyroxine (SYNTHROID) 25 MCG tablet Take 25 mcg by mouth daily        DULoxetine (CYMBALTA) 30 MG extended release capsule Take 30 mg by mouth every morning (before breakfast)        hydrOXYzine (VISTARIL) 50 MG capsule Take 50 mg by mouth 3 times daily        LATUDA 60 MG TABS tablet Take 60 mg by mouth every evening        omeprazole (PRILOSEC) 40 MG delayed release capsule Take 40 mg by mouth daily        traZODone (DESYREL) 100 MG tablet Take 100 tablets by mouth nightly as needed        Calcium Carb-Cholecalciferol (CALCIUM 600 + D PO) Take 1 tablet by mouth daily        Multiple Vitamins-Minerals (ONE-A-DAY FOR HER VITACRAVES PO) Take 1 tablet by mouth daily        DULoxetine (CYMBALTA) 60 MG extended release capsule Take 60 mg by mouth nightly        pravastatin (PRAVACHOL) 20 MG tablet Take 20 mg by mouth daily        cyclobenzaprine (FLEXERIL) 10 MG tablet Take 10 mg by mouth daily          No current facility-administered medications for this visit.            Social History            Tobacco Use    Smoking status: Former Smoker       Packs/day: 0.50       Years: 20.00       Pack years: 10.00       Types: Cigarettes       Start date: 1998       Quit date: 2021       Years since quittin.2    Smokeless tobacco: Never Used   Substance Use Topics    Alcohol use: No      Review of Systems     Review of Systems - General ROS: negative for - chills, fatigue or malaise  ENT ROS: negative for - hearing change, nasal congestion or nasal discharge  Allergy and Immunology ROS: negative for - hives, itchy/watery eyes or nasal congestion  Hematological and Lymphatic ROS: negative for - blood clots, blood transfusions, bruising or fatigue  Endocrine ROS: negative for - malaise/lethargy, mood swings, palpitations or polydipsia/polyuria  Breast ROS: negative for - new or changing breast lumps or nipple changes  Respiratory ROS: negative for - sputum changes, stridor, tachypnea or wheezing  Cardiovascular ROS: negative for - irregular heartbeat, loss of consciousness, murmur or orthopnea  Gastrointestinal ROS: negative for - constipation, diarrhea, gas/bloating, heartburn or hematemesis  Genito-Urinary ROS: negative for - erectile dysfunction, genital discharge, genital ulcers or hematuria  Musculoskeletal ROS: negative for - gait disturbance, muscle pain or muscular weakness        Physical Exam:   Vitals: /60 (Site: Left Lower Arm, Position: Sitting, Cuff Size: Large Adult)   Pulse 88   Temp 97.2 °F (36.2 °C) (Temporal)   Resp 20   Ht 5' 4\" (1.626 m)   Wt 246 lb (111.6 kg)   BMI 42.23 kg/m²      General appearance: alert, appears stated age and cooperative, does ambulate easily. Head: Normocephalic, without obvious abnormality, atraumatic  Neck: no adenopathy, no carotid bruit, no JVD, supple, symmetrical, trachea midline and thyroid not enlarged,   Lungs: clear to auscultation bilaterally  Heart: regular rate and rhythm  Abdomen: soft, non-tender; bowel sounds normal; no masses,  no organomegaly  Extremities: extremities normal, atraumatic, no cyanosis or edema     Assessment:  Morbid obesity with failure of conservative therapy. Patient has been cleared psychologically and medically. The gall bladder ultrasound was normal. Upper endoscopy showed 4cm hiatal hernia. The patient was informed that risks include, but are not limited to: death, anastomotic leak, obstruction, bleeding, and sepsis. Any of these could require further surgery.  Other risks include heart attack, DVT, PE, pneumonia, hernia, wound infection, the need for dilatations of the gastrojejunostomy, and the inability to lose appropriate weight and keep it off. We may not be able to do a Gastic Bypass, in that case we will do a Sleeve Gastrectomy. We discussed that our goal is to ameliorate the medical problems and not to obtain a specific body mass index. She understands the risks and benefits and wishes to proceed with the procedure. She has signed a consent form.         Plan:  ([id number]) Laparoscopic Randy-en-Y Gastric Bypass possible hiatal hernia repair, possible robot assistance. She does not need Lovenox post-op. We discussed the surgery and the hospital stay for over 30 min.   Physician Signature: Electronically signed by Dr. Cassius Whatley MD

## 2021-11-15 NOTE — OP NOTE
[address]    Operative Report  DATE OF PROCEDURE: 11/15/2021  SURGEON: Dr. Skip López MD, M.D.   Tj Flurry: none  PREOPERATIVE DIAGNOSES:   Patient Active Problem List   Diagnosis    Gastroesophageal reflux disease without esophagitis    Essential hypertension    Type 2 diabetes mellitus without complication, without long-term current use of insulin ([address]    Pure hypercholesterolemia    Morbid obesity ([address]    S/P gastric bypass       Morbid obesity , large symptomatic hiatal hernia , GERD   POSTOPERATIVE DIAGNOSES:   Same    OPERATION:   1) Laparoscopic Randy-en-Y gastric bypass. 2) large symptomatic Hiatal hernia repair  3) mobilization and placement of myocutaneous flap  4) mobilization and placement of omental flap           ANESTHESIA: General endotracheal.   ESTIMATED BLOOD LOSS: 20 mL. COMPLICATIONS: None. HISTORY: Tay Briggs is a morbidly-obese 49 y.o.  female, who weighs 246 pounds. The BMI was 42  She has multiple medical problems aggravated by her obesity and a hiatal hernia causing reflux. She wishes to have a gastric bypass so that she can lose more weight and keep the weight off and have the hiatal hernia repaired to help decrease the reflux problems. She understands the extensive risks involved in the surgery and wishes to proceed. PROCEDURE: The patient was placed on the table in the supine position and placed under general endotracheal anesthesia. She had SCDs on the legs as DVT prophylaxis. She had Ancef 2 g IV. Orogastric tube placed in the stomach, then removed, Bustamante placed and left in the bladder. The abdomen was shaved, then prepped with DuraPrep and draped in the usual sterile fashion. Then, 0.25% Marcaine plus epinephrine was injected into the skin and muscle of each incision to help with postoperative pain control. A 5-mm incision was made above and left of the umbilicus. Varies needle used to insufflate with CO2.  A 5 mm esophagus. The repair looked good, not too tight. The Falciform ligament was taken down and rotated behind the esophagus as a myofascial flap and sutured in place to the left parisa and attached to the omentum above the spleen. Pars flaccida clear area was opened with harmonic scalpel. The lesser omental vessels were taken down to the lesser curvature of the stomach. The Ethicon VIDAL 60 blue cartridge was fired horizontally across the stomach to start the pouch. Three more firings vertically out through an opening at the angle of HIS. Hook cautery was used to make a small hole in the posterior wall at the distal gastric staple line. The ligament of Treitz was identified and measured 100 cm and brought up to the stomach pouch. The jejunum was sewn to the stomach with a V lock absorbable suture then a 36 F bougie was placed by Anaesthesia through the anastomosis after the stomach and jejunum were opened with cautery. The anastamosis was then completed with a second absorbable v lock suture. Harmonic was used to make a small opening in the mesentery of the biliary limb and it was then divided with the VIDAL white cartridge lateral to the gastric anastomosis. Pouch size approximately 30 mL. The Bougie was removed. The small bowel was then run 150 cm from the anastomosis and a loop of jejunum was brought up for the distal anastomosis using the triple-staple technique. Hook cautery was used to make a small opening in each limb. The VIDAL 60 white cartridge was fired, inside the lumen in opposite directions. The enterotomy was closed transversely with a VIDAL 60 white cartridge, finishing the distal anastomosis. The mesenteric defect was closed with a running absorbable v lock to prevent internal hernias. The abdomen was filled with saline. A bowel clamp was placed on the jejunum distal to the gastrojejunal anastomosis. An Endoscope was passed down through the mouth.  The scope fit past the hiatal hernia repair

## 2021-11-16 LAB
HCT VFR BLD CALC: 40.6 % (ref 34–48)
HEMOGLOBIN: 13.8 G/DL (ref 11.5–15.5)
MCH RBC QN AUTO: 31.9 PG (ref 26–35)
MCHC RBC AUTO-ENTMCNC: 34 % (ref 32–34.5)
MCV RBC AUTO: 94 FL (ref 80–99.9)
PDW BLD-RTO: 13.4 FL (ref 11.5–15)
PLATELET # BLD: 213 E9/L (ref 130–450)
PMV BLD AUTO: 9.8 FL (ref 7–12)
RBC # BLD: 4.32 E12/L (ref 3.5–5.5)
WBC # BLD: 13.3 E9/L (ref 4.5–11.5)

## 2021-11-16 PROCEDURE — 36415 COLL VENOUS BLD VENIPUNCTURE: CPT

## 2021-11-16 PROCEDURE — 85027 COMPLETE CBC AUTOMATED: CPT

## 2021-11-16 PROCEDURE — 6360000002 HC RX W HCPCS: Performed by: SURGERY

## 2021-11-16 PROCEDURE — 1200000000 HC SEMI PRIVATE

## 2021-11-16 PROCEDURE — 2700000000 HC OXYGEN THERAPY PER DAY

## 2021-11-16 PROCEDURE — 2580000003 HC RX 258: Performed by: SURGERY

## 2021-11-16 PROCEDURE — 6370000000 HC RX 637 (ALT 250 FOR IP): Performed by: INTERNAL MEDICINE

## 2021-11-16 PROCEDURE — 99024 POSTOP FOLLOW-UP VISIT: CPT | Performed by: SURGERY

## 2021-11-16 RX ORDER — LEVOTHYROXINE SODIUM 0.03 MG/1
25 TABLET ORAL DAILY
Status: DISCONTINUED | OUTPATIENT
Start: 2021-11-16 | End: 2021-11-24 | Stop reason: HOSPADM

## 2021-11-16 RX ORDER — LISINOPRIL 5 MG/1
5 TABLET ORAL DAILY
Status: DISCONTINUED | OUTPATIENT
Start: 2021-11-16 | End: 2021-11-24 | Stop reason: HOSPADM

## 2021-11-16 RX ADMIN — MORPHINE SULFATE 4 MG: 4 INJECTION, SOLUTION INTRAMUSCULAR; INTRAVENOUS at 23:21

## 2021-11-16 RX ADMIN — SODIUM CHLORIDE, POTASSIUM CHLORIDE, SODIUM LACTATE AND CALCIUM CHLORIDE: 600; 310; 30; 20 INJECTION, SOLUTION INTRAVENOUS at 13:49

## 2021-11-16 RX ADMIN — LISINOPRIL 5 MG: 5 TABLET ORAL at 08:24

## 2021-11-16 RX ADMIN — MORPHINE SULFATE 4 MG: 4 INJECTION, SOLUTION INTRAMUSCULAR; INTRAVENOUS at 06:08

## 2021-11-16 RX ADMIN — MORPHINE SULFATE 4 MG: 4 INJECTION, SOLUTION INTRAMUSCULAR; INTRAVENOUS at 14:50

## 2021-11-16 RX ADMIN — MORPHINE SULFATE 4 MG: 4 INJECTION, SOLUTION INTRAMUSCULAR; INTRAVENOUS at 03:34

## 2021-11-16 RX ADMIN — LEVOTHYROXINE SODIUM 25 MCG: 25 TABLET ORAL at 08:24

## 2021-11-16 RX ADMIN — MORPHINE SULFATE 4 MG: 4 INJECTION, SOLUTION INTRAMUSCULAR; INTRAVENOUS at 01:33

## 2021-11-16 RX ADMIN — MORPHINE SULFATE 4 MG: 4 INJECTION, SOLUTION INTRAMUSCULAR; INTRAVENOUS at 17:08

## 2021-11-16 RX ADMIN — MORPHINE SULFATE 4 MG: 4 INJECTION, SOLUTION INTRAMUSCULAR; INTRAVENOUS at 11:04

## 2021-11-16 RX ADMIN — SODIUM CHLORIDE, POTASSIUM CHLORIDE, SODIUM LACTATE AND CALCIUM CHLORIDE: 600; 310; 30; 20 INJECTION, SOLUTION INTRAVENOUS at 05:05

## 2021-11-16 RX ADMIN — MORPHINE SULFATE 4 MG: 4 INJECTION, SOLUTION INTRAMUSCULAR; INTRAVENOUS at 08:24

## 2021-11-16 RX ADMIN — MORPHINE SULFATE 4 MG: 4 INJECTION, SOLUTION INTRAMUSCULAR; INTRAVENOUS at 20:40

## 2021-11-16 ASSESSMENT — PAIN SCALES - GENERAL
PAINLEVEL_OUTOF10: 8
PAINLEVEL_OUTOF10: 9
PAINLEVEL_OUTOF10: 0
PAINLEVEL_OUTOF10: 2
PAINLEVEL_OUTOF10: 9
PAINLEVEL_OUTOF10: 2
PAINLEVEL_OUTOF10: 4
PAINLEVEL_OUTOF10: 9
PAINLEVEL_OUTOF10: 8

## 2021-11-16 ASSESSMENT — PAIN DESCRIPTION - PAIN TYPE: TYPE: SURGICAL PAIN

## 2021-11-16 ASSESSMENT — PAIN DESCRIPTION - ORIENTATION: ORIENTATION: OTHER (COMMENT)

## 2021-11-16 ASSESSMENT — PAIN DESCRIPTION - DESCRIPTORS: DESCRIPTORS: ACHING;DISCOMFORT;CONSTANT

## 2021-11-16 ASSESSMENT — PAIN DESCRIPTION - FREQUENCY: FREQUENCY: CONTINUOUS

## 2021-11-16 ASSESSMENT — PAIN DESCRIPTION - ONSET: ONSET: ON-GOING

## 2021-11-16 ASSESSMENT — PAIN DESCRIPTION - PROGRESSION: CLINICAL_PROGRESSION: NOT CHANGED

## 2021-11-16 ASSESSMENT — PAIN DESCRIPTION - LOCATION: LOCATION: ABDOMEN

## 2021-11-16 NOTE — PLAN OF CARE
Problem: Falls - Risk of:  Goal: Will remain free from falls  Description: Will remain free from falls  11/15/2021 2246 by Cassidy Hollis RN  Outcome: Met This Shift  11/15/2021 1247 by Guzman Frank RN  Outcome: Met This Shift  Goal: Absence of physical injury  Description: Absence of physical injury  11/15/2021 2246 by Cassidy Hollis RN  Outcome: Met This Shift  11/15/2021 1247 by Guzman Frank RN  Outcome: Met This Shift     Problem: Pain:  Goal: Pain level will decrease  Description: Pain level will decrease  Outcome: Met This Shift  Goal: Control of acute pain  Description: Control of acute pain  Outcome: Met This Shift  Goal: Control of chronic pain  Description: Control of chronic pain  Outcome: Met This Shift

## 2021-11-16 NOTE — CONSULTS
[address]                                  CONSULTATION    PATIENT NAME: CHUCHO Kay                        :        1970  MED REC NO:   [id number]                            ROOM:       [id number]  ACCOUNT NO:   [de-identified]                           ADMIT DATE: 11/15/2021  PROVIDER:     Mirian Ignacio MD      CONSULT DATE:  2021    REASON FOR MEDICAL CONSULTATION:  Postop medical management in a patient  who underwent gastric bypass surgery as well as hypertension and other  medical problems. HISTORY OF PRESENTING ILLNESS:  This is a 79-year-old white female with  past medical history significant for hypertension, severe obesity with a  BMI closer to 43, failed conservative therapy as an outpatient, GERD,  chronic low back pain, lumbar degenerative disk disease,  hyperinsulinemia, hyperlipidemia and MDD, who was an excellent candidate  to pursue gastric bypass surgery after she failed again conservative  approach, she was cleared medically for her surgery which was done  yesterday, Randy-en-Y gastric bypass, she recovered postop nicely. I was  asked to see her to participate in her postop medical management. The  patient denies any chest pain or shortness of breath, does admit pain at  the surgery site which is managed by the surgical team.  Her blood  pressure is on the higher side, but that is expected due to her pain,  she is on IV fluids currently and oxygen 2 liters with a pulse oximetry  93%. Meds reviewed. PHYSICAL EXAMINATION:  GENERAL:  The patient was sitting in chair. She is ambulating. VITAL SIGNS:  Afebrile. Temperature 99.0, respirations 16, pulse 107,  blood pressure 165/80; again, pulse oximetry 93% on 2 liters. SKIN:  Normal.  HEENT:  Within normal limits. NECK:  No JVD. HEART:  No rubs, murmurs or gallops noted. LUNGS:  Clear. No active wheezing.   ABDOMEN:  Postop surgical baldemar. EXTREMITIES:  No edema, no cyanosis, SCDs are on. Distal pulses  palpable. NEUROLOGIC:  Intact. Follows command appropriately. Normal speech and  normal motor strength. LABORATORY DATA:  Her white count 13,300, H and H 13.8 and 40.6,  platelet count 854,316. ASSESSMENT:  Postop medical management in a patient who has  hypertension, hyperlipidemia, GERD, hiatal hernia, chronic low back  pain, lumbar degenerative disk disease, MDD, hyperinsulinemia, and  impaired fasting glucose; keep in mind she is not diabetic, she is on  metformin for her hyperinsulinemia, recovering very well from her  Randy-en-Y gastric bypass, and large hiatal hernia repair, doing fine  postop. PLAN:  I will renew the lisinopril 5 mg daily and levothyroxine for her  hypothyroidism, I would advise to be cautious with the Ringer lactate  high fluid rate since she has hypertension to prevent more fluid  retention or exacerbation of her hypertension or any form of congestive  heart failure. The patient was encouraged to ambulate, use the InspireX  machine. From my standpoint, she can be discharged at any time when  okay with the surgical team.    Thank you for taking care of my patient and for the consult. Call for  any medical problem.           Vivien Matos MD    D: 11/16/2021 7:51:30       T: 11/16/2021 7:54:26     [email or web address]  Job#: [id number]     Doc#: [id number]    CC:

## 2021-11-16 NOTE — CARE COORDINATION
11/16/21 Negative covid 11/9/21. Cm transition of care: Pod #1 gastric bypass, currently on 2lnc, LR, pt up oob chair- may be staying an additional night. Pt lives in two story home with boyfriend. Added Aunt as legal nok. CM/SS assigned following for discharge planning needs.  Electronically signed by ITZEL Nichols on 11/16/2021 at 3:17 PM

## 2021-11-17 ENCOUNTER — APPOINTMENT (OUTPATIENT)
Dept: CT IMAGING | Age: 51
DRG: 403 | End: 2021-11-17
Attending: SURGERY
Payer: COMMERCIAL

## 2021-11-17 LAB
ALBUMIN SERPL-MCNC: 3.7 G/DL (ref 3.5–5.2)
ALP BLD-CCNC: 71 U/L (ref 35–104)
ALT SERPL-CCNC: 47 U/L (ref 0–32)
ANION GAP SERPL CALCULATED.3IONS-SCNC: 10 MMOL/L (ref 7–16)
AST SERPL-CCNC: 48 U/L (ref 0–31)
BILIRUB SERPL-MCNC: 0.7 MG/DL (ref 0–1.2)
BUN BLDV-MCNC: 6 MG/DL (ref 6–20)
CALCIUM SERPL-MCNC: 8.9 MG/DL (ref 8.6–10.2)
CHLORIDE BLD-SCNC: 100 MMOL/L (ref 98–107)
CO2: 28 MMOL/L (ref 22–29)
CREAT SERPL-MCNC: 0.7 MG/DL (ref 0.5–1)
GFR AFRICAN AMERICAN: >60
GFR NON-AFRICAN AMERICAN: >60 ML/MIN/1.73
GLUCOSE BLD-MCNC: 93 MG/DL (ref 74–99)
HCT VFR BLD CALC: 37.7 % (ref 34–48)
HCT VFR BLD CALC: 39.9 % (ref 34–48)
HEMOGLOBIN: 12.7 G/DL (ref 11.5–15.5)
HEMOGLOBIN: 13 G/DL (ref 11.5–15.5)
LACTIC ACID, SEPSIS: 1.1 MMOL/L (ref 0.5–1.9)
MCH RBC QN AUTO: 30.8 PG (ref 26–35)
MCH RBC QN AUTO: 31.4 PG (ref 26–35)
MCHC RBC AUTO-ENTMCNC: 32.6 % (ref 32–34.5)
MCHC RBC AUTO-ENTMCNC: 33.7 % (ref 32–34.5)
MCV RBC AUTO: 93.3 FL (ref 80–99.9)
MCV RBC AUTO: 94.5 FL (ref 80–99.9)
PDW BLD-RTO: 13.4 FL (ref 11.5–15)
PDW BLD-RTO: 13.6 FL (ref 11.5–15)
PLATELET # BLD: 199 E9/L (ref 130–450)
PLATELET # BLD: 201 E9/L (ref 130–450)
PMV BLD AUTO: 10 FL (ref 7–12)
PMV BLD AUTO: 9.9 FL (ref 7–12)
POTASSIUM SERPL-SCNC: 3.6 MMOL/L (ref 3.5–5)
RBC # BLD: 4.04 E12/L (ref 3.5–5.5)
RBC # BLD: 4.22 E12/L (ref 3.5–5.5)
SODIUM BLD-SCNC: 138 MMOL/L (ref 132–146)
TOTAL PROTEIN: 7.1 G/DL (ref 6.4–8.3)
WBC # BLD: 9.4 E9/L (ref 4.5–11.5)
WBC # BLD: 9.4 E9/L (ref 4.5–11.5)

## 2021-11-17 PROCEDURE — 1200000000 HC SEMI PRIVATE

## 2021-11-17 PROCEDURE — 83605 ASSAY OF LACTIC ACID: CPT

## 2021-11-17 PROCEDURE — 99024 POSTOP FOLLOW-UP VISIT: CPT | Performed by: SURGERY

## 2021-11-17 PROCEDURE — 2700000000 HC OXYGEN THERAPY PER DAY

## 2021-11-17 PROCEDURE — 6370000000 HC RX 637 (ALT 250 FOR IP): Performed by: SURGERY

## 2021-11-17 PROCEDURE — 2580000003 HC RX 258: Performed by: SURGERY

## 2021-11-17 PROCEDURE — 74177 CT ABD & PELVIS W/CONTRAST: CPT

## 2021-11-17 PROCEDURE — 85027 COMPLETE CBC AUTOMATED: CPT

## 2021-11-17 PROCEDURE — 80053 COMPREHEN METABOLIC PANEL: CPT

## 2021-11-17 PROCEDURE — 36415 COLL VENOUS BLD VENIPUNCTURE: CPT

## 2021-11-17 PROCEDURE — 6370000000 HC RX 637 (ALT 250 FOR IP): Performed by: INTERNAL MEDICINE

## 2021-11-17 PROCEDURE — 2500000003 HC RX 250 WO HCPCS: Performed by: SURGERY

## 2021-11-17 PROCEDURE — 87040 BLOOD CULTURE FOR BACTERIA: CPT

## 2021-11-17 PROCEDURE — 6360000002 HC RX W HCPCS: Performed by: SURGERY

## 2021-11-17 PROCEDURE — 71260 CT THORAX DX C+: CPT

## 2021-11-17 PROCEDURE — 6360000004 HC RX CONTRAST MEDICATION: Performed by: RADIOLOGY

## 2021-11-17 RX ORDER — ACETAMINOPHEN 160 MG/5ML
650 SUSPENSION, ORAL (FINAL DOSE FORM) ORAL EVERY 6 HOURS PRN
Status: DISCONTINUED | OUTPATIENT
Start: 2021-11-17 | End: 2021-11-24 | Stop reason: HOSPADM

## 2021-11-17 RX ADMIN — MORPHINE SULFATE 4 MG: 4 INJECTION, SOLUTION INTRAMUSCULAR; INTRAVENOUS at 06:18

## 2021-11-17 RX ADMIN — IOPAMIDOL 75 ML: 755 INJECTION, SOLUTION INTRAVENOUS at 20:41

## 2021-11-17 RX ADMIN — LEVOTHYROXINE SODIUM 25 MCG: 25 TABLET ORAL at 06:18

## 2021-11-17 RX ADMIN — SODIUM CHLORIDE, POTASSIUM CHLORIDE, SODIUM LACTATE AND CALCIUM CHLORIDE: 600; 310; 30; 20 INJECTION, SOLUTION INTRAVENOUS at 15:52

## 2021-11-17 RX ADMIN — IOHEXOL 50 ML: 240 INJECTION, SOLUTION INTRATHECAL; INTRAVASCULAR; INTRAVENOUS; ORAL at 20:41

## 2021-11-17 RX ADMIN — METRONIDAZOLE 500 MG: 500 INJECTION, SOLUTION INTRAVENOUS at 18:48

## 2021-11-17 RX ADMIN — MORPHINE SULFATE 4 MG: 4 INJECTION, SOLUTION INTRAMUSCULAR; INTRAVENOUS at 08:30

## 2021-11-17 RX ADMIN — ACETAMINOPHEN 650 MG: 160 SUSPENSION ORAL at 15:33

## 2021-11-17 RX ADMIN — MORPHINE SULFATE 4 MG: 4 INJECTION, SOLUTION INTRAMUSCULAR; INTRAVENOUS at 15:20

## 2021-11-17 RX ADMIN — MORPHINE SULFATE 4 MG: 4 INJECTION, SOLUTION INTRAMUSCULAR; INTRAVENOUS at 02:17

## 2021-11-17 RX ADMIN — WATER 1000 MG: 1 INJECTION INTRAMUSCULAR; INTRAVENOUS; SUBCUTANEOUS at 18:47

## 2021-11-17 RX ADMIN — LISINOPRIL 5 MG: 5 TABLET ORAL at 08:30

## 2021-11-17 RX ADMIN — MORPHINE SULFATE 4 MG: 4 INJECTION, SOLUTION INTRAMUSCULAR; INTRAVENOUS at 11:21

## 2021-11-17 RX ADMIN — MORPHINE SULFATE 4 MG: 4 INJECTION, SOLUTION INTRAMUSCULAR; INTRAVENOUS at 21:45

## 2021-11-17 ASSESSMENT — PAIN SCALES - GENERAL
PAINLEVEL_OUTOF10: 3
PAINLEVEL_OUTOF10: 5
PAINLEVEL_OUTOF10: 8
PAINLEVEL_OUTOF10: 4
PAINLEVEL_OUTOF10: 8
PAINLEVEL_OUTOF10: 8
PAINLEVEL_OUTOF10: 9
PAINLEVEL_OUTOF10: 8

## 2021-11-17 ASSESSMENT — PAIN DESCRIPTION - PROGRESSION: CLINICAL_PROGRESSION: NOT CHANGED

## 2021-11-17 NOTE — PROGRESS NOTES
Likely d/c today if labs are ok, she feels better slightly and is tolerating liquids.    Jessica Lujan MD

## 2021-11-17 NOTE — PLAN OF CARE
Problem: Falls - Risk of:  Goal: Will remain free from falls  Description: Will remain free from falls  Outcome: Met This Shift     Problem: Pain:  Description: Pain management should include both nonpharmacologic and pharmacologic interventions.   Goal: Control of acute pain  Description: Control of acute pain  Outcome: Met This Shift     Problem: Breathing Pattern - Ineffective:  Goal: Ability to achieve and maintain a regular respiratory rate will improve  Description: Ability to achieve and maintain a regular respiratory rate will improve  Outcome: Met This Shift

## 2021-11-17 NOTE — CARE COORDINATION
11/17/21 Discharge Review; plans for discharge to home today POD #2 Gastric Bypass. Pt up oob without much difficulties. Currently on 2lnc- watch for discharge oxygen needs. pts  Debby Logan will provide a ride at discharge.  Electronically signed by Cecy Paul RN on 11/17/2021 at 9:30 AM

## 2021-11-18 ENCOUNTER — TELEPHONE (OUTPATIENT)
Dept: BARIATRICS/WEIGHT MGMT | Age: 51
End: 2021-11-18

## 2021-11-18 LAB
ADENOVIRUS BY PCR: NOT DETECTED
BORDETELLA PARAPERTUSSIS BY PCR: NOT DETECTED
BORDETELLA PERTUSSIS BY PCR: NOT DETECTED
CHLAMYDOPHILIA PNEUMONIAE BY PCR: NOT DETECTED
CORONAVIRUS 229E BY PCR: NOT DETECTED
CORONAVIRUS HKU1 BY PCR: NOT DETECTED
CORONAVIRUS NL63 BY PCR: NOT DETECTED
CORONAVIRUS OC43 BY PCR: NOT DETECTED
HUMAN METAPNEUMOVIRUS BY PCR: NOT DETECTED
HUMAN RHINOVIRUS/ENTEROVIRUS BY PCR: NOT DETECTED
INFLUENZA A BY PCR: NOT DETECTED
INFLUENZA B BY PCR: NOT DETECTED
MYCOPLASMA PNEUMONIAE BY PCR: NOT DETECTED
PARAINFLUENZA VIRUS 1 BY PCR: NOT DETECTED
PARAINFLUENZA VIRUS 2 BY PCR: NOT DETECTED
PARAINFLUENZA VIRUS 3 BY PCR: NOT DETECTED
PARAINFLUENZA VIRUS 4 BY PCR: NOT DETECTED
RESPIRATORY SYNCYTIAL VIRUS BY PCR: NOT DETECTED
SARS-COV-2, PCR: DETECTED

## 2021-11-18 PROCEDURE — 2580000003 HC RX 258: Performed by: SURGERY

## 2021-11-18 PROCEDURE — 6370000000 HC RX 637 (ALT 250 FOR IP): Performed by: SURGERY

## 2021-11-18 PROCEDURE — 6370000000 HC RX 637 (ALT 250 FOR IP): Performed by: INTERNAL MEDICINE

## 2021-11-18 PROCEDURE — 1200000000 HC SEMI PRIVATE

## 2021-11-18 PROCEDURE — 6360000002 HC RX W HCPCS: Performed by: SURGERY

## 2021-11-18 PROCEDURE — 2500000003 HC RX 250 WO HCPCS: Performed by: SURGERY

## 2021-11-18 PROCEDURE — 99024 POSTOP FOLLOW-UP VISIT: CPT | Performed by: SURGERY

## 2021-11-18 PROCEDURE — 0202U NFCT DS 22 TRGT SARS-COV-2: CPT

## 2021-11-18 PROCEDURE — 2700000000 HC OXYGEN THERAPY PER DAY

## 2021-11-18 RX ORDER — PANTOPRAZOLE SODIUM 20 MG/1
20 TABLET, DELAYED RELEASE ORAL
Status: DISCONTINUED | OUTPATIENT
Start: 2021-11-18 | End: 2021-11-24 | Stop reason: HOSPADM

## 2021-11-18 RX ORDER — TRAMADOL HYDROCHLORIDE 50 MG/1
50 TABLET ORAL EVERY 6 HOURS PRN
Qty: 12 TABLET | Refills: 0 | Status: SHIPPED | OUTPATIENT
Start: 2021-11-18 | End: 2021-11-21

## 2021-11-18 RX ADMIN — MORPHINE SULFATE 4 MG: 4 INJECTION, SOLUTION INTRAMUSCULAR; INTRAVENOUS at 01:26

## 2021-11-18 RX ADMIN — MORPHINE SULFATE 4 MG: 4 INJECTION, SOLUTION INTRAMUSCULAR; INTRAVENOUS at 09:15

## 2021-11-18 RX ADMIN — LISINOPRIL 5 MG: 5 TABLET ORAL at 09:10

## 2021-11-18 RX ADMIN — MORPHINE SULFATE 4 MG: 4 INJECTION, SOLUTION INTRAMUSCULAR; INTRAVENOUS at 21:11

## 2021-11-18 RX ADMIN — Medication 10 ML: at 09:16

## 2021-11-18 RX ADMIN — MORPHINE SULFATE 2 MG: 2 INJECTION, SOLUTION INTRAMUSCULAR; INTRAVENOUS at 14:52

## 2021-11-18 RX ADMIN — LEVOTHYROXINE SODIUM 25 MCG: 25 TABLET ORAL at 06:23

## 2021-11-18 RX ADMIN — Medication 10 ML: at 21:32

## 2021-11-18 RX ADMIN — METRONIDAZOLE 500 MG: 500 INJECTION, SOLUTION INTRAVENOUS at 09:37

## 2021-11-18 RX ADMIN — METRONIDAZOLE 500 MG: 500 INJECTION, SOLUTION INTRAVENOUS at 01:32

## 2021-11-18 RX ADMIN — METRONIDAZOLE 500 MG: 500 INJECTION, SOLUTION INTRAVENOUS at 17:06

## 2021-11-18 RX ADMIN — WATER 1000 MG: 1 INJECTION INTRAMUSCULAR; INTRAVENOUS; SUBCUTANEOUS at 17:05

## 2021-11-18 RX ADMIN — ACETAMINOPHEN 650 MG: 160 SUSPENSION ORAL at 21:13

## 2021-11-18 RX ADMIN — PANTOPRAZOLE SODIUM 20 MG: 20 TABLET, DELAYED RELEASE ORAL at 22:57

## 2021-11-18 RX ADMIN — SODIUM CHLORIDE, POTASSIUM CHLORIDE, SODIUM LACTATE AND CALCIUM CHLORIDE: 600; 310; 30; 20 INJECTION, SOLUTION INTRAVENOUS at 22:57

## 2021-11-18 RX ADMIN — MORPHINE SULFATE 4 MG: 4 INJECTION, SOLUTION INTRAMUSCULAR; INTRAVENOUS at 06:23

## 2021-11-18 ASSESSMENT — PAIN SCALES - GENERAL
PAINLEVEL_OUTOF10: 7
PAINLEVEL_OUTOF10: 7
PAINLEVEL_OUTOF10: 9
PAINLEVEL_OUTOF10: 7
PAINLEVEL_OUTOF10: 7
PAINLEVEL_OUTOF10: 0
PAINLEVEL_OUTOF10: 8
PAINLEVEL_OUTOF10: 9

## 2021-11-18 ASSESSMENT — PAIN DESCRIPTION - PROGRESSION: CLINICAL_PROGRESSION: NOT CHANGED

## 2021-11-18 ASSESSMENT — PAIN DESCRIPTION - PAIN TYPE: TYPE: SURGICAL PAIN

## 2021-11-18 NOTE — CARE COORDINATION
11/18/21 Pending Covid Testing. Cm transition of care: Pod #3 gastric bypass, on 4lnc- pt okay with [employer] [facility]. Need Testing and orders for CM to fax- spoke to [facility].  Jhony Burnette to provide a ride home. Watch for oxygen orders/testing. CM/SS assigned to follow. Electronically signed by ITZEL Avelar on 11/18/2021 at 11:55 AM      Addendum: positive covid noted.  Electronically signed by ITZEL Avelar on 11/18/2021 at 2:01 PM

## 2021-11-18 NOTE — ACP (ADVANCE CARE PLANNING)
Advance Care Planning     Advance Care Planning Activator (Inpatient)  Conversation Note      Date of ACP Conversation: 11/18/2021     Conversation Conducted with: Alex Lepe    ACP Activator: Mainor Gregg RN      Health Care Decision Maker:     Current Designated Health Care Decision Maker:     Primary Decision Maker: Bella Denver - [phone]    Secondary Decision Maker: [address] Preferences    Ventilation: \"If you were in your present state of health and suddenly became very ill and were unable to breathe on your own, what would your preference be about the use of a ventilator (breathing machine) if it were available to you? \"      Would the patient desire the use of ventilator (breathing machine)?: yes    \"If your health worsens and it becomes clear that your chance of recovery is unlikely, what would your preference be about the use of a ventilator (breathing machine) if it were available to you? \"     Would the patient desire the use of ventilator (breathing machine)?: Yes      Resuscitation  \"CPR works best to restart the heart when there is a sudden event, like a heart attack, in someone who is otherwise healthy. Unfortunately, CPR does not typically restart the heart for people who have serious health conditions or who are very sick. \"    \"In the event your heart stopped as a result of an underlying serious health condition, would you want attempts to be made to restart your heart (answer \"yes\" for attempt to resuscitate) or would you prefer a natural death (answer \"no\" for do not attempt to resuscitate)? \" yes       [] Yes   [x] No   Educated Patient / Lawrnce Mater regarding differences between Advance Directives and portable DNR orders.         Conversation Outcomes:  [] ACP discussion completed  [] Existing advance directive reviewed with patient; no changes to patient's previously recorded wishes  [] New Advance Directive completed  [] Portable Do Not Rescitate prepared for Provider review and signature  [] POLST/POST/MOLST/MOST prepared for Provider review and signature      Follow-up plan:    [] Schedule follow-up conversation to continue planning  [] Referred individual to Provider for additional questions/concerns   [] Advised patient/agent/surrogate to review completed ACP document and update if needed with changes in condition, patient preferences or care setting    [x] This note routed to one or more involved healthcare providers      Electronically signed by ITZEL Castaneda on 11/18/2021 at 1:58 PM

## 2021-11-18 NOTE — DISCHARGE SUMMARY
Physician Discharge Summary     Hany Sue  [id number]    Admit date: 11/15/2021    Discharge date and time: 11/18/2021     Admitting Physician: Wang Green MD     Condition: stable    Patient Active Problem List   Diagnosis    Gastroesophageal reflux disease without esophagitis    Essential hypertension    Type 2 diabetes mellitus without complication, without long-term current use of insulin ([facility] [address]    Pure hypercholesterolemia    Morbid obesity ([facility] [address]    S/P gastric bypass       Hospital Course: Hany Sue is a 49 y.o. female three day post-op from a laparoscopic Gastric Bypass. She did have pain and tachycardia after surgery but workup with labs and CT showed no acute pathology, pain has been well controlled over night, walking well in the halls. Labs normal. Bustamante out. She is tolerating the Bariatric clear liquid diet with no nausea, no pain. Incisions all clean and dry, glue in place. Lab Results   Component Value Date    WBC 9.4 11/17/2021    HGB 12.7 11/17/2021     11/17/2021     11/17/2021     11/17/2021    K 3.6 11/17/2021    BUN 6 11/17/2021    CREATININE 0.7 11/17/2021    GLUCOSE 93 11/17/2021    LABGLOM >60 11/17/2021    LABALBU 3.7 11/17/2021    PROT 7.1 11/17/2021    CALCIUM 8.9 11/17/2021    BILITOT 0.7 11/17/2021    ALKPHOS 71 11/17/2021    AST 48 11/17/2021    ALT 47 11/17/2021       Discharge Exam:   VITALS: BP (!) 157/74   Pulse 106   Temp 100.4 °F (38 °C) (Oral)   Resp 16   Ht 5' 4\" (1.626 m)   Wt 246 lb (111.6 kg)   SpO2 95%   BMI 42.23 kg/m²     General appearance: alert, appears stated age and cooperative  Neck: no adenopathy, no carotid bruit, no JVD, supple, symmetrical, trachea midline and thyroid not enlarged, symmetric, no tenderness/mass/nodules  Lungs: clear to auscultation bilaterally  Heart: regular rate and rhythm, S1, S2 normal, no murmur, click, rub or gallop  Abdomen: Incisions clean and dry, surgical glue in place.   Extremities: extremities normal, atraumatic, no cyanosis or edema    Disposition: home    Patient Instructions:   Hold medicines for blood pressure, diabetes and cholesterol. Do not take diuretics at home. Take Beta-blockers but decrease the dose by half. Ok to restart Aspirin and other blood thinners. Medication List      START taking these medications    traMADol 50 MG tablet  Commonly known as: Ultram  Take 1 tablet by mouth every 6 hours as needed for Pain for up to 3 days. Intended supply: 3 days. Take lowest dose possible to manage pain        CONTINUE taking these medications    CALCIUM 600 + D PO     cyclobenzaprine 10 MG tablet  Commonly known as: FLEXERIL     * DULoxetine 60 MG extended release capsule  Commonly known as: CYMBALTA     * DULoxetine 30 MG extended release capsule  Commonly known as: CYMBALTA     hydrOXYzine 50 MG capsule  Commonly known as: VISTARIL     Latuda 60 MG Tabs tablet  Generic drug: lurasidone     levothyroxine 25 MCG tablet  Commonly known as: SYNTHROID     omeprazole 20 MG delayed release capsule  Commonly known as: PriLOSEC  Take 1 capsule by mouth daily     ondansetron 4 MG disintegrating tablet  Commonly known as: ZOFRAN-ODT  Place 1 tablet under the tongue every 8 hours as needed for Nausea or Vomiting     ONE-A-DAY FOR HER VITACRAVES PO     pravastatin 20 MG tablet  Commonly known as: PRAVACHOL     SUPER B COMPLEX PO     traZODone 100 MG tablet  Commonly known as: DESYREL         * This list has 2 medication(s) that are the same as other medications prescribed for you. Read the directions carefully, and ask your doctor or other care provider to review them with you.             STOP taking these medications    lisinopril 5 MG tablet  Commonly known as: PRINIVIL;ZESTRIL     metFORMIN 500 MG tablet  Commonly known as: GLUCOPHAGE           Where to Get Your Medications      These medications were sent to [address] [place], [place] - [address] - P [phone] Laurette Cheadle [phone]  [address] [place] [facility] [address]    Phone: [phone]   · traMADol 50 MG tablet       Activity:  no lifting, or strenuous exercise for one or two weeks. No driving for one week. Diet:  Bariatric clear liquid for 2 days, then bariatric full liquids for 2 weeks, 1 oz every 15 minutes. Wound Care: shower tomorrow then leave the incisions open to air     Follow-up with Dr. Ruel Stahl in 2 weeks.     Signed:  Billy Darden MD  11/18/2021  7:47 AM

## 2021-11-18 NOTE — TELEPHONE ENCOUNTER
Patient stayed in hospital and was just released today. Call placed to patient and answering machine on. Message left for her to make sure she is taking her omeprazole. I told her Daphney Alexis would be calling her tomorrow to review diet.

## 2021-11-19 ENCOUNTER — TELEPHONE (OUTPATIENT)
Dept: BARIATRICS/WEIGHT MGMT | Age: 51
End: 2021-11-19

## 2021-11-19 DIAGNOSIS — Z71.3 NUTRITIONAL COUNSELING: Primary | ICD-10-CM

## 2021-11-19 DIAGNOSIS — Z00.8 NUTRITIONAL ASSESSMENT: ICD-10-CM

## 2021-11-19 PROCEDURE — 2580000003 HC RX 258: Performed by: INTERNAL MEDICINE

## 2021-11-19 PROCEDURE — 6370000000 HC RX 637 (ALT 250 FOR IP): Performed by: INTERNAL MEDICINE

## 2021-11-19 PROCEDURE — 2500000003 HC RX 250 WO HCPCS: Performed by: SURGERY

## 2021-11-19 PROCEDURE — 6360000002 HC RX W HCPCS: Performed by: SURGERY

## 2021-11-19 PROCEDURE — 6370000000 HC RX 637 (ALT 250 FOR IP): Performed by: SURGERY

## 2021-11-19 PROCEDURE — 1200000000 HC SEMI PRIVATE

## 2021-11-19 PROCEDURE — 2500000003 HC RX 250 WO HCPCS: Performed by: INTERNAL MEDICINE

## 2021-11-19 PROCEDURE — 2580000003 HC RX 258: Performed by: SURGERY

## 2021-11-19 PROCEDURE — 2700000000 HC OXYGEN THERAPY PER DAY

## 2021-11-19 RX ORDER — 0.9 % SODIUM CHLORIDE 0.9 %
30 INTRAVENOUS SOLUTION INTRAVENOUS PRN
Status: DISCONTINUED | OUTPATIENT
Start: 2021-11-19 | End: 2021-11-24 | Stop reason: HOSPADM

## 2021-11-19 RX ORDER — DEXAMETHASONE 6 MG/1
6 TABLET ORAL EVERY 24 HOURS
Status: DISCONTINUED | OUTPATIENT
Start: 2021-11-19 | End: 2021-11-24 | Stop reason: HOSPADM

## 2021-11-19 RX ORDER — DEXAMETHASONE 6 MG/1
6 TABLET ORAL EVERY 12 HOURS SCHEDULED
Status: DISCONTINUED | OUTPATIENT
Start: 2021-11-19 | End: 2021-11-19

## 2021-11-19 RX ADMIN — APIXABAN 5 MG: 5 TABLET, FILM COATED ORAL at 20:55

## 2021-11-19 RX ADMIN — MORPHINE SULFATE 4 MG: 4 INJECTION, SOLUTION INTRAMUSCULAR; INTRAVENOUS at 23:42

## 2021-11-19 RX ADMIN — DEXAMETHASONE 6 MG: 6 TABLET ORAL at 20:55

## 2021-11-19 RX ADMIN — METRONIDAZOLE 500 MG: 500 INJECTION, SOLUTION INTRAVENOUS at 02:29

## 2021-11-19 RX ADMIN — LEVOTHYROXINE SODIUM 25 MCG: 25 TABLET ORAL at 05:52

## 2021-11-19 RX ADMIN — PANTOPRAZOLE SODIUM 20 MG: 20 TABLET, DELAYED RELEASE ORAL at 05:52

## 2021-11-19 RX ADMIN — REMDESIVIR 200 MG: 100 INJECTION, POWDER, LYOPHILIZED, FOR SOLUTION INTRAVENOUS at 22:35

## 2021-11-19 RX ADMIN — METRONIDAZOLE 500 MG: 500 INJECTION, SOLUTION INTRAVENOUS at 09:41

## 2021-11-19 RX ADMIN — MORPHINE SULFATE 2 MG: 2 INJECTION, SOLUTION INTRAMUSCULAR; INTRAVENOUS at 08:13

## 2021-11-19 RX ADMIN — ACETAMINOPHEN 650 MG: 160 SUSPENSION ORAL at 15:16

## 2021-11-19 RX ADMIN — WATER 1000 MG: 1 INJECTION INTRAMUSCULAR; INTRAVENOUS; SUBCUTANEOUS at 18:41

## 2021-11-19 RX ADMIN — MORPHINE SULFATE 4 MG: 4 INJECTION, SOLUTION INTRAMUSCULAR; INTRAVENOUS at 21:02

## 2021-11-19 RX ADMIN — MORPHINE SULFATE 4 MG: 4 INJECTION, SOLUTION INTRAMUSCULAR; INTRAVENOUS at 03:55

## 2021-11-19 RX ADMIN — METRONIDAZOLE 500 MG: 500 INJECTION, SOLUTION INTRAVENOUS at 18:41

## 2021-11-19 RX ADMIN — Medication 10 ML: at 09:20

## 2021-11-19 RX ADMIN — MORPHINE SULFATE 2 MG: 2 INJECTION, SOLUTION INTRAMUSCULAR; INTRAVENOUS at 13:25

## 2021-11-19 ASSESSMENT — PAIN DESCRIPTION - ONSET: ONSET: ON-GOING

## 2021-11-19 ASSESSMENT — PAIN SCALES - GENERAL
PAINLEVEL_OUTOF10: 8
PAINLEVEL_OUTOF10: 6
PAINLEVEL_OUTOF10: 8
PAINLEVEL_OUTOF10: 5
PAINLEVEL_OUTOF10: 8

## 2021-11-19 ASSESSMENT — PAIN DESCRIPTION - LOCATION: LOCATION: ABDOMEN

## 2021-11-19 ASSESSMENT — PAIN DESCRIPTION - PROGRESSION: CLINICAL_PROGRESSION: NOT CHANGED

## 2021-11-19 ASSESSMENT — PAIN DESCRIPTION - FREQUENCY: FREQUENCY: CONTINUOUS

## 2021-11-19 ASSESSMENT — PAIN DESCRIPTION - DESCRIPTORS: DESCRIPTORS: DISCOMFORT;ACHING

## 2021-11-19 ASSESSMENT — PAIN DESCRIPTION - PAIN TYPE: TYPE: SURGICAL PAIN

## 2021-11-19 NOTE — TELEPHONE ENCOUNTER
Jeronimo Khan called pt to check to see how the pt is doing since being d/c from the hospital nad review her diet. Pt was N/A. Jeronimo Khan LM asking pt to call the [facility] at [phone].

## 2021-11-19 NOTE — CARE COORDINATION
SOCIAL WORK / DISCHARGE PLANNING:  COVID positive 11/18, pt was to discharge yesterday, tested positive and was transferred to [facility]. Pt to discharge today home today, qualified for home O2, testing and script faxed to [facility] as well as spoke to [employer]. They will be able to deliver portable to hospital today.                  Electronically signed by BAUTISTA Castellanos on 11/19/2021 at 4:54 PM

## 2021-11-19 NOTE — PROGRESS NOTES
GENERAL SURGERY  DAILY PROGRESS NOTE  11/19/2021    Cc: febrile    Subjective:  Martha Hu was discharged but then COVID returned positive and transferred to [facility] floor in the afternoon. Tmax 101.5 yesterday. No nause aor vomiting, has some abdominal soreness. Tolerating liquids    Objective:  /73   Pulse 100   Temp 98.3 °F (36.8 °C) (Infrared)   Resp 17   Ht 5' 4\" (1.626 m)   Wt 246 lb (111.6 kg)   SpO2 93%   BMI 42.23 kg/m²     General appearance: alert, cooperative and in no acute distress. Eyes: grossly normal  Lungs: nonlabored breathing on 3LNC  Heart: regular, 100s  Abdomen: soft, appropriately tender, non distended, incisions clean  Neurologic: Alert and oriented x 3. Grossly normal  Musculoskeletal: No clubbing cyanosis    Recent Labs     11/17/21  1825 11/17/21  1143 11/16/21  0442   WBC 9.4 9.4 13.3*   HGB 12.7 13.0 13.8   HCT 37.7 39.9 40.6   MCV 93.3 94.5 94.0    199 213      Lab Results   Component Value Date     11/17/2021    K 3.6 11/17/2021     11/17/2021    CO2 28 11/17/2021    BUN 6 11/17/2021    CREATININE 0.7 11/17/2021    GLUCOSE 93 11/17/2021    CALCIUM 8.9 11/17/2021         Assessment/Plan:  49 y.o. female POD3 s/p lap andreea en y gastric bypass and hiatal hernia repair.  Postop fevers and chills likely due to COVID    Pain controlled  Continue bariatric liquids  IVF for now  Day 2 rocephin/flagyl empirically  Discharge planning possibly today - she wants to go home with home care and home O2  Will reach out to IM team for any further recommendations prior to dc    Electronically signed by Wali Fernandez MD on 11/19/2021 at 7:02 AM

## 2021-11-19 NOTE — PROGRESS NOTES
Physician Progress Note      Vanita Blas  CSN #:                  [id number]  :                       1970  ADMIT DATE:       11/15/2021 7:00 AM  [address] [place] DATE:  RESPONDING  PROVIDER #:        Flory Montilla MD          QUERY TEXT:    Pt admitted for  Laparoscopic Randy-en-Y Gastric Bypass and and note need for   up to 4L NC oxygen. If possible, please document in the progress notes and   discharge summary if you are evaluating and/or treating any of the following: The medical record reflects the following:  Risk Factors: Obesity, Randy-en-Y gastric bypass  Clinical Indicators: : 90% on 1L nc, 95% on 4L nc, : 95% on 4L nc;   Max pain level-8/9. Treatment: 1-4L NC oxygen, pulse ox monitoring and assessments. Thank you for your time,    Megan Rodrigez RN, BSN, CCDS  [phone]  Options provided:  -- Acute pulmonary insufficiency following surgery  -- Acute respiratory failure due to Morbid Obesity, and unrelated to surgery  -- Acute respiratory failure due to the surgery  -- Other - I will add my own diagnosis  -- Disagree - Not applicable / Not valid  -- Disagree - Clinically unable to determine / Unknown  -- Refer to Clinical Documentation Reviewer    PROVIDER RESPONSE TEXT:    This patient is in acute respiratory failure due to Morbid Obesity, and   unrelated to surgery. Query created by:  Silvino Washington on 2021 2:03 PM      Electronically signed by:  Flory Montilla MD 2021 7:22 AM

## 2021-11-20 LAB
ALBUMIN SERPL-MCNC: 3.1 G/DL (ref 3.5–5.2)
ALP BLD-CCNC: 71 U/L (ref 35–104)
ALT SERPL-CCNC: 29 U/L (ref 0–32)
ANION GAP SERPL CALCULATED.3IONS-SCNC: 12 MMOL/L (ref 7–16)
AST SERPL-CCNC: 50 U/L (ref 0–31)
BASOPHILS ABSOLUTE: 0.01 E9/L (ref 0–0.2)
BASOPHILS RELATIVE PERCENT: 0.3 % (ref 0–2)
BILIRUB SERPL-MCNC: 0.4 MG/DL (ref 0–1.2)
BUN BLDV-MCNC: 5 MG/DL (ref 6–20)
C-REACTIVE PROTEIN: 7.5 MG/DL (ref 0–0.4)
CALCIUM SERPL-MCNC: 8.3 MG/DL (ref 8.6–10.2)
CHLORIDE BLD-SCNC: 102 MMOL/L (ref 98–107)
CO2: 26 MMOL/L (ref 22–29)
CREAT SERPL-MCNC: 0.5 MG/DL (ref 0.5–1)
D DIMER: 1564 NG/ML DDU
EOSINOPHILS ABSOLUTE: 0 E9/L (ref 0.05–0.5)
EOSINOPHILS RELATIVE PERCENT: 0 % (ref 0–6)
FERRITIN: 567 NG/ML
FIBRINOGEN: >700 MG/DL (ref 225–540)
GFR AFRICAN AMERICAN: >60
GFR NON-AFRICAN AMERICAN: >60 ML/MIN/1.73
GLUCOSE BLD-MCNC: 98 MG/DL (ref 74–99)
HCT VFR BLD CALC: 38.3 % (ref 34–48)
HEMOGLOBIN: 12.9 G/DL (ref 11.5–15.5)
IMMATURE GRANULOCYTES #: 0.02 E9/L
IMMATURE GRANULOCYTES %: 0.6 % (ref 0–5)
INR BLD: 1.1
LACTATE DEHYDROGENASE: 334 U/L (ref 135–214)
LYMPHOCYTES ABSOLUTE: 0.7 E9/L (ref 1.5–4)
LYMPHOCYTES RELATIVE PERCENT: 19.3 % (ref 20–42)
MCH RBC QN AUTO: 30.7 PG (ref 26–35)
MCHC RBC AUTO-ENTMCNC: 33.7 % (ref 32–34.5)
MCV RBC AUTO: 91.2 FL (ref 80–99.9)
MONOCYTES ABSOLUTE: 0.31 E9/L (ref 0.1–0.95)
MONOCYTES RELATIVE PERCENT: 8.5 % (ref 2–12)
NEUTROPHILS ABSOLUTE: 2.59 E9/L (ref 1.8–7.3)
NEUTROPHILS RELATIVE PERCENT: 71.3 % (ref 43–80)
PDW BLD-RTO: 13 FL (ref 11.5–15)
PLATELET # BLD: 199 E9/L (ref 130–450)
PMV BLD AUTO: 9.9 FL (ref 7–12)
POTASSIUM SERPL-SCNC: 3.6 MMOL/L (ref 3.5–5)
PROCALCITONIN: 0.21 NG/ML (ref 0–0.08)
PROTHROMBIN TIME: 12.9 SEC (ref 9.3–12.4)
RBC # BLD: 4.2 E12/L (ref 3.5–5.5)
SODIUM BLD-SCNC: 140 MMOL/L (ref 132–146)
TOTAL PROTEIN: 6 G/DL (ref 6.4–8.3)
WBC # BLD: 3.6 E9/L (ref 4.5–11.5)

## 2021-11-20 PROCEDURE — 85378 FIBRIN DEGRADE SEMIQUANT: CPT

## 2021-11-20 PROCEDURE — 82728 ASSAY OF FERRITIN: CPT

## 2021-11-20 PROCEDURE — 85384 FIBRINOGEN ACTIVITY: CPT

## 2021-11-20 PROCEDURE — 1200000000 HC SEMI PRIVATE

## 2021-11-20 PROCEDURE — 2580000003 HC RX 258: Performed by: INTERNAL MEDICINE

## 2021-11-20 PROCEDURE — 85025 COMPLETE CBC W/AUTO DIFF WBC: CPT

## 2021-11-20 PROCEDURE — 6370000000 HC RX 637 (ALT 250 FOR IP): Performed by: INTERNAL MEDICINE

## 2021-11-20 PROCEDURE — 86140 C-REACTIVE PROTEIN: CPT

## 2021-11-20 PROCEDURE — 2500000003 HC RX 250 WO HCPCS: Performed by: INTERNAL MEDICINE

## 2021-11-20 PROCEDURE — 6360000002 HC RX W HCPCS: Performed by: SURGERY

## 2021-11-20 PROCEDURE — 36415 COLL VENOUS BLD VENIPUNCTURE: CPT

## 2021-11-20 PROCEDURE — 84145 PROCALCITONIN (PCT): CPT

## 2021-11-20 PROCEDURE — 6370000000 HC RX 637 (ALT 250 FOR IP): Performed by: SURGERY

## 2021-11-20 PROCEDURE — 2500000003 HC RX 250 WO HCPCS: Performed by: SURGERY

## 2021-11-20 PROCEDURE — 83615 LACTATE (LD) (LDH) ENZYME: CPT

## 2021-11-20 PROCEDURE — 85610 PROTHROMBIN TIME: CPT

## 2021-11-20 PROCEDURE — 2580000003 HC RX 258: Performed by: SURGERY

## 2021-11-20 PROCEDURE — 80053 COMPREHEN METABOLIC PANEL: CPT

## 2021-11-20 PROCEDURE — 2700000000 HC OXYGEN THERAPY PER DAY

## 2021-11-20 RX ADMIN — APIXABAN 5 MG: 5 TABLET, FILM COATED ORAL at 20:00

## 2021-11-20 RX ADMIN — DEXAMETHASONE 6 MG: 6 TABLET ORAL at 20:00

## 2021-11-20 RX ADMIN — WATER 1000 MG: 1 INJECTION INTRAMUSCULAR; INTRAVENOUS; SUBCUTANEOUS at 18:07

## 2021-11-20 RX ADMIN — MORPHINE SULFATE 4 MG: 4 INJECTION, SOLUTION INTRAMUSCULAR; INTRAVENOUS at 22:21

## 2021-11-20 RX ADMIN — METRONIDAZOLE 500 MG: 500 INJECTION, SOLUTION INTRAVENOUS at 02:05

## 2021-11-20 RX ADMIN — SODIUM CHLORIDE, POTASSIUM CHLORIDE, SODIUM LACTATE AND CALCIUM CHLORIDE: 600; 310; 30; 20 INJECTION, SOLUTION INTRAVENOUS at 14:25

## 2021-11-20 RX ADMIN — SODIUM CHLORIDE, POTASSIUM CHLORIDE, SODIUM LACTATE AND CALCIUM CHLORIDE: 600; 310; 30; 20 INJECTION, SOLUTION INTRAVENOUS at 23:45

## 2021-11-20 RX ADMIN — PANTOPRAZOLE SODIUM 20 MG: 20 TABLET, DELAYED RELEASE ORAL at 06:34

## 2021-11-20 RX ADMIN — LEVOTHYROXINE SODIUM 25 MCG: 25 TABLET ORAL at 06:34

## 2021-11-20 RX ADMIN — METRONIDAZOLE 500 MG: 500 INJECTION, SOLUTION INTRAVENOUS at 18:07

## 2021-11-20 RX ADMIN — MORPHINE SULFATE 2 MG: 2 INJECTION, SOLUTION INTRAMUSCULAR; INTRAVENOUS at 08:17

## 2021-11-20 RX ADMIN — REMDESIVIR 100 MG: 100 INJECTION, POWDER, LYOPHILIZED, FOR SOLUTION INTRAVENOUS at 15:45

## 2021-11-20 RX ADMIN — APIXABAN 5 MG: 5 TABLET, FILM COATED ORAL at 08:17

## 2021-11-20 RX ADMIN — Medication 5 ML: at 08:21

## 2021-11-20 RX ADMIN — METRONIDAZOLE 500 MG: 500 INJECTION, SOLUTION INTRAVENOUS at 10:30

## 2021-11-20 RX ADMIN — MORPHINE SULFATE 2 MG: 2 INJECTION, SOLUTION INTRAMUSCULAR; INTRAVENOUS at 18:07

## 2021-11-20 RX ADMIN — SODIUM CHLORIDE, POTASSIUM CHLORIDE, SODIUM LACTATE AND CALCIUM CHLORIDE: 600; 310; 30; 20 INJECTION, SOLUTION INTRAVENOUS at 02:05

## 2021-11-20 ASSESSMENT — PAIN SCALES - GENERAL
PAINLEVEL_OUTOF10: 8
PAINLEVEL_OUTOF10: 8
PAINLEVEL_OUTOF10: 0
PAINLEVEL_OUTOF10: 3
PAINLEVEL_OUTOF10: 4
PAINLEVEL_OUTOF10: 8

## 2021-11-20 NOTE — PROGRESS NOTES
GENERAL SURGERY  DAILY PROGRESS NOTE  11/20/2021    Cc: febrile    Subjective:  No new events overnight. Oxygen requirement down to 2 L per nursing. Objective:  BP (!) 118/58   Pulse 58   Temp 97.8 °F (36.6 °C)   Resp 20   Ht 5' 4\" (1.626 m)   Wt 246 lb (111.6 kg)   SpO2 90%   BMI 42.23 kg/m²     General appearance: No acute distress  Eyes: grossly normal  Lungs: nonlabored breathing on 2LNC  Heart: regular, 100s  Abdomen: Flat  Neurologic: Alert and oriented x 3. Grossly normal  Musculoskeletal: No clubbing cyanosis    Recent Labs     11/20/21  0501 11/17/21  1825 11/17/21  1143   WBC 3.6* 9.4 9.4   HGB 12.9 12.7 13.0   HCT 38.3 37.7 39.9   MCV 91.2 93.3 94.5    201 199      Lab Results   Component Value Date     11/20/2021    K 3.6 11/20/2021     11/20/2021    CO2 26 11/20/2021    BUN 5 11/20/2021    CREATININE 0.5 11/20/2021    GLUCOSE 98 11/20/2021    CALCIUM 8.3 11/20/2021         Assessment/Plan:  49 y.o. female POD4 s/p lap andreea en y gastric bypass and hiatal hernia repair.  Postop fevers and chills likely due to COVID    Continue bariatric liquids  Infectious disease following for treatment of COVID-19 infection  Pulmonology also consulted  Primary care provider aware of patient's need for supplemental oxygen  Discharge when okay with primary care provider, infectious disease, pulmonology    Electronically signed by Natalie Amaro MD on 11/20/2021 at 2:47 PM

## 2021-11-20 NOTE — CONSULTS
PULMONARY MEDICINE CONSULT    Consult requested by: Dr Yina Haines  Reason for consult: COVID-23 pneumonia    HPI  49year old woman with PMH of morbid obesity, depression, hyperlipidemia, hypertension and hypothyroidism admitted to hospital for bariatric surgery. Her hospital stay has been complicated with [id number] pneumonia and acute hypoxemic respiratory failure. She is being managed with dexamethasone, apixaban, ceftriaxone and metronidazole. /60   Pulse 84   Temp 98.8 °F (37.1 °C)   Resp 18   Ht 5' 4\" (1.626 m)   Wt 246 lb (111.6 kg)   SpO2 95%   BMI 42.23 kg/m²       A/P:  1) Acute hypoxemic respiratory failure secondary to severe COVID-19 pneumonia   --Oxygen supplementation to maintain sats > 89%, can use Vapotherm or BPAP if worsening  --Prone position recommended  --Antimicrobial regimen: Ceftriaxone + Metronidazole  --Antiviral regimen: RDexamethasone 6 mg/day X 10 days, pending inflammatory markers to decide if Tocilizumab/Baricitinib is indicated  --Cultures reviewed  --Inflammatory markers ordered  --Anticoagulation: Apixaban has been ordered by primary attending. Rest of supportive care as per primary team  Hypertension  --On antihypertensives    Hypothyroidism  --Management with thyroid hormone supplements    We will not follow daily. Please call intensivist on call if the patient's condition were to worsen. Thank you for allowing us to participate in the care of Ms Smith Nip    Review of Systems  General: denies weight gain, denies loss of appetite, fever, chills, night sweats. HEENT: denies headaches, dizziness, head trauma, visual changes, eye pain, tinnitus, nosebleeds, hoarseness or throat pain    Respiratory: denies chest pain, positive for mild dyspnea with exertion, negative for cough and hemoptysis  Cardiovascular: denies orthopnea, paroxysmal nocturnal dyspnea, leg swelling, and previous heart attack.     Gastrointestinal: denies pain, nausea vomiting, diarrhea, constipation, melena or bleeding. Genitourinary: denies hematuria, frequency, urgency or dysuria  Neurology: denies syncope, seizures, paralysis, paraesthesia   Endocrine: denies polyuria, polydipsia, skin or hair changes, and heat or cold intolerance  Musculoskeletal: denies joint pain, swelling, arthritis or myalgia  Hematologic: denies bleeding, adenopathy and easy bruising  Skin: denies rashes and skin discoloration  Psychiatry: denies depression     Physical Exam  General appearance: obese, not in pain or distress, in no respiratory distress    HEENT: Atraumatic/normocephalic, EOMI, SHANICE, pharynx clear, moist mucosa, redness of the uvula appreciated,   Neck: Supple, no jugular venous distension, lymphadenopathy, thyromegaly or carotid bruits  Chest: Equal but diminished breath sounds, no wheezing, no crackles and no tenderness over ribs   Cardiovascular: Normal S1 , S2, regular rate and rhythm, no murmur, rub or gallop  Abdomen: Normal sounds present, soft, lax with no tenderness, no hepatosplenomegaly, and no masses  Extremities: No edema. Pulses are equally present.    Skin: intact, no rashes   Neurologic: Alert and oriented x 3, No focal deficit     Investigations:  Labs, radiological imaging and cardiac work up were reviewed          SARS-COV-2 biomarkers  Recent Labs     11/17/21  1143   AST 48*   ALT 47*     Lab Results   Component Value Date    CHOL 217 09/03/2021    TRIG 180 09/03/2021    HDL 44 09/03/2021    LDLCALC 137 09/03/2021    LABVLDL 36 09/03/2021     Lab Results   Component Value Date/Time    VITD25 39 03/12/2021 09:04 AM     SARS-COV-2 biomarkers  Recent Labs     11/17/21  1143   AST 48*   ALT 47*     Lab Results   Component Value Date    CHOL 217 09/03/2021    TRIG 180 09/03/2021    HDL 44 09/03/2021    LDLCALC 137 09/03/2021    LABVLDL 36 09/03/2021     Lab Results   Component Value Date/Time    VITD25 39 03/12/2021 09:04 AM       Past Medical History:   Diagnosis Date    Depression     Hyperlipidemia     Hypertension     Morbid (severe) obesity due to excess calories ([facility] [address]     Thyroid disease      Family History   Problem Relation Age of Onset    Diabetes Mother      Social History     Socioeconomic History    Marital status:      Spouse name: Not on file    Number of children: 3    Years of education: Not on file    Highest education level: Not on file   Occupational History    Not on file   Tobacco Use    Smoking status: Former Smoker     Packs/day: 0.50     Years: 20.00     Pack years: 10.00     Types: Cigarettes     Start date: 1998     Quit date: 2021     Years since quittin.3    Smokeless tobacco: Never Used   Vaping Use    Vaping Use: Never used   Substance and Sexual Activity    Alcohol use: No    Drug use: No    Sexual activity: Yes     Partners: Male   Other Topics Concern    Not on file   Social History Narrative    Not on file     Social Determinants of Health     Financial Resource Strain:     Difficulty of Paying Living Expenses: Not on file   Food Insecurity:     Worried About [id number] in the Last Year: Not on file    Anais of Food in the Last Year: Not on file   Transportation Needs:     Lack of Transportation (Medical): Not on file    Lack of Transportation (Non-Medical):  Not on file   Physical Activity:     Days of Exercise per Week: Not on file    Minutes of Exercise per Session: Not on file   Stress:     Feeling of Stress : Not on file   Social Connections:     Frequency of Communication with Friends and Family: Not on file    Frequency of Social Gatherings with Friends and Family: Not on file    Attends Scientology Services: Not on file    Active Member of Clubs or Organizations: Not on file    Attends Club or Organization Meetings: Not on file    Marital Status: Not on file   Intimate Partner Violence:     Fear of Current or Ex-Partner: Not on file    Emotionally Abused: Not on file    Physically Abused: Not on file    Sexually Abused: Not on file   Housing Stability:     Unable to Pay for Housing in the Last Year: Not on file    Number of Places Lived in the Last Year: Not on file    Unstable Housing in the Last Year: Not on file     Current Facility-Administered Medications   Medication Dose Route Frequency Provider Last Rate Last Admin    apixaban (ELIQUIS) tablet 5 mg  5 mg Oral BID Lalita Caceres MD        dexamethasone (DECADRON) tablet 6 mg  6 mg Oral Q24H Sherry Soler MD        pantoprazole (PROTONIX) tablet 20 mg  20 mg Oral QAM AC Anabell Bowser MD   20 mg at 11/19/21 0552    acetaminophen (TYLENOL) suspension 650 mg  650 mg Oral Q6H PRN Anabell Bowser MD   650 mg at 11/19/21 1516    cefTRIAXone (ROCEPHIN) 1,000 mg in sterile water 10 mL IV syringe  1,000 mg IntraVENous Q24H Anabell Bowser MD   1,000 mg at 11/19/21 1841    metronidazole (FLAGYL) 500 mg in NaCl 100 mL IVPB premix  500 mg IntraVENous Q8H Anabell Bowser  mL/hr at 11/19/21 1841 500 mg at 11/19/21 1841    lisinopril (PRINIVIL;ZESTRIL) tablet 5 mg  5 mg Oral Daily Badi Altawil, MD   5 mg at 11/18/21 0910    levothyroxine (SYNTHROID) tablet 25 mcg  25 mcg Oral Daily Lalita Caceres MD   25 mcg at 11/19/21 3851    lactated ringers infusion   IntraVENous Continuous Anabell Bowser  mL/hr at 11/18/21 2257 New Bag at 11/18/21 2257    sodium chloride flush 0.9 % injection 5-40 mL  5-40 mL IntraVENous 2 times per day Anabell Bowser MD   10 mL at 11/19/21 0920    sodium chloride flush 0.9 % injection 5-40 mL  5-40 mL IntraVENous PRN Anabell Bowser MD        0.9 % sodium chloride infusion  25 mL IntraVENous PRN Anabell Bowser MD        morphine (PF) injection 2 mg  2 mg IntraVENous Q2H PRN Anabell Bowser MD   2 mg at 11/19/21 1325    Or    morphine injection 4 mg  4 mg IntraVENous Q2H PRN Anabell Bowser MD   4 mg at 11/19/21 0355    ondansetron (ZOFRAN) injection 4 mg  4 mg IntraVENous Q6H PRN Araceli Lal MD   4 mg at 11/15/21 1254    prochlorperazine (COMPAZINE) injection 10 mg  10 mg IntraVENous Q6H PRN MD Elina Reid MD  Pulmonary and Critical Care Medicine;

## 2021-11-20 NOTE — PROGRESS NOTES
[facility]         Admit Date: 11/15/2021  7:00 AM  Pt Name: Gaye Wang  MRN: [id number]  : 1970  Reason for Consult:  No chief complaint on file. Requesting Physician:  Deb Guillen MD  PCP: Ameena Dalton MD  History Obtained From:  patient, chart   ID consulted for S/P gastric bypass [Z98.84]  on hospital day 5  CHIEF COMPLAINT     No chief complaint on file. HISTORYOF PRESENT ILLNESS   Barb Basilio is a 49 y.o. female who presents with   has a past medical history of Depression, Hyperlipidemia, Hypertension, Morbid (severe) obesity due to excess calories ([address] and Thyroid disease.       HPI  PT WAS ADMITTED FOR GASTRIC BYPASS  PT HAD FEVERS POSTOP  [id number]  PT REQUIRED O2   HAS BEEN ON CEFTRIAXONE/FLAGYL  A COVID SCREEN CAME BACK +    WBC13.3->9.4 CR0.7    DOS  21   PT IN BED ON HER SIDE HAS NO C/O   USING IS  MOTIVATED  NO AND PAIN NO UTI   REVIEW OF SYSTEMS     CONSTITUTIONAL:   No fever, chills, weight loss  ALLERGIES:    No urticaria, hay fever,    EYES:     No blurry vision, loss of vision, eye pain  ENT:      No hearing loss, sore throat  CARDIOVASCULAR:  No chest pain or palpitations  RESPIRATORY:   No cough, sob ON O2  ENDOCRINE:    No increase thirst, urination   HEME-LYMPH:   No easy bruising or bleeding  GI:     No nausea, vomiting or diarrhea  :     No urinary complaints  NEURO:    No seizures, stroke, HA  MUSCULOSKELETAL:  No muscle aches or pain, no joint pain  SKIN:     No rash or itch  PSYCH:    No depression or anxiety    Medications Prior to Admission: omeprazole (PRILOSEC) 20 MG delayed release capsule, Take 1 capsule by mouth daily  levothyroxine (SYNTHROID) 25 MCG tablet, Take 25 mcg by mouth daily  DULoxetine (CYMBALTA) 30 MG extended release capsule, Take 30 mg by mouth every morning (before breakfast)  hydrOXYzine (VISTARIL) 50 MG capsule, Take 50 mg by mouth 3 times daily  ondansetron (ZOFRAN-ODT) 4 MG disintegrating tablet, Place 1 tablet under the tongue every 8 hours as needed for Nausea or Vomiting  B Complex-C (SUPER B COMPLEX PO), Take by mouth daily  LATUDA 60 MG TABS tablet, Take 60 mg by mouth every evening  traZODone (DESYREL) 100 MG tablet, Take 100 tablets by mouth nightly as needed  Calcium Carb-Cholecalciferol (CALCIUM 600 + D PO), Take 1 tablet by mouth daily  Multiple Vitamins-Minerals (ONE-A-DAY FOR HER VITACRAVES PO), Take 1 tablet by mouth daily  DULoxetine (CYMBALTA) 60 MG extended release capsule, Take 60 mg by mouth nightly  [DISCONTINUED] lisinopril (PRINIVIL;ZESTRIL) 5 MG tablet, Take 5 mg by mouth daily  [DISCONTINUED] metFORMIN (GLUCOPHAGE) 500 MG tablet, Take 500 mg by mouth 2 times daily (with meals)   [DISCONTINUED] omeprazole (PRILOSEC) 40 MG delayed release capsule, Take 40 mg by mouth daily  pravastatin (PRAVACHOL) 20 MG tablet, Take 20 mg by mouth daily  cyclobenzaprine (FLEXERIL) 10 MG tablet, Take 10 mg by mouth daily  CURRENT MEDICATIONS     Current Facility-Administered Medications:     apixaban (ELIQUIS) tablet 5 mg, 5 mg, Oral, BID, Badi Altawil, MD, 5 mg at 11/20/21 0817    dexamethasone (DECADRON) tablet 6 mg, 6 mg, Oral, Q24H, Sherry Soler MD, 6 mg at 11/19/21 2055    [COMPLETED] remdesivir 200 mg in sodium chloride 0.9 % 250 mL IVPB, 200 mg, IntraVENous, Once, Stopped at 11/19/21 2342 **FOLLOWED BY** remdesivir 100 mg in sodium chloride 0.9 % 250 mL IVPB, 100 mg, IntraVENous, Q24H, Badi Altawil, MD    0.9 % sodium chloride bolus, 30 mL, IntraVENous, PRN, Badi Altawil, MD    pantoprazole (PROTONIX) tablet 20 mg, 20 mg, Oral, QAM AC, Anabell Bowser MD, 20 mg at 11/20/21 8319    acetaminophen (TYLENOL) suspension 650 mg, 650 mg, Oral, Q6H PRN, Anabell Bowser MD, 650 mg at 11/19/21 1516    cefTRIAXone (ROCEPHIN) 1,000 mg in sterile water 10 mL IV syringe, 1,000 mg, IntraVENous, Q24H, Anabell Bowser MD, 1,000 mg at 11/19/21 1841    metronidazole (FLAGYL) 500 mg in NaCl 100 mL IVPB premix, 500 mg, IntraVENous, Q8H, Christy Haines MD, Stopped at 11/20/21 1138    lisinopril (PRINIVIL;ZESTRIL) tablet 5 mg, 5 mg, Oral, Daily, Badi Altawil, MD, 5 mg at 11/18/21 0910    levothyroxine (SYNTHROID) tablet 25 mcg, 25 mcg, Oral, Daily, Juan Manuel Skinner MD, 25 mcg at 11/20/21 2557    lactated ringers infusion, , IntraVENous, Continuous, Christy Haines MD, Last Rate: 125 mL/hr at 11/20/21 1425, New Bag at 11/20/21 1425    sodium chloride flush 0.9 % injection 5-40 mL, 5-40 mL, IntraVENous, 2 times per day, Christy Haines MD, 5 mL at 11/20/21 0821    sodium chloride flush 0.9 % injection 5-40 mL, 5-40 mL, IntraVENous, PRN, Christy Haines MD    0.9 % sodium chloride infusion, 25 mL, IntraVENous, PRN, Christy Haines MD    morphine (PF) injection 2 mg, 2 mg, IntraVENous, Q2H PRN, 2 mg at 11/20/21 0817 **OR** morphine injection 4 mg, 4 mg, IntraVENous, Q2H PRN, Christy Haines MD, 4 mg at 11/19/21 2342    ondansetron [facility]) injection 4 mg, 4 mg, IntraVENous, Q6H PRN, Christy Haines MD, 4 mg at 11/15/21 1254    prochlorperazine (COMPAZINE) injection 10 mg, 10 mg, IntraVENous, Q6H PRN, Christy Haines MD  ALLERGIES     Patient has no known allergies. There is no immunization history on file for this patient.    Internal Administration   First Dose      Second Dose           Last COVID Lab SARS-CoV-2 (no units)   Date Value   03/08/2021 Not Detected     SARS-CoV-2, PCR (no units)   Date Value   11/18/2021 DETECTED (A)            PAST MEDICAL HISTORY     Past Medical History:   Diagnosis Date    Depression     Hyperlipidemia     Hypertension     Morbid (severe) obesity due to excess calories ([address]     Thyroid disease      SURGICAL HISTORY       Past Surgical History:   Procedure Laterality Date    CARDIAC CATHETERIZATION      CARPAL TUNNEL RELEASE      MARI-EN-Y GASTRIC BYPASS  11/15/2021    MARI-EN-Y GASTRIC BYPASS N/A 11/15/2021    GASTRIC BYPASS MARI-EN-Y LAPAROSCOPIC, HIATAL HERNIA REPAIR performed by Lynsey Bonds MD at [facility] ENDOSCOPY N/A 3/12/2021    EGD BIOPSY performed by Lynsey Bonds MD at [address]       Family History   Problem Relation Age of Onset    Diabetes Mother    ·      PHYSICAL EXAM           Vitals:    11/19/21 1845 11/19/21 2102 11/19/21 2230 11/20/21 0815   BP:   (!) 148/68 (!) 118/58   Pulse:   87 58   Resp:   20 20   Temp: 98.8 °F (37.1 °C)  98.9 °F (37.2 °C) 97.8 °F (36.6 °C)   TempSrc:   Infrared    SpO2: 95% 96% 90% 90%   Weight:       Height:         Physical Exam   Constitutional/General: Alert and oriented, NAD INC BMI   Head: NC/AT  Eyes: PERRL, EOMI  Pulmonary: Lungs DECto auscultation bilaterally. ON 2L  Cardiovascular:  Regular rate and rhythm, no murmurs, gallops, or rubs. Abdomen: Soft, + BS.   distension. Nontender. Extremities: Moves all extremities x 4. Skin: Warm and dry without rash  Neurologic:    No focal deficits  Psych: NFLAT  Affect     DIAGNOSTIC RESULTS   RADIOLOGY:   CT CHEST W CONTRAST    Result Date: 11/17/2021  EXAMINATION: CT OF THE CHEST WITH CONTRAST 11/17/2021 8:32 pm TECHNIQUE: CT of the chest was performed with the administration of intravenous contrast. Multiplanar reformatted images are provided for review. Dose modulation, iterative reconstruction, and/or weight based adjustment of the mA/kV was utilized to reduce the radiation dose to as low as reasonably achievable. COMPARISON: None. HISTORY: ORDERING SYSTEM PROVIDED HISTORY: post op lap rygb tachy TECHNOLOGIST PROVIDED HISTORY: Reason for exam:->post op lap rygb tachy FINDINGS: Mediastinum: There is no abnormal mediastinal or hilar mass seen. Thoracic aorta is normal caliber. Lungs/pleura: There is a small left pleural effusion. There is trace right pleural fluid. There is some left basilar atelectasis.   There is some nonspecific mild ground-glass opacity/infiltrate, mostly in the upper lobes, left greater than right. This is nonspecific. Mild infiltrates from viral pneumonia not excluded. There also linear opacities bilaterally which are likely subsegmental atelectasis. No pneumothorax seen. Upper Abdomen: Images through the upper abdomen demonstrate mild hepatic steatosis. Soft Tissues/Bones: No acute abnormality     1. Small left and trace right pleural fluid. There is adjacent left basilar atelectasis. 2. Mild scattered ground glass opacity/infiltrate in upper lobes, left greater than right. This is nonspecific. Mild infiltrates from viral pneumonia not excluded. CT ABDOMEN PELVIS W IV CONTRAST Additional Contrast? Oral    Result Date: 11/17/2021  EXAMINATION: CT OF THE ABDOMEN AND PELVIS WITH CONTRAST 11/17/2021 8:32 pm TECHNIQUE: CT of the abdomen and pelvis was performed with the administration of intravenous contrast. Multiplanar reformatted images are provided for review. Dose modulation, iterative reconstruction, and/or weight based adjustment of the mA/kV was utilized to reduce the radiation dose to as low as reasonably achievable. COMPARISON: None. HISTORY: ORDERING SYSTEM PROVIDED HISTORY: post op rygb tachy TECHNOLOGIST PROVIDED HISTORY: Reason for exam:->post op rygb tachy Additional Contrast?->Oral FINDINGS: Lower Chest: Small left and trace right pleural effusions. Left basilar atelectasis. Organs: There is mild hepatic steatosis. No focal liver lesion identified. The spleen, pancreas, right adrenal gland and kidneys demonstrate no acute abnormality. There is a 2.4 cm left adrenal gland mass which is most likely a benign adenoma. GI/Bowel: There are no findings of intestinal obstruction. The appendix is normal.  There is nonspecific mild stranding in the right lower quadrant which is of uncertain significance. Patient appears status post gastric bypass surgery. Pelvis:  Bladder is unremarkable in appearance. Anterior to the uterine fundus are fluid filled cystic areas which appear somewhat tubular. Abnormality is in the midline. This could represent right, left or bilateral hydrosalpinx. Ovarian cystic lesion not excluded. Left ovary is visualized separate from this structure. Right ovary is otherwise not seen. There is minimal free fluid in the cul-de-sac. Peritoneum/Retroperitoneum: There are aortoiliac atherosclerotic calcification. There is no abdominal aortic aneurysm. There is no abnormal fluid collection. There is no free intraperitoneal air. Bones/Soft Tissues: No acute abnormality     1. Tubular cystic area in the midline, anterior to the uterine fundus. This could represent right, left or bilateral hydrosalpinges. No ovarian cystic lesion not excluded. Left ovary is visualized separate from the structure. Right ovary is not otherwise seen. 2. Minimal stranding in the right lower quadrant is of uncertain significance. The adjacent appendix has a normal appearance. 3. Mild hepatic steatosis. 4. Small left and trace right pleural effusions. Left basilar atelectasis.      LABS  Recent Labs     11/17/21  1825 11/20/21  0501   WBC 9.4 3.6*   HGB 12.7 12.9   HCT 37.7 38.3   MCV 93.3 91.2    199     Recent Labs     11/20/21  0501      K 3.6      CO2 26   BUN 5*   CREATININE 0.5   GFRAA >60   LABGLOM >60   GLUCOSE 98   PROT 6.0*   LABALBU 3.1*   CALCIUM 8.3*   BILITOT 0.4   ALKPHOS 71   AST 50*   ALT 29     Recent Labs     11/20/21  0501   PROCAL 0.21*     Lab Results   Component Value Date    CRP 7.5 (H) 11/20/2021    CRP 1.0 (H) 10/26/2020     No results found for: SEDRATE  No results found for: [id number]  Lab Results   Component Value Date    COVID19 DETECTED 11/18/2021     COVID-19/CASTILLO-COV2 LABS  Recent Labs     11/20/21  0501   CRP 7.5*   PROCAL 0.21*   FERRITIN 567   *   DDIMER 1564   FIBRINOGEN >700*   INR 1.1   PROTIME 12.9*   AST 50*   ALT 29     Lab Results   Component Value Date    CHOL 217 09/03/2021    TRIG 180 09/03/2021    HDL 44 09/03/2021    LDLCALC 137 09/03/2021    LABVLDL 36 09/03/2021        MICROBIOLOGY:     Cultures :   Lab Results   Component Value Date    BC 24 Hours no growth 11/17/2021     Lab Results   Component Value Date    BLOODCULT2 24 Hours no growth 11/17/2021          FINAL IMPRESSION    Patient is a 49 y.o. female who presented with No chief complaint on file. and admitted for S/P gastric bypass [Z98.84]  COVID + 11/18 ON  2LSmall left and trace right pleural effusions.  Left basilar   atelectasis. LEUKOCYTOSIS  FEVERS       · Remdesivir follow LFT  · Decadron   · Vitamins   · Follow biomarkers  · Encourage proning/bide postioning/deep or rescue breathing/IS to improve oxygenation   · Baseline triglyceride/LIPID PANEL  · DVT prophylaxis/TREATMENT  · SPOKE WITH DR ALTAWIL    Pt may receive a Covid-19 vaccine after 30 days of infection. Imaging and labs were reviewed per medical records and any ID pertinent labs were addressed with the patient. The patient/FAMILY  was educated about the diagnosis, prognosis, indications, risks and benefits of treatment. An opportunity to ask questions was given to the patient/FAMILY and questions were answered. Thank you for involving me in the care of [address]. Please do not hesitate to call for any questions or concerns.          Electronically signed by Jose A Mckeon MD on 11/20/2021 at 3:33 PM

## 2021-11-20 NOTE — CONSULTS
[facility]  Consult Note    [address] [address]  [place], [address]  Phone [phone]   [phone]      Admit Date: 11/15/2021  7:00 AM  Pt Name: Gardenia Pulido  MRN: [id number]  : 1970  Reason for Consult:  No chief complaint on file. Requesting Physician:  Anabell Bowser MD  PCP: Lalita Caceres MD  History Obtained From:  patient, chart   ID consulted for S/P gastric bypass [Z98.84]  on hospital day 4  CHIEF COMPLAINT     No chief complaint on file. HISTORYOF PRESENT ILLNESS   Barb Grayson is a 49 y.o. female who presents with   has a past medical history of Depression, Hyperlipidemia, Hypertension, Morbid (severe) obesity due to excess calories ([address] and Thyroid disease.    ED TRIAGEVITALS  BP: 130/60, Temp: 98.8 °F (37.1 °C), Pulse: 84, Resp: 18, SpO2: 95 %  HPI  PT WAS ADMITTED FOR GASTRIC BYPASS  PT HAD FEVERS POSTOP  [id number]  PT REQUIRED O2   HAS BEEN ON CEFTRIAXONE/FLAGYL  A COVID SCREEN CAME BACK +    WBC13.3->9.4 CR0.7    REVIEW OF SYSTEMS     CONSTITUTIONAL:   No fever, chills, weight loss  ALLERGIES:    No urticaria, hay fever,    EYES:     No blurry vision, loss of vision, eye pain  ENT:      No hearing loss, sore throat  CARDIOVASCULAR:  No chest pain or palpitations  RESPIRATORY:   No cough, sob ON O2  ENDOCRINE:    No increase thirst, urination   HEME-LYMPH:   No easy bruising or bleeding  GI:     No nausea, vomiting or diarrhea  :     No urinary complaints  NEURO:    No seizures, stroke, HA  MUSCULOSKELETAL:  No muscle aches or pain, no joint pain  SKIN:     No rash or itch  PSYCH:    No depression or anxiety    Medications Prior to Admission: omeprazole (PRILOSEC) 20 MG delayed release capsule, Take 1 capsule by mouth daily  levothyroxine (SYNTHROID) 25 MCG tablet, Take 25 mcg by mouth daily  DULoxetine (CYMBALTA) 30 MG extended release capsule, Take 30 mg by mouth every morning (before breakfast)  hydrOXYzine 11/15/2021    MARI-EN-Y GASTRIC BYPASS N/A 11/15/2021    GASTRIC BYPASS MARI-EN-Y LAPAROSCOPIC, HIATAL HERNIA REPAIR performed by Sigrid Ca MD at [address]      UPPER GASTROINTESTINAL ENDOSCOPY N/A 3/12/2021    EGD BIOPSY performed by Sigrid Ca MD at [address]       Family History   Problem Relation Age of Onset    Diabetes Mother      SOCIAL HISTORY       Social History     Socioeconomic History    Marital status:      Spouse name: None    Number of children: 4    Years of education: None    Highest education level: None   Occupational History    None   Tobacco Use    Smoking status: Former Smoker     Packs/day: 0.50     Years: 20.00     Pack years: 10.00     Types: Cigarettes     Start date: 1998     Quit date: 2021     Years since quittin.3    Smokeless tobacco: Never Used   Vaping Use    Vaping Use: Never used   Substance and Sexual Activity    Alcohol use: No    Drug use: No    Sexual activity: Yes     Partners: Male   Other Topics Concern    None   Social History Narrative    None     Social Determinants of Health     Financial Resource Strain:     Difficulty of Paying Living Expenses: Not on file   Food Insecurity:     Worried About Running Out of Food in the Last Year: Not on file    Anais of Food in the Last Year: Not on file   Transportation Needs:     Lack of Transportation (Medical): Not on file    Lack of Transportation (Non-Medical):  Not on file   Physical Activity:     Days of Exercise per Week: Not on file    Minutes of Exercise per Session: Not on file   Stress:     Feeling of Stress : Not on file   Social Connections:     Frequency of Communication with Friends and Family: Not on file    Frequency of Social Gatherings with Friends and Family: Not on file    Attends Buddhism Services: Not on file    Active Member of Clubs or Organizations: Not on file    Attends Club or Organization Meetings: Not on file    Marital Status: Not on file   Intimate Partner Violence:     Fear of Current or Ex-Partner: Not on file    Emotionally Abused: Not on file    Physically Abused: Not on file    Sexually Abused: Not on file   Housing Stability:     Unable to Pay for Housing in the Last Year: Not on file    Number of Angie in the Last Year: Not on file    Unstable Housing in the Last Year: Not on file     · [address]       ED Triage Vitals   BP Temp Temp Source Pulse Resp SpO2 Height Weight   11/15/21 0715 11/15/21 0715 11/15/21 0715 11/15/21 0715 11/15/21 0715 11/15/21 0715 11/15/21 1215 11/15/21 1215   129/77 96.9 °F (36.1 °C) Infrared 114 16 94 % 5' 4\" (1.626 m) 246 lb (111.6 kg)     Vitals:    Vitals:    11/19/21 0930 11/19/21 1115 11/19/21 1500 11/19/21 1845   BP:   130/60    Pulse:   84    Resp:   18    Temp:   100.4 °F (38 °C) 98.8 °F (37.1 °C)   TempSrc:       SpO2: 94% 90% 92% 95%   Weight:       Height:         Physical Exam   Constitutional/General: Alert and oriented, NAD INC BMI   Head: NC/AT  Eyes: PERRL, EOMI  Mouth: Normal mucosa, no thrush   Neck: Supple, full ROM,    Pulmonary: Lungs DECto auscultation bilaterally. Not in respiratory distress  Cardiovascular:  Regular rate and rhythm, no murmurs, gallops, or rubs. Abdomen: Soft, + BS.   distension. Nontender. INCSION DRY NO ERYTEMA SLIGHT BRUISED  Extremities: Moves all extremities x 4. Warm and well perfused  Pulses:  Distal pulses intact  Skin: Warm and dry without rash  Neurologic:    No focal deficits  Psych: NFLAT  Affect     DIAGNOSTIC RESULTS   RADIOLOGY:   CT CHEST W CONTRAST    Result Date: 11/17/2021  EXAMINATION: CT OF THE CHEST WITH CONTRAST 11/17/2021 8:32 pm TECHNIQUE: CT of the chest was performed with the administration of intravenous contrast. Multiplanar reformatted images are provided for review.  Dose modulation, iterative reconstruction, and/or weight based adjustment of the mA/kV was utilized to reduce the radiation dose to as low as reasonably achievable. COMPARISON: None. HISTORY: ORDERING SYSTEM PROVIDED HISTORY: post op lap rygb tachy TECHNOLOGIST PROVIDED HISTORY: Reason for exam:->post op lap rygb tachy FINDINGS: Mediastinum: There is no abnormal mediastinal or hilar mass seen. Thoracic aorta is normal caliber. Lungs/pleura: There is a small left pleural effusion. There is trace right pleural fluid. There is some left basilar atelectasis. There is some nonspecific mild ground-glass opacity/infiltrate, mostly in the upper lobes, left greater than right. This is nonspecific. Mild infiltrates from viral pneumonia not excluded. There also linear opacities bilaterally which are likely subsegmental atelectasis. No pneumothorax seen. Upper Abdomen: Images through the upper abdomen demonstrate mild hepatic steatosis. Soft Tissues/Bones: No acute abnormality     1. Small left and trace right pleural fluid. There is adjacent left basilar atelectasis. 2. Mild scattered ground glass opacity/infiltrate in upper lobes, left greater than right. This is nonspecific. Mild infiltrates from viral pneumonia not excluded. CT ABDOMEN PELVIS W IV CONTRAST Additional Contrast? Oral    Result Date: 11/17/2021  EXAMINATION: CT OF THE ABDOMEN AND PELVIS WITH CONTRAST 11/17/2021 8:32 pm TECHNIQUE: CT of the abdomen and pelvis was performed with the administration of intravenous contrast. Multiplanar reformatted images are provided for review. Dose modulation, iterative reconstruction, and/or weight based adjustment of the mA/kV was utilized to reduce the radiation dose to as low as reasonably achievable. COMPARISON: None. HISTORY: ORDERING SYSTEM PROVIDED HISTORY: post op rygb tachy TECHNOLOGIST PROVIDED HISTORY: Reason for exam:->post op rygb tachy Additional Contrast?->Oral FINDINGS: Lower Chest: Small left and trace right pleural effusions. Left basilar atelectasis. Organs:  There is mild hepatic steatosis. No focal liver lesion identified. The spleen, pancreas, right adrenal gland and kidneys demonstrate no acute abnormality. There is a 2.4 cm left adrenal gland mass which is most likely a benign adenoma. GI/Bowel: There are no findings of intestinal obstruction. The appendix is normal.  There is nonspecific mild stranding in the right lower quadrant which is of uncertain significance. Patient appears status post gastric bypass surgery. Pelvis:  Bladder is unremarkable in appearance. Anterior to the uterine fundus are fluid filled cystic areas which appear somewhat tubular. Abnormality is in the midline. This could represent right, left or bilateral hydrosalpinx. Ovarian cystic lesion not excluded. Left ovary is visualized separate from this structure. Right ovary is otherwise not seen. There is minimal free fluid in the cul-de-sac. Peritoneum/Retroperitoneum: There are aortoiliac atherosclerotic calcification. There is no abdominal aortic aneurysm. There is no abnormal fluid collection. There is no free intraperitoneal air. Bones/Soft Tissues: No acute abnormality     1. Tubular cystic area in the midline, anterior to the uterine fundus. This could represent right, left or bilateral hydrosalpinges. No ovarian cystic lesion not excluded. Left ovary is visualized separate from the structure. Right ovary is not otherwise seen. 2. Minimal stranding in the right lower quadrant is of uncertain significance. The adjacent appendix has a normal appearance. 3. Mild hepatic steatosis. 4. Small left and trace right pleural effusions. Left basilar atelectasis.      LABS  Recent Labs     11/17/21  1143 11/17/21  1825   WBC 9.4 9.4   HGB 13.0 12.7   HCT 39.9 37.7   MCV 94.5 93.3    201     Recent Labs     11/17/21  1143      K 3.6      CO2 28   BUN 6   CREATININE 0.7   GFRAA >60   LABGLOM >60   GLUCOSE 93   PROT 7.1   LABALBU 3.7   CALCIUM 8.9   BILITOT 0.7   ALKPHOS 71 pertinent labs were addressed with the patient. The patient/FAMILY  was educated about the diagnosis, prognosis, indications, risks and benefits of treatment. An opportunity to ask questions was given to the patient/FAMILY and questions were answered. Thank you for involving me in the care of [address] [address]. Please do not hesitate to call for any questions or concerns.          Electronically signed by Karl Jones MD on 11/19/2021 at 9:16 PM

## 2021-11-20 NOTE — PROGRESS NOTES
Subjective: This is a follow-up consultation note on this patient. In summary she was admitted on 11/15/2021, underwent Randy-en-Y gastric bypass surgery by general surgeon Dr. Shanique Trujillo, I saw her as a postop consultant for internal medicine on 11/16/2021, she had no medical issues or any problems at that time, I called for a follow-up telephone call with the nurse manager on the third floor on 11/17/2021 I was told she was fine and will be discharged the same day, I received a call yesterday evening about this patient requesting an order to discharge the patient at the same time I was told she is Covid positive, she is hypoxic requiring 4 L nasal cannula at rest and up to 6 L with activities, just for the records I was not notified about those events during the. Of November 17 to November 19, 2021, I held the discharge and notified charge nurse yesterday she cannot be discharged home without addressing the active Covid. Patient tested negative for Covid 1 week prior to her surgery, according to the patient 1 question today her daughter who is 8years old had positive exposures at school. I reviewed the medical records in details yesterday and today, CT of the abdomen did not show any abnormality but CT of the chest did show bilateral upper lobe infiltrates and groundglass appearance confirming Covid 19 pneumonia. Patient inflammatory markers are elevated, she is still hypoxic requires up to 6 L with ambulation, but 2 L at rest, her D-dimer is elevated, she is admitting dyspnea with moderate activities with occasional cough but no chest pain. ID and pulmonary services are on the case, she is currently on IV Rocephin and Flagyl, oral Decadron 6 mg twice a day, and Eliquis 5 mg twice a day. Patient did spike fever on 11/17/2021 up to 101.5, Blood cultures so far pending without any growth. the patient is awake and alert. No problems overnight. Denies abdominal pain. Tolerating diet. Heart:RRR, no murmurs, no gallops, or rubs. Lungs:diminished in both bases, no wheeze, rales or rhonchi  Abdomen:bowel sounds present, nontender, nondistended, no masses  No clubbing, cyanosis, or edema  No neuro changes     CBC with Differential:    Lab Results   Component Value Date    WBC 3.6 11/20/2021    RBC 4.20 11/20/2021    HGB 12.9 11/20/2021    HCT 38.3 11/20/2021     11/20/2021    MCV 91.2 11/20/2021    MCH 30.7 11/20/2021    MCHC 33.7 11/20/2021    RDW 13.0 11/20/2021    LYMPHOPCT 19.3 11/20/2021    MONOPCT 8.5 11/20/2021    BASOPCT 0.3 11/20/2021    MONOSABS 0.31 11/20/2021    LYMPHSABS 0.70 11/20/2021    EOSABS 0.00 11/20/2021    BASOSABS 0.01 11/20/2021    DIFFTYPE NOT REPORTED 08/08/2013     BMP:    Lab Results   Component Value Date     11/20/2021    K 3.6 11/20/2021     11/20/2021    CO2 26 11/20/2021    BUN 5 11/20/2021    LABALBU 3.1 11/20/2021    CREATININE 0.5 11/20/2021    CALCIUM 8.3 11/20/2021    GFRAA >60 11/20/2021    LABGLOM >60 11/20/2021    GLUCOSE 98 11/20/2021     LDH:    Lab Results   Component Value Date     11/20/2021     PT/INR:    Lab Results   Component Value Date    PROTIME 12.9 11/20/2021    INR 1.1 11/20/2021     FERRITIN:    Lab Results   Component Value Date    FERRITIN 80 03/12/2021     Fibrinogen Level:  No components found for: FIB     Rad:    CT CHEST W CONTRAST   Final Result   1. Small left and trace right pleural fluid. There is adjacent left basilar   atelectasis. 2. Mild scattered ground glass opacity/infiltrate in upper lobes, left   greater than right. This is nonspecific. Mild infiltrates from viral   pneumonia not excluded. CT ABDOMEN PELVIS W IV CONTRAST Additional Contrast? Oral   Final Result   1. Tubular cystic area in the midline, anterior to the uterine fundus. This   could represent right, left or bilateral hydrosalpinges. No ovarian cystic   lesion not excluded.   Left ovary is visualized separate from the structure. Right ovary is not otherwise seen. 2. Minimal stranding in the right lower quadrant is of uncertain   significance. The adjacent appendix has a normal appearance. 3. Mild hepatic steatosis. 4. Small left and trace right pleural effusions. Left basilar atelectasis. Assessment:  A/P:  1) Acute hypoxemic respiratory failure secondary to severe COVID-19 pneumonia  2) Leukocytosis  3) Acute COVID-19 infection  4) status post Randy en Y gastric bypass surgery  5) HTN  6) HLD  7) Severe Obesity  8) GERD  9) Hyperinsulinemia      Plan: Continue IV remdesivir for a total of 3 to 5 days, oxygen therapy and wean it as tolerated, oral Decadron total of 10 days 6 mg twice a day, Eliquis 5 mg twice a day for a total of 4 weeks, keep in isolation, quarantine for up to 14 days, continue IV antibiotics under the discretion of ID service. Patient will be discharged home when she is ready and okay with me and ID service.           Electronically signed by Belia Escobedo MD on 11/20/2021 at 11:53 AM

## 2021-11-20 NOTE — PROGRESS NOTES
Pharmacy Documentation      Consult date: 11/19/21  Physician: Dr. Jerri Mcintosh has been consulted to evaluate criteria for Baricitinib vs Tocilizumab vs Remdesivir therapy based on the [facility] approved Covid-19 treatment algorithm. Based on the algorithm, patient was ordered Remdesivir. Thank you for this consult, please call with any questions.     Kwan Guerrero, [facility]

## 2021-11-20 NOTE — PROGRESS NOTES
PULMONARY MEDICINE CONSULT    Consult requested by: Dr Judith Mueller  Reason for consult: COVID-23 pneumonia    HPI  49year old woman with PMH of morbid obesity, depression, hyperlipidemia, hypertension and hypothyroidism admitted to hospital for bariatric surgery. Her hospital stay has been complicated with [id number] pneumonia and acute hypoxemic respiratory failure. She is being managed with dexamethasone, apixaban, ceftriaxone and metronidazole. 11/20/21: continues to have little complaints other than being \"uncomfortable\". Denies dyspnea except with exertion and even then is very mild and intermittent. Currently only using [facility] which should continue to be titrated. A/P:  1) Acute hypoxemic respiratory failure secondary to severe COVID-19 pneumonia   --Oxygen supplementation to maintain sats > 89%, currently only using 2LNC  --Prone position recommended  --Antimicrobial regimen: Ceftriaxone + Metronidazole  --Antiviral regimen: RDexamethasone 6 mg/day X 10 days, pending inflammatory markers to decide if Tocilizumab/Baricitinib is indicated  --Cultures reviewed  --Inflammatory markers ordered  --Anticoagulation: Apixaban has been ordered by primary attending. Rest of supportive care as per primary team  Hypertension  --On antihypertensives    Hypothyroidism  --Management with thyroid hormone supplements    We will not follow daily. Please call intensivist on call if the patient's condition were to worsen. Thank you for allowing us to participate in the care of Ms Yudith Mendez    Review of Systems  General: denies weight gain, denies loss of appetite, fever, chills, night sweats.   HEENT: denies headaches, dizziness, head trauma, visual changes, eye pain, tinnitus, nosebleeds, hoarseness or throat pain    Respiratory: denies chest pain, positive for mild dyspnea with exertion, negative for cough and hemoptysis  Cardiovascular: denies orthopnea, paroxysmal nocturnal dyspnea, leg swelling, and previous heart attack. Gastrointestinal: denies pain, nausea vomiting, diarrhea, constipation, melena or bleeding. Genitourinary: denies hematuria, frequency, urgency or dysuria  Neurology: denies syncope, seizures, paralysis, paraesthesia   Endocrine: denies polyuria, polydipsia, skin or hair changes, and heat or cold intolerance  Musculoskeletal: denies joint pain, swelling, arthritis or myalgia  Hematologic: denies bleeding, adenopathy and easy bruising  Skin: denies rashes and skin discoloration  Psychiatry: denies depression     Physical Exam  General appearance: obese, not in pain or distress, in no respiratory distress    HEENT: Atraumatic/normocephalic, EOMI, SHANICE, pharynx clear, moist mucosa, redness of the uvula appreciated,   Neck: Supple, no jugular venous distension, lymphadenopathy, thyromegaly or carotid bruits  Chest: Equal but diminished breath sounds, no wheezing, no crackles and no tenderness over ribs   Cardiovascular: Normal S1 , S2, regular rate and rhythm, no murmur, rub or gallop  Abdomen: Normal sounds present, soft, lax with no tenderness, no hepatosplenomegaly, and no masses  Extremities: No edema. Pulses are equally present.    Skin: intact, no rashes   Neurologic: Alert and oriented x 3, No focal deficit     Investigations:  Labs, radiological imaging and cardiac work up were reviewed          SARS-COV-2 biomarkers  Recent Labs     11/17/21  1143   AST 48*   ALT 47*     Lab Results   Component Value Date    CHOL 217 09/03/2021    TRIG 180 09/03/2021    HDL 44 09/03/2021    LDLCALC 137 09/03/2021    LABVLDL 36 09/03/2021     Lab Results   Component Value Date/Time    VITD25 39 03/12/2021 09:04 AM     SARS-COV-2 biomarkers  Recent Labs     11/17/21  1143   AST 48*   ALT 47*     Lab Results   Component Value Date    CHOL 217 09/03/2021    TRIG 180 09/03/2021    HDL 44 09/03/2021    LDLCALC 137 09/03/2021    LABVLDL 36 09/03/2021     Lab Results   Component Value Date/Time    VITD25 39 03/12/2021 09:04 AM       Past Medical History:   Diagnosis Date    Depression     Hyperlipidemia     Hypertension     Morbid (severe) obesity due to excess calories ([address]     Thyroid disease      Family History   Problem Relation Age of Onset    Diabetes Mother      Social History     Socioeconomic History    Marital status:      Spouse name: Not on file    Number of children: 3    Years of education: Not on file    Highest education level: Not on file   Occupational History    Not on file   Tobacco Use    Smoking status: Former Smoker     Packs/day: 0.50     Years: 20.00     Pack years: 10.00     Types: Cigarettes     Start date: 1998     Quit date: 2021     Years since quittin.3    Smokeless tobacco: Never Used   Vaping Use    Vaping Use: Never used   Substance and Sexual Activity    Alcohol use: No    Drug use: No    Sexual activity: Yes     Partners: Male   Other Topics Concern    Not on file   Social History Narrative    Not on file     Social Determinants of Health     Financial Resource Strain:     Difficulty of Paying Living Expenses: Not on file   Food Insecurity:     Worried About [facility] [facility] in the Last Year: Not on file    Anais of Food in the Last Year: Not on file   Transportation Needs:     Lack of Transportation (Medical): Not on file    Lack of Transportation (Non-Medical):  Not on file   Physical Activity:     Days of Exercise per Week: Not on file    Minutes of Exercise per Session: Not on file   Stress:     Feeling of Stress : Not on file   Social Connections:     Frequency of Communication with Friends and Family: Not on file    Frequency of Social Gatherings with Friends and Family: Not on file    Attends Latter-day Services: Not on file    Active Member of Clubs or Organizations: Not on file    Attends Club or Organization Meetings: Not on file    Marital Status: Not on file   Intimate Partner Violence:     Fear of Current or Ex-Partner: Not on file    Emotionally Abused: Not on file    Physically Abused: Not on file    Sexually Abused: Not on file   Housing Stability:     Unable to Pay for Housing in the Last Year: Not on file    Number of Places Lived in the Last Year: Not on file    Unstable Housing in the Last Year: Not on file     Current Facility-Administered Medications   Medication Dose Route Frequency Provider Last Rate Last Admin    apixaban (ELIQUIS) tablet 5 mg  5 mg Oral BID Badi Altawil, MD   5 mg at 11/19/21 2055    dexamethasone (DECADRON) tablet 6 mg  6 mg Oral Q24H Joselito Ty MD   6 mg at 11/19/21 2055    remdesivir 100 mg in sodium chloride 0.9 % 250 mL IVPB  100 mg IntraVENous Q24H Badi Altawil, MD        0.9 % sodium chloride bolus  30 mL IntraVENous PRN Perry Whitehead MD        pantoprazole (PROTONIX) tablet 20 mg  20 mg Oral QAM AC Patrick Chavez MD   20 mg at 11/19/21 0552    acetaminophen (TYLENOL) suspension 650 mg  650 mg Oral Q6H PRN Patrick Chavez MD   650 mg at 11/19/21 1516    cefTRIAXone (ROCEPHIN) 1,000 mg in sterile water 10 mL IV syringe  1,000 mg IntraVENous Q24H Patrick Chavez MD   1,000 mg at 11/19/21 1841    metronidazole (FLAGYL) 500 mg in NaCl 100 mL IVPB premix  500 mg IntraVENous Q8H Patrick Chavez MD   Stopped at 11/19/21 2056    lisinopril (PRINIVIL;ZESTRIL) tablet 5 mg  5 mg Oral Daily Badi Altawil, MD   5 mg at 11/18/21 0910    levothyroxine (SYNTHROID) tablet 25 mcg  25 mcg Oral Daily Perry Whitehead MD   25 mcg at 11/19/21 2902    lactated ringers infusion   IntraVENous Continuous Patrick Chavez  mL/hr at 11/18/21 2257 New Bag at 11/18/21 2257    sodium chloride flush 0.9 % injection 5-40 mL  5-40 mL IntraVENous 2 times per day Patrick Chavez MD   10 mL at 11/19/21 0920    sodium chloride flush 0.9 % injection 5-40 mL  5-40 mL IntraVENous PRN Patrick Chavez MD        0.9 % sodium chloride infusion  25 mL IntraVENous PRN MD Silvio Holland morphine (PF) injection 2 mg  2 mg IntraVENous Q2H PRN Boom Berry MD   2 mg at 11/19/21 1325    Or    morphine injection 4 mg  4 mg IntraVENous Q2H PRN Boom Berry MD   4 mg at 11/19/21 2342    ondansetron (ZOFRAN) injection 4 mg  4 mg IntraVENous Q6H PRN Boom Berry MD   4 mg at 11/15/21 1254    prochlorperazine (COMPAZINE) injection 10 mg  10 mg IntraVENous Q6H PRN Boom Berry MD             Electronically signed by SEGUNDO Fried CNP on 11/20/2021 at [address]:  ICU Staff Physician note of personal involvement in Care  As the attending physician, I certify that I personally reviewed the patients history and personnally examined the patient to confirm the physical findings described above, I saw and evaluated the patient.  I agree with the findings and the plan of care as documented in David LOPEZ's note    And that I reviewed the relevant imaging studies and available reports.  I also discussed the differential diagnosis and all of the proposed management plans with the patient and individuals accompanying the patient to this visit.  They had the opportunity to ask questions about the proposed management plans and to have those questions answered.     This patient has a high probability of sudden, clinically significant deterioration, which requires the highest level of physician preparedness to intervene urgently.  I managed/supervised life or organ supporting interventions that required frequent physician assessment.   I devoted my full attention to the direct care of this patient for the amount of time indicated below.  Time I spent with the family or surrogate(s) is included only if the patient was incapable of providing the necessary information or participating in medical decisions  Time devoted to teaching and to any procedures I billed separately is not included.       Carmel Saldivar MD  Pulmonary and Critical Care Medicine

## 2021-11-21 LAB
ALBUMIN SERPL-MCNC: 3.1 G/DL (ref 3.5–5.2)
ALP BLD-CCNC: 79 U/L (ref 35–104)
ALT SERPL-CCNC: 25 U/L (ref 0–32)
ANION GAP SERPL CALCULATED.3IONS-SCNC: 12 MMOL/L (ref 7–16)
AST SERPL-CCNC: 37 U/L (ref 0–31)
BILIRUB SERPL-MCNC: 0.4 MG/DL (ref 0–1.2)
BUN BLDV-MCNC: 9 MG/DL (ref 6–20)
CALCIUM SERPL-MCNC: 8.5 MG/DL (ref 8.6–10.2)
CHLORIDE BLD-SCNC: 105 MMOL/L (ref 98–107)
CO2: 25 MMOL/L (ref 22–29)
CREAT SERPL-MCNC: 0.5 MG/DL (ref 0.5–1)
GFR AFRICAN AMERICAN: >60
GFR NON-AFRICAN AMERICAN: >60 ML/MIN/1.73
GLUCOSE BLD-MCNC: 130 MG/DL (ref 74–99)
POTASSIUM SERPL-SCNC: 3.8 MMOL/L (ref 3.5–5)
SODIUM BLD-SCNC: 142 MMOL/L (ref 132–146)
TOTAL PROTEIN: 6.1 G/DL (ref 6.4–8.3)

## 2021-11-21 PROCEDURE — 2580000003 HC RX 258: Performed by: INTERNAL MEDICINE

## 2021-11-21 PROCEDURE — 2580000003 HC RX 258: Performed by: SURGERY

## 2021-11-21 PROCEDURE — 2500000003 HC RX 250 WO HCPCS: Performed by: SURGERY

## 2021-11-21 PROCEDURE — 6370000000 HC RX 637 (ALT 250 FOR IP): Performed by: INTERNAL MEDICINE

## 2021-11-21 PROCEDURE — 6360000002 HC RX W HCPCS: Performed by: SURGERY

## 2021-11-21 PROCEDURE — 80053 COMPREHEN METABOLIC PANEL: CPT

## 2021-11-21 PROCEDURE — 2700000000 HC OXYGEN THERAPY PER DAY

## 2021-11-21 PROCEDURE — 36415 COLL VENOUS BLD VENIPUNCTURE: CPT

## 2021-11-21 PROCEDURE — 2500000003 HC RX 250 WO HCPCS: Performed by: INTERNAL MEDICINE

## 2021-11-21 PROCEDURE — 1200000000 HC SEMI PRIVATE

## 2021-11-21 PROCEDURE — 6370000000 HC RX 637 (ALT 250 FOR IP): Performed by: SURGERY

## 2021-11-21 RX ORDER — SODIUM CHLORIDE 450 MG/100ML
INJECTION, SOLUTION INTRAVENOUS CONTINUOUS
Status: DISCONTINUED | OUTPATIENT
Start: 2021-11-21 | End: 2021-11-23

## 2021-11-21 RX ADMIN — REMDESIVIR 100 MG: 100 INJECTION, POWDER, LYOPHILIZED, FOR SOLUTION INTRAVENOUS at 14:55

## 2021-11-21 RX ADMIN — METRONIDAZOLE 500 MG: 500 INJECTION, SOLUTION INTRAVENOUS at 08:53

## 2021-11-21 RX ADMIN — Medication 10 ML: at 21:14

## 2021-11-21 RX ADMIN — PANTOPRAZOLE SODIUM 20 MG: 20 TABLET, DELAYED RELEASE ORAL at 06:23

## 2021-11-21 RX ADMIN — APIXABAN 5 MG: 5 TABLET, FILM COATED ORAL at 08:51

## 2021-11-21 RX ADMIN — SODIUM CHLORIDE: 4.5 INJECTION, SOLUTION INTRAVENOUS at 16:12

## 2021-11-21 RX ADMIN — Medication 5 ML: at 10:47

## 2021-11-21 RX ADMIN — APIXABAN 5 MG: 5 TABLET, FILM COATED ORAL at 21:25

## 2021-11-21 RX ADMIN — METRONIDAZOLE 500 MG: 500 INJECTION, SOLUTION INTRAVENOUS at 18:13

## 2021-11-21 RX ADMIN — DEXAMETHASONE 6 MG: 6 TABLET ORAL at 21:25

## 2021-11-21 RX ADMIN — LISINOPRIL 5 MG: 5 TABLET ORAL at 08:51

## 2021-11-21 RX ADMIN — WATER 1000 MG: 1 INJECTION INTRAMUSCULAR; INTRAVENOUS; SUBCUTANEOUS at 18:13

## 2021-11-21 RX ADMIN — LEVOTHYROXINE SODIUM 25 MCG: 25 TABLET ORAL at 06:23

## 2021-11-21 RX ADMIN — METRONIDAZOLE 500 MG: 500 INJECTION, SOLUTION INTRAVENOUS at 02:13

## 2021-11-21 ASSESSMENT — PAIN DESCRIPTION - LOCATION
LOCATION: ABDOMEN
LOCATION_2: HIP
LOCATION_3: BACK

## 2021-11-21 ASSESSMENT — PAIN SCALES - GENERAL: PAINLEVEL_OUTOF10: 0

## 2021-11-21 ASSESSMENT — PAIN DESCRIPTION - PAIN TYPE
TYPE: SURGICAL PAIN
TYPE_2: ACUTE PAIN
TYPE_3: CHRONIC PAIN

## 2021-11-21 ASSESSMENT — PAIN DESCRIPTION - INTENSITY
RATING_3: 7
RATING_2: 7

## 2021-11-21 NOTE — PROGRESS NOTES
GENERAL SURGERY  DAILY PROGRESS NOTE  11/21/2021    Cc: febrile    Subjective:  No new events overnight per nursing. 3L NC    Objective:  /62   Pulse 73   Temp 97.4 °F (36.3 °C)   Resp 18   Ht 5' 4\" (1.626 m)   Wt 246 lb (111.6 kg)   SpO2 90%   BMI 42.23 kg/m²     General appearance: No acute distress  Eyes: grossly normal  Lungs: nonlabored breathing on 3LNC  Heart: regular 70s  Abdomen: Flat  Neurologic: Alert and oriented x 3. Grossly normal  Musculoskeletal: No clubbing cyanosis    Recent Labs     11/20/21  0501 11/17/21  1825 11/17/21  1143   WBC 3.6* 9.4 9.4   HGB 12.9 12.7 13.0   HCT 38.3 37.7 39.9   MCV 91.2 93.3 94.5    201 199      Lab Results   Component Value Date     11/21/2021    K 3.8 11/21/2021     11/21/2021    CO2 25 11/21/2021    BUN 9 11/21/2021    CREATININE 0.5 11/21/2021    GLUCOSE 130 11/21/2021    CALCIUM 8.5 11/21/2021         Assessment/Plan:  49 y.o. female POD6 s/p lap andreea en y gastric bypass and hiatal hernia repair.  Postop hypoxia due to COVID-19 pneumonia    bariatric liquids  Infectious disease following for treatment of COVID-19 infection  Pulmonology also following  No acute surgical issues  Discharge when okay with primary care provider, infectious disease, pulmonology    Electronically signed by Padilla Stroud MD on 11/21/2021 at 10:12 AM

## 2021-11-21 NOTE — PROGRESS NOTES
[facility]         Admit Date: 11/15/2021  7:00 AM  Pt Name: Maddie Edward  MRN: [id number]  : 1970  Reason for Consult:  No chief complaint on file. Requesting Physician:  Curtis Rodriguez MD  PCP: Mamadou Simpson MD  History Obtained From:  patient, chart   ID consulted for S/P gastric bypass [Z98.84]  on hospital day 6  CHIEF COMPLAINT     No chief complaint on file. HISTORYOF PRESENT ILLNESS   Barb Cueto is a 49 y.o. female who presents with   has a past medical history of Depression, Hyperlipidemia, Hypertension, Morbid (severe) obesity due to excess calories ([address] and Thyroid disease.       HPI  PT WAS ADMITTED FOR GASTRIC BYPASS  PT HAD FEVERS POSTOP  [id number]  PT REQUIRED O2   HAS BEEN ON CEFTRIAXONE/FLAGYL  A COVID SCREEN CAME BACK +    WBC13.3->9.4 CR0.7    DOS  21   PT IN BED ON HER SIDE HAS NO C/O   USING IS  MOTIVATED  NO AND PAIN NO UTI   REVIEW OF SYSTEMS     CONSTITUTIONAL:   No fever, chills, weight loss  ALLERGIES:    No urticaria, hay fever,    EYES:     No blurry vision, loss of vision, eye pain  ENT:      No hearing loss, sore throat  CARDIOVASCULAR:  No chest pain or palpitations  RESPIRATORY:   No cough, sob ON O2  ENDOCRINE:    No increase thirst, urination   HEME-LYMPH:   No easy bruising or bleeding  GI:     No nausea, vomiting or diarrhea  :     No urinary complaints  NEURO:    No seizures, stroke, HA  MUSCULOSKELETAL:  No muscle aches or pain, no joint pain  SKIN:     No rash or itch  PSYCH:    No depression or anxiety    Medications Prior to Admission: omeprazole (PRILOSEC) 20 MG delayed release capsule, Take 1 capsule by mouth daily  levothyroxine (SYNTHROID) 25 MCG tablet, Take 25 mcg by mouth daily  DULoxetine (CYMBALTA) 30 MG extended release capsule, Take 30 mg by mouth every morning (before breakfast)  hydrOXYzine (VISTARIL) 50 MG capsule, Take 50 mg by mouth 3 times daily  ondansetron (ZOFRAN-ODT) 4 MG disintegrating cefTRIAXone (ROCEPHIN) 1,000 mg in sterile water 10 mL IV syringe, 1,000 mg, IntraVENous, Q24H, Senthil Newman MD, 1,000 mg at 11/20/21 1807    metronidazole (FLAGYL) 500 mg in NaCl 100 mL IVPB premix, 500 mg, IntraVENous, Q8H, Senthil Newman MD, Stopped at 11/21/21 0954    lisinopril (PRINIVIL;ZESTRIL) tablet 5 mg, 5 mg, Oral, Daily, Badi Altawil, MD, 5 mg at 11/21/21 0851    levothyroxine (SYNTHROID) tablet 25 mcg, 25 mcg, Oral, Daily, Badi Altawil, MD, 25 mcg at 11/21/21 3955    sodium chloride flush 0.9 % injection 5-40 mL, 5-40 mL, IntraVENous, 2 times per day, Senthil Newman MD, 5 mL at 11/21/21 1047    sodium chloride flush 0.9 % injection 5-40 mL, 5-40 mL, IntraVENous, PRN, Senthil Newman MD    0.9 % sodium chloride infusion, 25 mL, IntraVENous, PRN, Senthil Newman MD    morphine (PF) injection 2 mg, 2 mg, IntraVENous, Q2H PRN, 2 mg at 11/20/21 1807 **OR** morphine injection 4 mg, 4 mg, IntraVENous, Q2H PRN, Senthil Newman MD, 4 mg at 11/20/21 2221    ondansetron (ZOFRAN) injection 4 mg, 4 mg, IntraVENous, Q6H PRN, Senthil Newman MD, 4 mg at 11/15/21 1254    prochlorperazine (COMPAZINE) injection 10 mg, 10 mg, IntraVENous, Q6H PRN, Senthil Newman MD  ALLERGIES     Patient has no known allergies. There is no immunization history on file for this patient.    Internal Administration   First Dose      Second Dose           Last COVID Lab SARS-CoV-2 (no units)   Date Value   03/08/2021 Not Detected     SARS-CoV-2, PCR (no units)   Date Value   11/18/2021 DETECTED (A)            PAST MEDICAL HISTORY     Past Medical History:   Diagnosis Date    Depression     Hyperlipidemia     Hypertension     Morbid (severe) obesity due to excess calories ([facility] [address]     Thyroid disease      SURGICAL HISTORY       Past Surgical History:   Procedure Laterality Date    CARDIAC CATHETERIZATION      CARPAL TUNNEL RELEASE      MARI-EN-Y GASTRIC BYPASS  11/15/2021    MARI-EN-Y GASTRIC BYPASS N/A 11/15/2021    GASTRIC BYPASS MARI-EN-Y LAPAROSCOPIC, HIATAL HERNIA REPAIR performed by Santo Go MD at [facility] ENDOSCOPY N/A 3/12/2021    EGD BIOPSY performed by Santo Go MD at [address]       Family History   Problem Relation Age of Onset    Diabetes Mother    ·      PHYSICAL EXAM           Vitals:    11/21/21 0846 11/21/21 0930 11/21/21 1415 11/21/21 1445   BP: 138/62   132/62   Pulse: 73   81   Resp: 18   18   Temp: 97.4 °F (36.3 °C)   97.8 °F (36.6 °C)   TempSrc:       SpO2: 90% (!) 86% 92% 90%   Weight:       Height:         Physical Exam   Constitutional/General: IN CHAIR HAS NO CO ON NC  Head: NC/AT  Eyes: PERRL, EOMI  Pulmonary: Lungs DECto auscultation bilaterally. ON 4L  Cardiovascular:  Regular rate and rhythm,   Abdomen: Soft, + BS.   distension. Nontender. Extremities: Moves all extremities x 4. Skin: Warm and dry without rash  Neurologic:    No focal deficits  PIV     DIAGNOSTIC RESULTS   RADIOLOGY:   CT CHEST W CONTRAST    Result Date: 11/17/2021  EXAMINATION: CT OF THE CHEST WITH CONTRAST 11/17/2021 8:32 pm TECHNIQUE: CT of the chest was performed with the administration of intravenous contrast. Multiplanar reformatted images are provided for review. Dose modulation, iterative reconstruction, and/or weight based adjustment of the mA/kV was utilized to reduce the radiation dose to as low as reasonably achievable. COMPARISON: None. HISTORY: ORDERING SYSTEM PROVIDED HISTORY: post op lap rygb tachy TECHNOLOGIST PROVIDED HISTORY: Reason for exam:->post op lap rygb tachy FINDINGS: Mediastinum: There is no abnormal mediastinal or hilar mass seen. Thoracic aorta is normal caliber. Lungs/pleura: There is a small left pleural effusion. There is trace right pleural fluid. There is some left basilar atelectasis.   There is some nonspecific mild ground-glass opacity/infiltrate, mostly in the upper lobes, left greater than right. This is nonspecific. Mild infiltrates from viral pneumonia not excluded. There also linear opacities bilaterally which are likely subsegmental atelectasis. No pneumothorax seen. Upper Abdomen: Images through the upper abdomen demonstrate mild hepatic steatosis. Soft Tissues/Bones: No acute abnormality     1. Small left and trace right pleural fluid. There is adjacent left basilar atelectasis. 2. Mild scattered ground glass opacity/infiltrate in upper lobes, left greater than right. This is nonspecific. Mild infiltrates from viral pneumonia not excluded. CT ABDOMEN PELVIS W IV CONTRAST Additional Contrast? Oral    Result Date: 11/17/2021  EXAMINATION: CT OF THE ABDOMEN AND PELVIS WITH CONTRAST 11/17/2021 8:32 pm TECHNIQUE: CT of the abdomen and pelvis was performed with the administration of intravenous contrast. Multiplanar reformatted images are provided for review. Dose modulation, iterative reconstruction, and/or weight based adjustment of the mA/kV was utilized to reduce the radiation dose to as low as reasonably achievable. COMPARISON: None. HISTORY: ORDERING SYSTEM PROVIDED HISTORY: post op rygb tachy TECHNOLOGIST PROVIDED HISTORY: Reason for exam:->post op rygb tachy Additional Contrast?->Oral FINDINGS: Lower Chest: Small left and trace right pleural effusions. Left basilar atelectasis. Organs: There is mild hepatic steatosis. No focal liver lesion identified. The spleen, pancreas, right adrenal gland and kidneys demonstrate no acute abnormality. There is a 2.4 cm left adrenal gland mass which is most likely a benign adenoma. GI/Bowel: There are no findings of intestinal obstruction. The appendix is normal.  There is nonspecific mild stranding in the right lower quadrant which is of uncertain significance. Patient appears status post gastric bypass surgery. Pelvis:  Bladder is unremarkable in appearance.   Anterior to the uterine fundus are fluid filled cystic areas which appear somewhat tubular. Abnormality is in the midline. This could represent right, left or bilateral hydrosalpinx. Ovarian cystic lesion not excluded. Left ovary is visualized separate from this structure. Right ovary is otherwise not seen. There is minimal free fluid in the cul-de-sac. Peritoneum/Retroperitoneum: There are aortoiliac atherosclerotic calcification. There is no abdominal aortic aneurysm. There is no abnormal fluid collection. There is no free intraperitoneal air. Bones/Soft Tissues: No acute abnormality     1. Tubular cystic area in the midline, anterior to the uterine fundus. This could represent right, left or bilateral hydrosalpinges. No ovarian cystic lesion not excluded. Left ovary is visualized separate from the structure. Right ovary is not otherwise seen. 2. Minimal stranding in the right lower quadrant is of uncertain significance. The adjacent appendix has a normal appearance. 3. Mild hepatic steatosis. 4. Small left and trace right pleural effusions. Left basilar atelectasis. LABS  Recent Labs     11/20/21 0501   WBC 3.6*   HGB 12.9   HCT 38.3   MCV 91.2        Recent Labs     11/20/21 0501 11/20/21 0501 11/21/21  0724     --  142   K 3.6   < > 3.8      < > 105   CO2 26   < > 25   BUN 5*  --  9   CREATININE 0.5  --  0.5   GFRAA >60  --  >60   LABGLOM >60  --  >60   GLUCOSE 98  --  130*   PROT 6.0*  --  6.1*   LABALBU 3.1*  --  3.1*   CALCIUM 8.3*  --  8.5*   BILITOT 0.4  --  0.4   ALKPHOS 71  --  79   AST 50*  --  37*   ALT 29  --  25    < > = values in this interval not displayed.      Recent Labs     11/20/21 0501   PROCAL 0.21*     Lab Results   Component Value Date    CRP 7.5 (H) 11/20/2021    CRP 1.0 (H) 10/26/2020     No results found for: SEDRATE  No results found for: [id number]  Lab Results   Component Value Date    COVID19 DETECTED 11/18/2021     COVID-19/CASTILLO-COV2 LABS  Recent Labs     11/20/21  0501 11/21/21  0724   CRP 7.5*

## 2021-11-21 NOTE — PROGRESS NOTES
Pulse ox was 90% on 3L at rest.  Ambulated patient on 3L. Oxygen saturation was 85% on 3L while ambulating. Oxygen increased.   Recovery pulse ox was 90% on 6L of oxygen while ambulating

## 2021-11-22 ENCOUNTER — APPOINTMENT (OUTPATIENT)
Dept: CT IMAGING | Age: 51
DRG: 403 | End: 2021-11-22
Attending: SURGERY
Payer: COMMERCIAL

## 2021-11-22 LAB
ALBUMIN SERPL-MCNC: 3.5 G/DL (ref 3.5–5.2)
ALP BLD-CCNC: 91 U/L (ref 35–104)
ALT SERPL-CCNC: 32 U/L (ref 0–32)
ANION GAP SERPL CALCULATED.3IONS-SCNC: 17 MMOL/L (ref 7–16)
AST SERPL-CCNC: 49 U/L (ref 0–31)
BILIRUB SERPL-MCNC: 0.6 MG/DL (ref 0–1.2)
BUN BLDV-MCNC: 11 MG/DL (ref 6–20)
CALCIUM SERPL-MCNC: 8.9 MG/DL (ref 8.6–10.2)
CHLORIDE BLD-SCNC: 106 MMOL/L (ref 98–107)
CO2: 21 MMOL/L (ref 22–29)
CREAT SERPL-MCNC: 0.5 MG/DL (ref 0.5–1)
GFR AFRICAN AMERICAN: >60
GFR NON-AFRICAN AMERICAN: >60 ML/MIN/1.73
GLUCOSE BLD-MCNC: 124 MG/DL (ref 74–99)
POTASSIUM SERPL-SCNC: 3.4 MMOL/L (ref 3.5–5)
SODIUM BLD-SCNC: 144 MMOL/L (ref 132–146)
TOTAL PROTEIN: 7 G/DL (ref 6.4–8.3)

## 2021-11-22 PROCEDURE — 6360000002 HC RX W HCPCS: Performed by: SURGERY

## 2021-11-22 PROCEDURE — 6370000000 HC RX 637 (ALT 250 FOR IP): Performed by: STUDENT IN AN ORGANIZED HEALTH CARE EDUCATION/TRAINING PROGRAM

## 2021-11-22 PROCEDURE — 2500000003 HC RX 250 WO HCPCS: Performed by: SURGERY

## 2021-11-22 PROCEDURE — 6370000000 HC RX 637 (ALT 250 FOR IP): Performed by: INTERNAL MEDICINE

## 2021-11-22 PROCEDURE — 2580000003 HC RX 258: Performed by: SURGERY

## 2021-11-22 PROCEDURE — 6370000000 HC RX 637 (ALT 250 FOR IP): Performed by: SURGERY

## 2021-11-22 PROCEDURE — 71275 CT ANGIOGRAPHY CHEST: CPT

## 2021-11-22 PROCEDURE — 1200000000 HC SEMI PRIVATE

## 2021-11-22 PROCEDURE — 6360000004 HC RX CONTRAST MEDICATION: Performed by: RADIOLOGY

## 2021-11-22 PROCEDURE — 36415 COLL VENOUS BLD VENIPUNCTURE: CPT

## 2021-11-22 PROCEDURE — 2700000000 HC OXYGEN THERAPY PER DAY

## 2021-11-22 PROCEDURE — 80053 COMPREHEN METABOLIC PANEL: CPT

## 2021-11-22 PROCEDURE — 2500000003 HC RX 250 WO HCPCS: Performed by: INTERNAL MEDICINE

## 2021-11-22 PROCEDURE — 2580000003 HC RX 258: Performed by: INTERNAL MEDICINE

## 2021-11-22 RX ORDER — HYDROXYZINE PAMOATE 25 MG/1
50 CAPSULE ORAL 3 TIMES DAILY
Status: DISCONTINUED | OUTPATIENT
Start: 2021-11-22 | End: 2021-11-24 | Stop reason: HOSPADM

## 2021-11-22 RX ORDER — TRAZODONE HYDROCHLORIDE 50 MG/1
100 TABLET ORAL NIGHTLY PRN
Status: DISCONTINUED | OUTPATIENT
Start: 2021-11-22 | End: 2021-11-24 | Stop reason: HOSPADM

## 2021-11-22 RX ADMIN — LEVOTHYROXINE SODIUM 25 MCG: 25 TABLET ORAL at 06:10

## 2021-11-22 RX ADMIN — HYDROXYZINE PAMOATE 50 MG: 25 CAPSULE ORAL at 20:55

## 2021-11-22 RX ADMIN — HYDROXYZINE PAMOATE 50 MG: 25 CAPSULE ORAL at 13:40

## 2021-11-22 RX ADMIN — HYDROXYZINE PAMOATE 50 MG: 25 CAPSULE ORAL at 07:42

## 2021-11-22 RX ADMIN — PANTOPRAZOLE SODIUM 20 MG: 20 TABLET, DELAYED RELEASE ORAL at 06:10

## 2021-11-22 RX ADMIN — TRAZODONE HYDROCHLORIDE 100 MG: 50 TABLET ORAL at 20:55

## 2021-11-22 RX ADMIN — APIXABAN 5 MG: 5 TABLET, FILM COATED ORAL at 20:55

## 2021-11-22 RX ADMIN — WATER 1000 MG: 1 INJECTION INTRAMUSCULAR; INTRAVENOUS; SUBCUTANEOUS at 17:48

## 2021-11-22 RX ADMIN — IOPAMIDOL 75 ML: 755 INJECTION, SOLUTION INTRAVENOUS at 11:49

## 2021-11-22 RX ADMIN — APIXABAN 5 MG: 5 TABLET, FILM COATED ORAL at 07:43

## 2021-11-22 RX ADMIN — REMDESIVIR 100 MG: 100 INJECTION, POWDER, LYOPHILIZED, FOR SOLUTION INTRAVENOUS at 16:15

## 2021-11-22 RX ADMIN — LISINOPRIL 5 MG: 5 TABLET ORAL at 07:43

## 2021-11-22 RX ADMIN — METRONIDAZOLE 500 MG: 500 INJECTION, SOLUTION INTRAVENOUS at 01:12

## 2021-11-22 RX ADMIN — PROCHLORPERAZINE EDISYLATE 10 MG: 5 INJECTION INTRAMUSCULAR; INTRAVENOUS at 01:17

## 2021-11-22 RX ADMIN — SODIUM CHLORIDE: 4.5 INJECTION, SOLUTION INTRAVENOUS at 11:13

## 2021-11-22 RX ADMIN — MORPHINE SULFATE 2 MG: 2 INJECTION, SOLUTION INTRAMUSCULAR; INTRAVENOUS at 02:56

## 2021-11-22 RX ADMIN — METRONIDAZOLE 500 MG: 500 INJECTION, SOLUTION INTRAVENOUS at 10:10

## 2021-11-22 RX ADMIN — METRONIDAZOLE 500 MG: 500 INJECTION, SOLUTION INTRAVENOUS at 17:47

## 2021-11-22 RX ADMIN — DEXAMETHASONE 6 MG: 6 TABLET ORAL at 20:55

## 2021-11-22 ASSESSMENT — PAIN SCALES - GENERAL
PAINLEVEL_OUTOF10: 6
PAINLEVEL_OUTOF10: 0
PAINLEVEL_OUTOF10: 6

## 2021-11-22 NOTE — PLAN OF CARE
Problem: Inadequate oral food/beverage intake (NI-2.1)  Goal: Food and/or Nutrient Delivery  Description: Pt is follow [facility] diet/policy regarding ONS  Outcome: Met This Shift

## 2021-11-22 NOTE — PROGRESS NOTES
Subjective: This is a follow-up consultation note on this patient. In summary she was admitted on 11/15/2021, underwent Randy-en-Y gastric bypass surgery by general surgeon Dr. Radha Ortega , I saw her as a postop consultation for internal medicine on 11/16/2021, she had no medical issues or any problems at that time, I called for a follow-up telephone call with the nurse manager on the third floor on 11/17/2021 I was told she was fine and will be discharged the same day, I received a call the evening of 11/19/2021 about this patient requesting an order to discharge the patient at the same time I was told she is Covid positive, hypoxic requiring 4 L nasal cannula at rest and up to 6 L with activities, just for the records I was not notified about those events during the period from November 17 to November 19, 2021, I held the discharge and notified charge nurse back then she cannot be discharged home without addressing the active Covid and the hypoxemia. Patient tested negative for Covid 1 week prior to her surgery, according to the patient her daughter who is 8years old had positive exposure to COVID-19 at school. I reviewed the medical records in details, CT of the abdomen did not show any abnormality but CT of the chest did show bilateral upper lobe infiltrates and groundglass appearance confirming Covid 19 pneumonia. Patient inflammatory markers are elevated, she is still hypoxic and requires up to 6 L with ambulation, nurses had to raise the O2 sat to 5 L at night even at rest, her D-dimer is elevated, she is admitting dyspnea with moderate activities with occasional cough but no chest pain. ID and pulmonary services are on the case, she is still on IV Rocephin and Flagyl, oral Decadron 6 mg once a day, and Eliquis 5 mg twice a day. Patient did spike fever on 11/17/2021 up to 101.5, Blood cultures so far without any growth. the patient is awake and alert. No problems overnight.  Denies abdominal pain. Tolerating bariatric diet. No nausea or vomiting. Case discussed with general surgeon Dr. Jennifer Dunham in details on Saturday.   Patient received Compazine earlier this morning around 3:00 AM, since then she has been shaky, flushed and very anxious and nervous, when I came in to see her this morning nurses were in the room and her vitals were taken and all were stable    Current Facility-Administered Medications: traZODone (DESYREL) tablet 100 mg, 100 mg, Oral, Nightly PRN  hydrOXYzine (VISTARIL) capsule 50 mg, 50 mg, Oral, TID  0.45 % sodium chloride infusion, , IntraVENous, Continuous  apixaban (ELIQUIS) tablet 5 mg, 5 mg, Oral, BID  dexamethasone (DECADRON) tablet 6 mg, 6 mg, Oral, Q24H  [COMPLETED] remdesivir 200 mg in sodium chloride 0.9 % 250 mL IVPB, 200 mg, IntraVENous, Once **FOLLOWED BY** remdesivir 100 mg in sodium chloride 0.9 % 250 mL IVPB, 100 mg, IntraVENous, Q24H  0.9 % sodium chloride bolus, 30 mL, IntraVENous, PRN  pantoprazole (PROTONIX) tablet 20 mg, 20 mg, Oral, QAM AC  acetaminophen (TYLENOL) suspension 650 mg, 650 mg, Oral, Q6H PRN  cefTRIAXone (ROCEPHIN) 1,000 mg in sterile water 10 mL IV syringe, 1,000 mg, IntraVENous, Q24H  metronidazole (FLAGYL) 500 mg in NaCl 100 mL IVPB premix, 500 mg, IntraVENous, Q8H  lisinopril (PRINIVIL;ZESTRIL) tablet 5 mg, 5 mg, Oral, Daily  levothyroxine (SYNTHROID) tablet 25 mcg, 25 mcg, Oral, Daily  sodium chloride flush 0.9 % injection 5-40 mL, 5-40 mL, IntraVENous, 2 times per day  sodium chloride flush 0.9 % injection 5-40 mL, 5-40 mL, IntraVENous, PRN  0.9 % sodium chloride infusion, 25 mL, IntraVENous, PRN  morphine (PF) injection 2 mg, 2 mg, IntraVENous, Q2H PRN **OR** morphine injection 4 mg, 4 mg, IntraVENous, Q2H PRN  ondansetron (ZOFRAN) injection 4 mg, 4 mg, IntraVENous, Q6H PRN  prochlorperazine (COMPAZINE) injection 10 mg, 10 mg, IntraVENous, Q6H PRN    Objective:    /80   Pulse 106   Temp 97 °F (36.1 °C) (Infrared)   Resp 20   Ht 5' 4\" (1.626 m)   Wt 246 lb (111.6 kg)   SpO2 96%   BMI 42.23 kg/m²   In: 4718.7 [I.V.:2692]  Out: -    In: 4718.7   Out: -    Heart:RRR, no murmurs, no gallops, or rubs. Lungs:diminished in both bases, no wheeze, rales or rhonchi  Abdomen:bowel sounds present, nontender, nondistended, no masses  No clubbing, cyanosis, trace of edema both lower extremities  No neuro changes   Skin: Mildly flushed in her face and upper chest and back but no hives or rash noted    CBC with Differential:    Lab Results   Component Value Date    WBC 3.6 11/20/2021    RBC 4.20 11/20/2021    HGB 12.9 11/20/2021    HCT 38.3 11/20/2021     11/20/2021    MCV 91.2 11/20/2021    MCH 30.7 11/20/2021    MCHC 33.7 11/20/2021    RDW 13.0 11/20/2021    LYMPHOPCT 19.3 11/20/2021    MONOPCT 8.5 11/20/2021    BASOPCT 0.3 11/20/2021    MONOSABS 0.31 11/20/2021    LYMPHSABS 0.70 11/20/2021    EOSABS 0.00 11/20/2021    BASOSABS 0.01 11/20/2021    DIFFTYPE NOT REPORTED 08/08/2013     BMP:    Lab Results   Component Value Date     11/21/2021    K 3.8 11/21/2021     11/21/2021    CO2 25 11/21/2021    BUN 9 11/21/2021    LABALBU 3.1 11/21/2021    CREATININE 0.5 11/21/2021    CALCIUM 8.5 11/21/2021    GFRAA >60 11/21/2021    LABGLOM >60 11/21/2021    GLUCOSE 130 11/21/2021     LDH:    Lab Results   Component Value Date     11/20/2021     PT/INR:    Lab Results   Component Value Date    PROTIME 12.9 11/20/2021    INR 1.1 11/20/2021     FERRITIN:    Lab Results   Component Value Date    FERRITIN 567 11/20/2021     Rad:    CT CHEST W CONTRAST   Final Result   1. Small left and trace right pleural fluid. There is adjacent left basilar   atelectasis. 2. Mild scattered ground glass opacity/infiltrate in upper lobes, left   greater than right. This is nonspecific. Mild infiltrates from viral   pneumonia not excluded. CT ABDOMEN PELVIS W IV CONTRAST Additional Contrast? Oral   Final Result   1.  Tubular cystic area in the midline, anterior to the uterine fundus. This   could represent right, left or bilateral hydrosalpinges. No ovarian cystic   lesion not excluded. Left ovary is visualized separate from the structure. Right ovary is not otherwise seen. 2. Minimal stranding in the right lower quadrant is of uncertain   significance. The adjacent appendix has a normal appearance. 3. Mild hepatic steatosis. 4. Small left and trace right pleural effusions. Left basilar atelectasis. Assessment:  1) Acute hypoxemic respiratory failure secondary to severe COVID-19 pneumonia  2) Leukocytosis  3) Acute COVID-19 infection  4) status post Randy en Y gastric bypass surgery  5) HTN  6) HLD  7) Severe Obesity  8) GERD  9) Hyperinsulinemia  10) possible allergic reaction to Compazine      Plan: Continue IV Remdesivir for a total of 5 days if okay with ID since the reaction she had could be also related to remdesivir, oxygen therapy and wean it as tolerated, oral Decadron total of 10 days 6 mg once a day, Eliquis 5 mg twice a day for a total of 4 weeks, keep in isolation, quarantine for up to 14 days, continue IV antibiotics under the discretion of ID service, might consider to stop. Patient will be discharged home when she is ready and okay with me and ID service, hopefully by Tuesday when she receives the fifth dose of IV remdesivir. I will  ordered CTA of the pulmonary arteries if okay with pulmonary service rule out pulmonary embolism, and if that is the case she will need Eliquis for 6 months. We will stop Compazine, okay Vistaril as needed.           Electronically signed by Sherrie Priest MD on 11/22/2021 at 7:48 AM

## 2021-11-22 NOTE — PROGRESS NOTES
Walked patient in the robbins on 4L NC. She maintained 91% I attempted to turn the oxygen down to 2L to  see if she could tolerate this and her oxygenation fell to 87 % and she got very anxious and red and sweaty and started to have an anxiety attack and had to sit down. Her oxygen rebounded immediately back to 91% upon sitting. She admitted to being very anxious and thought she was going to need more oxygen than the 4L. Told her she needed to work through that anxiety .  She agreed to try and be better mentally on the next time she walks

## 2021-11-22 NOTE — PROGRESS NOTES
GENERAL SURGERY  DAILY PROGRESS NOTE  11/22/2021    Cc: febrile    Subjective:  Couldn't sleep last night, increased oxygen requirements overnight     Objective:  /78   Pulse 73   Temp 97 °F (36.1 °C) (Infrared)   Resp 18   Ht 5' 4\" (1.626 m)   Wt 246 lb (111.6 kg)   SpO2 96%   BMI 42.23 kg/m²     General appearance: No acute distress  Eyes: grossly normal  Lungs: nonlabored breathing on 4LNC  Heart: regular   Abdomen: soft, non tender   Neurologic: Alert and oriented x 3. Grossly normal  Musculoskeletal: No clubbing cyanosis    Recent Labs     11/20/21  0501 11/17/21  1825 11/17/21  1143   WBC 3.6* 9.4 9.4   HGB 12.9 12.7 13.0   HCT 38.3 37.7 39.9   MCV 91.2 93.3 94.5    201 199      Lab Results   Component Value Date     11/21/2021    K 3.8 11/21/2021     11/21/2021    CO2 25 11/21/2021    BUN 9 11/21/2021    CREATININE 0.5 11/21/2021    GLUCOSE 130 11/21/2021    CALCIUM 8.5 11/21/2021         Assessment/Plan:  49 y.o. female POD7 s/p lap andreea en y gastric bypass and hiatal hernia repair. Postop hypoxia due to COVID-19 pneumonia    bariatric liquids  Infectious disease following for treatment of COVID-19 infection  Pulmonology also following  No acute surgical issues  Reordered trazadone for sleep   Discharge when okay with primary care provider, infectious disease, pulmonology    Electronically signed by Jaycee Saha DO on 11/22/2021 at 6:30 AM    Over weekend events noted  Seen and examined and as above, primary care now rounding again on patient.  D/c when ok with ID and PCP

## 2021-11-23 LAB
ALBUMIN SERPL-MCNC: 3.6 G/DL (ref 3.5–5.2)
ALP BLD-CCNC: 86 U/L (ref 35–104)
ALT SERPL-CCNC: 36 U/L (ref 0–32)
ANION GAP SERPL CALCULATED.3IONS-SCNC: 18 MMOL/L (ref 7–16)
AST SERPL-CCNC: 73 U/L (ref 0–31)
BASOPHILS ABSOLUTE: 0 E9/L (ref 0–0.2)
BASOPHILS RELATIVE PERCENT: 0 % (ref 0–2)
BILIRUB SERPL-MCNC: 0.8 MG/DL (ref 0–1.2)
BLOOD CULTURE, ROUTINE: NORMAL
BUN BLDV-MCNC: 11 MG/DL (ref 6–20)
C-REACTIVE PROTEIN: 1.4 MG/DL (ref 0–0.4)
CALCIUM SERPL-MCNC: 8.5 MG/DL (ref 8.6–10.2)
CHLORIDE BLD-SCNC: 104 MMOL/L (ref 98–107)
CO2: 23 MMOL/L (ref 22–29)
CREAT SERPL-MCNC: 0.5 MG/DL (ref 0.5–1)
CULTURE, BLOOD 2: NORMAL
D DIMER: 1043 NG/ML DDU
EOSINOPHILS ABSOLUTE: 0 E9/L (ref 0.05–0.5)
EOSINOPHILS RELATIVE PERCENT: 0 % (ref 0–6)
FERRITIN: 743 NG/ML
FIBRINOGEN: 499 MG/DL (ref 225–540)
GFR AFRICAN AMERICAN: >60
GFR NON-AFRICAN AMERICAN: >60 ML/MIN/1.73
GLUCOSE BLD-MCNC: 101 MG/DL (ref 74–99)
HCT VFR BLD CALC: 37.4 % (ref 34–48)
HEMOGLOBIN: 13 G/DL (ref 11.5–15.5)
HOWELL-JOLLY BODIES: ABNORMAL
INR BLD: 1.2
LACTATE DEHYDROGENASE: 570 U/L (ref 135–214)
LACTIC ACID: 1.9 MMOL/L (ref 0.5–2.2)
LYMPHOCYTES ABSOLUTE: 0.96 E9/L (ref 1.5–4)
LYMPHOCYTES RELATIVE PERCENT: 11 % (ref 20–42)
MCH RBC QN AUTO: 30.6 PG (ref 26–35)
MCHC RBC AUTO-ENTMCNC: 34.8 % (ref 32–34.5)
MCV RBC AUTO: 88 FL (ref 80–99.9)
MONOCYTES ABSOLUTE: 0.52 E9/L (ref 0.1–0.95)
MONOCYTES RELATIVE PERCENT: 6 % (ref 2–12)
MYELOCYTE PERCENT: 4 % (ref 0–0)
NEUTROPHILS ABSOLUTE: 7.22 E9/L (ref 1.8–7.3)
NEUTROPHILS RELATIVE PERCENT: 79 % (ref 43–80)
PDW BLD-RTO: 13 FL (ref 11.5–15)
PLATELET # BLD: 367 E9/L (ref 130–450)
PMV BLD AUTO: 9.5 FL (ref 7–12)
POTASSIUM SERPL-SCNC: 3.2 MMOL/L (ref 3.5–5)
PROCALCITONIN: 0.09 NG/ML (ref 0–0.08)
PROTHROMBIN TIME: 14.1 SEC (ref 9.3–12.4)
RBC # BLD: 4.25 E12/L (ref 3.5–5.5)
SEDIMENTATION RATE, ERYTHROCYTE: 32 MM/HR (ref 0–20)
SMUDGE CELLS: ABNORMAL
SODIUM BLD-SCNC: 145 MMOL/L (ref 132–146)
TOTAL CK: 587 U/L (ref 20–180)
TOTAL PROTEIN: 7 G/DL (ref 6.4–8.3)
TROPONIN, HIGH SENSITIVITY: <6 NG/L (ref 0–9)
WBC # BLD: 8.7 E9/L (ref 4.5–11.5)

## 2021-11-23 PROCEDURE — 86140 C-REACTIVE PROTEIN: CPT

## 2021-11-23 PROCEDURE — 83605 ASSAY OF LACTIC ACID: CPT

## 2021-11-23 PROCEDURE — 36415 COLL VENOUS BLD VENIPUNCTURE: CPT

## 2021-11-23 PROCEDURE — XW033E5 INTRODUCTION OF REMDESIVIR ANTI-INFECTIVE INTO PERIPHERAL VEIN, PERCUTANEOUS APPROACH, NEW TECHNOLOGY GROUP 5: ICD-10-PCS | Performed by: INTERNAL MEDICINE

## 2021-11-23 PROCEDURE — 85025 COMPLETE CBC W/AUTO DIFF WBC: CPT

## 2021-11-23 PROCEDURE — 85610 PROTHROMBIN TIME: CPT

## 2021-11-23 PROCEDURE — 85651 RBC SED RATE NONAUTOMATED: CPT

## 2021-11-23 PROCEDURE — 2580000003 HC RX 258: Performed by: SURGERY

## 2021-11-23 PROCEDURE — 6370000000 HC RX 637 (ALT 250 FOR IP): Performed by: INTERNAL MEDICINE

## 2021-11-23 PROCEDURE — 2500000003 HC RX 250 WO HCPCS: Performed by: SURGERY

## 2021-11-23 PROCEDURE — 6370000000 HC RX 637 (ALT 250 FOR IP): Performed by: SURGERY

## 2021-11-23 PROCEDURE — 6360000002 HC RX W HCPCS: Performed by: SURGERY

## 2021-11-23 PROCEDURE — 84484 ASSAY OF TROPONIN QUANT: CPT

## 2021-11-23 PROCEDURE — 85384 FIBRINOGEN ACTIVITY: CPT

## 2021-11-23 PROCEDURE — 82728 ASSAY OF FERRITIN: CPT

## 2021-11-23 PROCEDURE — 83615 LACTATE (LD) (LDH) ENZYME: CPT

## 2021-11-23 PROCEDURE — 2700000000 HC OXYGEN THERAPY PER DAY

## 2021-11-23 PROCEDURE — 80053 COMPREHEN METABOLIC PANEL: CPT

## 2021-11-23 PROCEDURE — 85378 FIBRIN DEGRADE SEMIQUANT: CPT

## 2021-11-23 PROCEDURE — 82550 ASSAY OF CK (CPK): CPT

## 2021-11-23 PROCEDURE — 6370000000 HC RX 637 (ALT 250 FOR IP): Performed by: STUDENT IN AN ORGANIZED HEALTH CARE EDUCATION/TRAINING PROGRAM

## 2021-11-23 PROCEDURE — 84145 PROCALCITONIN (PCT): CPT

## 2021-11-23 PROCEDURE — 1200000000 HC SEMI PRIVATE

## 2021-11-23 PROCEDURE — 2580000003 HC RX 258: Performed by: INTERNAL MEDICINE

## 2021-11-23 PROCEDURE — 2500000003 HC RX 250 WO HCPCS: Performed by: INTERNAL MEDICINE

## 2021-11-23 RX ORDER — POTASSIUM CHLORIDE 20 MEQ/1
40 TABLET, EXTENDED RELEASE ORAL ONCE
Status: DISCONTINUED | OUTPATIENT
Start: 2021-11-23 | End: 2021-11-24 | Stop reason: HOSPADM

## 2021-11-23 RX ORDER — POTASSIUM BICARBONATE 25 MEQ/1
50 TABLET, EFFERVESCENT ORAL ONCE
Status: DISCONTINUED | OUTPATIENT
Start: 2021-11-23 | End: 2021-11-23 | Stop reason: CLARIF

## 2021-11-23 RX ADMIN — METRONIDAZOLE 500 MG: 500 INJECTION, SOLUTION INTRAVENOUS at 09:00

## 2021-11-23 RX ADMIN — WATER 1000 MG: 1 INJECTION INTRAMUSCULAR; INTRAVENOUS; SUBCUTANEOUS at 16:40

## 2021-11-23 RX ADMIN — REMDESIVIR 100 MG: 100 INJECTION, POWDER, LYOPHILIZED, FOR SOLUTION INTRAVENOUS at 10:30

## 2021-11-23 RX ADMIN — SODIUM CHLORIDE, PRESERVATIVE FREE 10 ML: 5 INJECTION INTRAVENOUS at 10:30

## 2021-11-23 RX ADMIN — POTASSIUM BICARBONATE 40 MEQ: 782 TABLET, EFFERVESCENT ORAL at 10:59

## 2021-11-23 RX ADMIN — APIXABAN 5 MG: 5 TABLET, FILM COATED ORAL at 09:10

## 2021-11-23 RX ADMIN — HYDROXYZINE PAMOATE 50 MG: 25 CAPSULE ORAL at 13:44

## 2021-11-23 RX ADMIN — LEVOTHYROXINE SODIUM 25 MCG: 25 TABLET ORAL at 06:21

## 2021-11-23 RX ADMIN — Medication 10 ML: at 21:34

## 2021-11-23 RX ADMIN — DEXAMETHASONE 6 MG: 6 TABLET ORAL at 21:32

## 2021-11-23 RX ADMIN — LISINOPRIL 5 MG: 5 TABLET ORAL at 09:10

## 2021-11-23 RX ADMIN — PANTOPRAZOLE SODIUM 20 MG: 20 TABLET, DELAYED RELEASE ORAL at 06:21

## 2021-11-23 RX ADMIN — HYDROXYZINE PAMOATE 50 MG: 25 CAPSULE ORAL at 09:10

## 2021-11-23 RX ADMIN — HYDROXYZINE PAMOATE 50 MG: 25 CAPSULE ORAL at 21:32

## 2021-11-23 RX ADMIN — Medication 10 ML: at 09:09

## 2021-11-23 RX ADMIN — METRONIDAZOLE 500 MG: 500 INJECTION, SOLUTION INTRAVENOUS at 02:25

## 2021-11-23 RX ADMIN — METRONIDAZOLE 500 MG: 500 INJECTION, SOLUTION INTRAVENOUS at 16:41

## 2021-11-23 RX ADMIN — TRAZODONE HYDROCHLORIDE 100 MG: 50 TABLET ORAL at 21:46

## 2021-11-23 RX ADMIN — APIXABAN 5 MG: 5 TABLET, FILM COATED ORAL at 21:32

## 2021-11-23 RX ADMIN — MORPHINE SULFATE 4 MG: 4 INJECTION, SOLUTION INTRAMUSCULAR; INTRAVENOUS at 21:33

## 2021-11-23 RX ADMIN — MORPHINE SULFATE 4 MG: 4 INJECTION, SOLUTION INTRAMUSCULAR; INTRAVENOUS at 16:40

## 2021-11-23 ASSESSMENT — PAIN SCALES - GENERAL
PAINLEVEL_OUTOF10: 8
PAINLEVEL_OUTOF10: 0
PAINLEVEL_OUTOF10: 8
PAINLEVEL_OUTOF10: 0
PAINLEVEL_OUTOF10: 8

## 2021-11-23 ASSESSMENT — PAIN DESCRIPTION - PAIN TYPE
TYPE: SURGICAL PAIN
TYPE: ACUTE PAIN

## 2021-11-23 ASSESSMENT — PAIN DESCRIPTION - ORIENTATION: ORIENTATION: MID

## 2021-11-23 ASSESSMENT — PAIN DESCRIPTION - LOCATION: LOCATION: ABDOMEN

## 2021-11-23 NOTE — CARE COORDINATION
11/23/2021 1340 CM note: POSITIVE COVID 11/18/21. Spoke with pt on phone regarding transition of care. Pt plans to return home at [place]. [facility] informed of new pox testing. Portable O2 tank here at hospital but home concentrator has not been delivered. [facility] will need notified at [place] for home delivery([phone]). Eliquis teaching initiated and nursing to give pt written eliquis material. Spouse will provide home going transport.  Farhana ALLEN

## 2021-11-23 NOTE — PROGRESS NOTES
Physician Progress Note      Siva Granados  CSN #:                  [id number]  :                       1970  ADMIT DATE:       11/15/2021 7:00 AM  [address] [place] DATE:  RESPONDING  PROVIDER #:        Renato Kamara MD          QUERY TEXT:    Pt admitted for Randy-en-Y Gastric Bypass on 11/15. Pt noted to have   laboratory test positive for COVID 19 on . If possible, please document   in progress notes and discharge summary if COVID was present on admission   (POA): The medical record reflects the following:  Risk Factors: daughter had exposure to COVID  Clinical Indicators:  CT of the chest:Mild scattered ground glass   opacity/infiltrate in upper lobes, left  greater than right. ?This is nonspecific. Mild infiltrates from viral   pneumonia not excluded. CTA : Small left pleural effusion which is   increased in size and development of trace right pleural effusion. ? Previously   seen bilateral ground-glass opacities/infiltrates show progression and are   larger in size.;  COVID+;  IM Note: \"Patient tested negative for   Covid 1 week prior to her surgery, according to the patient 1 question today   her daughter who is 8years old had positive exposures at school\". Treatment includes: CT, Critical care consult, Antiviral regimen,   dexamethasone, ID consult and monitoring. Thank you for your time,    Gwynne Snellen, RN, BSN, CCDS  [phone]  Options provided:  -- Yes, COVID-19 was present at the time of the order to admit to the hospital  -- No, COVID-19 was not present on admission and developed during the   inpatient stay  -- Other - I will add my own diagnosis  -- Disagree - Not applicable / Not valid  -- Disagree - Clinically unable to determine / Unknown  -- Refer to Clinical Documentation Reviewer    PROVIDER RESPONSE TEXT:    COVID-19 was present at the time of inpatient admission order. Query created by:  Joeline Gaucher on 2021 9:32 AM      Electronically signed by:  Stevie Cordoba MD 11/23/2021 9:44 AM

## 2021-11-23 NOTE — PROGRESS NOTES
[facility]         Admit Date: 11/15/2021  7:00 AM  Pt Name: Alcides Gastelum  MRN: [id number]  : 1970  Reason for Consult:  No chief complaint on file. Requesting Physician:  Klarissa Love MD  PCP: Annalise Lara MD  History Obtained From:  patient, chart   ID consulted for S/P gastric bypass [Z98.84]  on hospital day 8  CHIEF COMPLAINT     No chief complaint on file. HISTORYOF PRESENT ILLNESS   Barb Ball is a 49 y.o. female who presents with   has a past medical history of Depression, Hyperlipidemia, Hypertension, Morbid (severe) obesity due to excess calories ([address] and Thyroid disease.       HPI  PT WAS ADMITTED FOR GASTRIC BYPASS  PT HAD FEVERS POSTOP  [id number]  PT REQUIRED O2   HAS BEEN ON CEFTRIAXONE/FLAGYL  A COVID SCREEN CAME BACK +    WBC13.3->9.4 CR0.7    DOS  21   spoke with Dr Riley thorpe nurse  On 4L ambulated required inc o2   Wbc8.7 cr0.5  REVIEW OF SYSTEMS     CONSTITUTIONAL:   No fever, chills, weight loss  ALLERGIES:    No urticaria, hay fever,    EYES:     No blurry vision, loss of vision, eye pain  ENT:      No hearing loss, sore throat  CARDIOVASCULAR:  No chest pain or palpitations  RESPIRATORY:   No cough, sob ON O2  ENDOCRINE:    No increase thirst, urination   HEME-LYMPH:   No easy bruising or bleeding  GI:     No nausea, vomiting or diarrhea  :     No urinary complaints  NEURO:    No seizures, stroke, HA  MUSCULOSKELETAL:  No muscle aches or pain, no joint pain  SKIN:     No rash or itch  PSYCH:    No depression or anxiety    Medications Prior to Admission: omeprazole (PRILOSEC) 20 MG delayed release capsule, Take 1 capsule by mouth daily  levothyroxine (SYNTHROID) 25 MCG tablet, Take 25 mcg by mouth daily  DULoxetine (CYMBALTA) 30 MG extended release capsule, Take 30 mg by mouth every morning (before breakfast)  hydrOXYzine (VISTARIL) 50 MG capsule, Take 50 mg by mouth 3 times daily  ondansetron (ZOFRAN-ODT) 4 MG disintegrating tablet, Place 1 tablet under the tongue every 8 hours as needed for Nausea or Vomiting  B Complex-C (SUPER B COMPLEX PO), Take by mouth daily  LATUDA 60 MG TABS tablet, Take 60 mg by mouth every evening  traZODone (DESYREL) 100 MG tablet, Take 100 tablets by mouth nightly as needed  Calcium Carb-Cholecalciferol (CALCIUM 600 + D PO), Take 1 tablet by mouth daily  Multiple Vitamins-Minerals (ONE-A-DAY FOR HER VITACRAVES PO), Take 1 tablet by mouth daily  DULoxetine (CYMBALTA) 60 MG extended release capsule, Take 60 mg by mouth nightly  [DISCONTINUED] lisinopril (PRINIVIL;ZESTRIL) 5 MG tablet, Take 5 mg by mouth daily  [DISCONTINUED] metFORMIN (GLUCOPHAGE) 500 MG tablet, Take 500 mg by mouth 2 times daily (with meals)   [DISCONTINUED] omeprazole (PRILOSEC) 40 MG delayed release capsule, Take 40 mg by mouth daily  pravastatin (PRAVACHOL) 20 MG tablet, Take 20 mg by mouth daily  cyclobenzaprine (FLEXERIL) 10 MG tablet, Take 10 mg by mouth daily  CURRENT MEDICATIONS     Current Facility-Administered Medications:     potassium chloride (KLOR-CON M) extended release tablet 40 mEq, 40 mEq, Oral, Once, Juan Manuel Skinner MD    traZODone (DESYREL) tablet 100 mg, 100 mg, Oral, Nightly PRN, Preethi Pablo, DO, 100 mg at 11/22/21 2055    hydrOXYzine (VISTARIL) capsule 50 mg, 50 mg, Oral, TID, Preethi Pablo, DO, 50 mg at 11/23/21 1344    apixaban (ELIQUIS) tablet 5 mg, 5 mg, Oral, BID, Badi Altawil, MD, 5 mg at 11/23/21 0910    dexamethasone (DECADRON) tablet 6 mg, 6 mg, Oral, Q24H, Shahnaz Loya MD, 6 mg at 11/22/21 2055    0.9 % sodium chloride bolus, 30 mL, IntraVENous, PRN, Juan Manuel Skinner MD    pantoprazole (PROTONIX) tablet 20 mg, 20 mg, Oral, QAM AC, Christy Haines MD, 20 mg at 11/23/21 5938    acetaminophen (TYLENOL) suspension 650 mg, 650 mg, Oral, Q6H PRN, Christy Haines MD, 650 mg at 11/19/21 1516    cefTRIAXone (ROCEPHIN) 1,000 mg in sterile water 10 mL IV syringe, 1,000 mg, IntraVENous, Q24H, Jessica Lujan MD, 1,000 mg at 11/22/21 1748    metronidazole (FLAGYL) 500 mg in NaCl 100 mL IVPB premix, 500 mg, IntraVENous, Q8H, Jessica Lujan MD, Stopped at 11/23/21 1032    lisinopril (PRINIVIL;ZESTRIL) tablet 5 mg, 5 mg, Oral, Daily, Badi Altawil, MD, 5 mg at 11/23/21 0910    levothyroxine (SYNTHROID) tablet 25 mcg, 25 mcg, Oral, Daily, Badi Altawil, MD, 25 mcg at 11/23/21 0621    sodium chloride flush 0.9 % injection 5-40 mL, 5-40 mL, IntraVENous, 2 times per day, Jessica Lujan MD, 10 mL at 11/23/21 0909    sodium chloride flush 0.9 % injection 5-40 mL, 5-40 mL, IntraVENous, PRN, Jessica Lujan MD, 10 mL at 11/23/21 1030    0.9 % sodium chloride infusion, 25 mL, IntraVENous, PRN, Jessica Lujan MD    morphine (PF) injection 2 mg, 2 mg, IntraVENous, Q2H PRN, 2 mg at 11/22/21 0256 **OR** morphine injection 4 mg, 4 mg, IntraVENous, Q2H PRN, Jessica Lujan MD, 4 mg at 11/20/21 2221    ondansetron [facility]) injection 4 mg, 4 mg, IntraVENous, Q6H PRN, Jessica Lujan MD, 4 mg at 11/15/21 1254  ALLERGIES     Patient has no known allergies. There is no immunization history on file for this patient.    Internal Administration   First Dose      Second Dose           Last COVID Lab SARS-CoV-2 (no units)   Date Value   03/08/2021 Not Detected     SARS-CoV-2, PCR (no units)   Date Value   11/18/2021 DETECTED (A)            PAST MEDICAL HISTORY     Past Medical History:   Diagnosis Date    Depression     Hyperlipidemia     Hypertension     Morbid (severe) obesity due to excess calories ([facility] [address]     Thyroid disease      SURGICAL HISTORY       Past Surgical History:   Procedure Laterality Date    CARDIAC CATHETERIZATION      CARPAL TUNNEL RELEASE      MARI-EN-Y GASTRIC BYPASS  11/15/2021    MARI-EN-Y GASTRIC BYPASS N/A 11/15/2021    GASTRIC BYPASS MARI-EN-Y LAPAROSCOPIC, HIATAL HERNIA REPAIR performed by Jessica Lujan MD at [address] [place] [address] UPPER GASTROINTESTINAL ENDOSCOPY N/A 3/12/2021    EGD BIOPSY performed by Candace Garza MD at [address]       Family History   Problem Relation Age of Onset    Diabetes Mother    ·      PHYSICAL EXAM           Vitals:    11/23/21 0415 11/23/21 0440 11/23/21 0445 11/23/21 0900   BP:    129/65   Pulse:    69   Resp:       Temp:    97.7 °F (36.5 °C)   TempSrc:    Oral   SpO2: 91% (!) 87% 92% 97%   Weight:       Height:         Physical Exam   In bed    The patient has COVID-19 infection. In order to prevent self exposure and cross contamination care will be coordinated with patient's care team. All relevant records and diagnostic tests were reviewed in EMR, including laboratory results and imaging. DIAGNOSTIC RESULTS   RADIOLOGY:   CT CHEST W CONTRAST    Result Date: 11/17/2021  EXAMINATION: CT OF THE CHEST WITH CONTRAST 11/17/2021 8:32 pm TECHNIQUE: CT of the chest was performed with the administration of intravenous contrast. Multiplanar reformatted images are provided for review. Dose modulation, iterative reconstruction, and/or weight based adjustment of the mA/kV was utilized to reduce the radiation dose to as low as reasonably achievable. COMPARISON: None. HISTORY: ORDERING SYSTEM PROVIDED HISTORY: post op lap rygb tachy TECHNOLOGIST PROVIDED HISTORY: Reason for exam:->post op lap rygb tachy FINDINGS: Mediastinum: There is no abnormal mediastinal or hilar mass seen. Thoracic aorta is normal caliber. Lungs/pleura: There is a small left pleural effusion. There is trace right pleural fluid. There is some left basilar atelectasis. There is some nonspecific mild ground-glass opacity/infiltrate, mostly in the upper lobes, left greater than right. This is nonspecific. Mild infiltrates from viral pneumonia not excluded. There also linear opacities bilaterally which are likely subsegmental atelectasis. No pneumothorax seen.  Upper Abdomen: Images through the upper abdomen demonstrate mild hepatic steatosis. Soft Tissues/Bones: No acute abnormality     1. Small left and trace right pleural fluid. There is adjacent left basilar atelectasis. 2. Mild scattered ground glass opacity/infiltrate in upper lobes, left greater than right. This is nonspecific. Mild infiltrates from viral pneumonia not excluded. CT ABDOMEN PELVIS W IV CONTRAST Additional Contrast? Oral    Result Date: 11/17/2021  EXAMINATION: CT OF THE ABDOMEN AND PELVIS WITH CONTRAST 11/17/2021 8:32 pm TECHNIQUE: CT of the abdomen and pelvis was performed with the administration of intravenous contrast. Multiplanar reformatted images are provided for review. Dose modulation, iterative reconstruction, and/or weight based adjustment of the mA/kV was utilized to reduce the radiation dose to as low as reasonably achievable. COMPARISON: None. HISTORY: ORDERING SYSTEM PROVIDED HISTORY: post op rygb tachy TECHNOLOGIST PROVIDED HISTORY: Reason for exam:->post op rygb tachy Additional Contrast?->Oral FINDINGS: Lower Chest: Small left and trace right pleural effusions. Left basilar atelectasis. Organs: There is mild hepatic steatosis. No focal liver lesion identified. The spleen, pancreas, right adrenal gland and kidneys demonstrate no acute abnormality. There is a 2.4 cm left adrenal gland mass which is most likely a benign adenoma. GI/Bowel: There are no findings of intestinal obstruction. The appendix is normal.  There is nonspecific mild stranding in the right lower quadrant which is of uncertain significance. Patient appears status post gastric bypass surgery. Pelvis:  Bladder is unremarkable in appearance. Anterior to the uterine fundus are fluid filled cystic areas which appear somewhat tubular. Abnormality is in the midline. This could represent right, left or bilateral hydrosalpinx. Ovarian cystic lesion not excluded. Left ovary is visualized separate from this structure. Right ovary is otherwise not seen.   There --   --   --  1.4*   PROCAL  --   --   --  0.09*   FERRITIN  --   --   --  743   LDH  --   --   --  570*   DDIMER  --   --  1043  --    FIBRINOGEN  --   --  499  --    INR  --   --  1.2  --    PROTIME  --   --  14.1*  --    AST 37* 49*  --  73*   ALT 25 32  --  36*     Lab Results   Component Value Date    CHOL 217 09/03/2021    TRIG 180 09/03/2021    HDL 44 09/03/2021    LDLCALC 137 09/03/2021    LABVLDL 36 09/03/2021        MICROBIOLOGY:     Cultures :   Lab Results   Component Value Date    BC 5 Days no growth 11/17/2021     Lab Results   Component Value Date    BLOODCULT2 5 Days no growth 11/17/2021          FINAL IMPRESSION    Patient is a 49 y.o. female who presented with No chief complaint on file. and admitted for S/P gastric bypass [Z98.84]  COVID + 11/18 ON  2LSmall left and trace right pleural effusions.  Left basilar   atelectasis. LEUKOCYTOSIS resolved  FEVERS better        · Remdesivir follow LFT can stop ater todat   · Decadron  For 10 days  · Vitamins   · Follow labs  · To cont   proning/bide postioning/deep or rescue breathing/IS to improve oxygenation   · WEAN O2 AS TOLERATD  · DVT prophylaxis/TREATMENT  ·  can d/ c prob 11/24  · Pt med rec    Pt may receive a Covid-19 vaccine after 30 days of infection. Imaging and labs were reviewed per medical records         An opportunity to ask questions was given to the patient/FAMILY and questions were answered. Thank you for involving me in the care of [facility] [address]. Please do not hesitate to call for any questions or concerns.          Electronically signed by Richmond Koenig MD on 11/23/2021 at 1:49 PM

## 2021-11-23 NOTE — PROGRESS NOTES
Pulse ox was93% on 4 liters nasal cannula at rest.  Ambulated patient on 4 liters nasal cannula. Oxygen saturation was 80% on 4 liters nasal cannula while ambulating. Oxygen applied at 6 liters nasal cannula. Recovery pulse ox was 93% on 6 liters of oxygen while ambulating with stopping to recover intermittently. Pulse oxygen levels would intermittently drop to 80-84% with recovery within 30 seconds.  Sisi Solomon RN

## 2021-11-23 NOTE — PROGRESS NOTES
Patient ambulated to bathroom on 4 L oxygen with assistance, O2 Sat dropped to 79%. Had to incease oxygen to 5L nasal cannula.

## 2021-11-23 NOTE — PROGRESS NOTES
GENERAL SURGERY  DAILY PROGRESS NOTE  11/23/2021    Cc: febrile    Subjective:  Much better this morning, slept well     Objective:  /72   Pulse 75   Temp 97.7 °F (36.5 °C) (Oral)   Resp 18   Ht 5' 4\" (1.626 m)   Wt 246 lb (111.6 kg)   SpO2 92%   BMI 42.23 kg/m²     General appearance: No acute distress  Eyes: grossly normal  Lungs: nonlabored breathing on 4LNC  Heart: regular   Abdomen: soft, non tender   Neurologic: Alert and oriented x 3. Grossly normal  Musculoskeletal: No clubbing cyanosis    Recent Labs     11/20/21  0501 11/17/21  1825 11/17/21  1143   WBC 3.6* 9.4 9.4   HGB 12.9 12.7 13.0   HCT 38.3 37.7 39.9   MCV 91.2 93.3 94.5    201 199      Lab Results   Component Value Date     11/22/2021    K 3.4 11/22/2021     11/22/2021    CO2 21 11/22/2021    BUN 11 11/22/2021    CREATININE 0.5 11/22/2021    GLUCOSE 124 11/22/2021    CALCIUM 8.9 11/22/2021         Assessment/Plan:  49 y.o. female POD8 s/p lap andreea en y gastric bypass and hiatal hernia repair.  Postop hypoxia due to COVID-19 pneumonia    bariatric liquids  Infectious disease following for treatment of COVID-19 infection  Pulmonology also following  No acute surgical issues  Per her she is possibly going home today- ok with surgery   Discharge when okay with primary care provider, infectious disease, pulmonology    Electronically signed by Lisha Shelby DO on 11/23/2021 at 7:20 AM      As above

## 2021-11-23 NOTE — PROGRESS NOTES
[facility]         Admit Date: 11/15/2021  7:00 AM  Pt Name: Panda Brown  MRN: [id number]  : 1970  Reason for Consult:  No chief complaint on file. Requesting Physician:  Santo Go MD  PCP: Max De La Fuente MD  History Obtained From:  patient, chart   ID consulted for S/P gastric bypass [Z98.84]  on hospital day 7  CHIEF COMPLAINT     No chief complaint on file. HISTORYOF PRESENT ILLNESS   Barb Martinez is a 49 y.o. female who presents with   has a past medical history of Depression, Hyperlipidemia, Hypertension, Morbid (severe) obesity due to excess calories ([address] and Thyroid disease.       HPI  PT WAS ADMITTED FOR GASTRIC BYPASS  PT HAD FEVERS POSTOP  [id number]  PT REQUIRED O2   HAS BEEN ON CEFTRIAXONE/FLAGYL  A COVID SCREEN CAME BACK +    WBC13.3->9.4 CR0.7    DOS  21   PT IN BED HAS NO C/O  ASKING ABOUT D/C   NO F/C/NV/D/ABD PAIN   REVIEW OF SYSTEMS     CONSTITUTIONAL:   No fever, chills, weight loss  ALLERGIES:    No urticaria, hay fever,    EYES:     No blurry vision, loss of vision, eye pain  ENT:      No hearing loss, sore throat  CARDIOVASCULAR:  No chest pain or palpitations  RESPIRATORY:   No cough, sob ON O2  ENDOCRINE:    No increase thirst, urination   HEME-LYMPH:   No easy bruising or bleeding  GI:     No nausea, vomiting or diarrhea  :     No urinary complaints  NEURO:    No seizures, stroke, HA  MUSCULOSKELETAL:  No muscle aches or pain, no joint pain  SKIN:     No rash or itch  PSYCH:    No depression or anxiety    Medications Prior to Admission: omeprazole (PRILOSEC) 20 MG delayed release capsule, Take 1 capsule by mouth daily  levothyroxine (SYNTHROID) 25 MCG tablet, Take 25 mcg by mouth daily  DULoxetine (CYMBALTA) 30 MG extended release capsule, Take 30 mg by mouth every morning (before breakfast)  hydrOXYzine (VISTARIL) 50 MG capsule, Take 50 mg by mouth 3 times daily  ondansetron (ZOFRAN-ODT) 4 MG disintegrating tablet, Place 1 tablet under the tongue every 8 hours as needed for Nausea or Vomiting  B Complex-C (SUPER B COMPLEX PO), Take by mouth daily  LATUDA 60 MG TABS tablet, Take 60 mg by mouth every evening  traZODone (DESYREL) 100 MG tablet, Take 100 tablets by mouth nightly as needed  Calcium Carb-Cholecalciferol (CALCIUM 600 + D PO), Take 1 tablet by mouth daily  Multiple Vitamins-Minerals (ONE-A-DAY FOR HER VITACRAVES PO), Take 1 tablet by mouth daily  DULoxetine (CYMBALTA) 60 MG extended release capsule, Take 60 mg by mouth nightly  [DISCONTINUED] lisinopril (PRINIVIL;ZESTRIL) 5 MG tablet, Take 5 mg by mouth daily  [DISCONTINUED] metFORMIN (GLUCOPHAGE) 500 MG tablet, Take 500 mg by mouth 2 times daily (with meals)   [DISCONTINUED] omeprazole (PRILOSEC) 40 MG delayed release capsule, Take 40 mg by mouth daily  pravastatin (PRAVACHOL) 20 MG tablet, Take 20 mg by mouth daily  cyclobenzaprine (FLEXERIL) 10 MG tablet, Take 10 mg by mouth daily  CURRENT MEDICATIONS     Current Facility-Administered Medications:     traZODone (DESYREL) tablet 100 mg, 100 mg, Oral, Nightly PRN, Noam Lofty, DO    hydrOXYzine (VISTARIL) capsule 50 mg, 50 mg, Oral, TID, Noam Lofty, DO, 50 mg at 11/22/21 1340    0.45 % sodium chloride infusion, , IntraVENous, Continuous, Lynsey Bonds MD, Last Rate: 60 mL/hr at 11/22/21 1113, New Bag at 11/22/21 1113    apixaban (ELIQUIS) tablet 5 mg, 5 mg, Oral, BID, Badi Altawil, MD, 5 mg at 11/22/21 0743    dexamethasone (DECADRON) tablet 6 mg, 6 mg, Oral, Q24H, Kiley Le MD, 6 mg at 11/21/21 2125    [COMPLETED] remdesivir 200 mg in sodium chloride 0.9 % 250 mL IVPB, 200 mg, IntraVENous, Once, Stopped at 11/19/21 0302 **FOLLOWED BY** remdesivir 100 mg in sodium chloride 0.9 % 250 mL IVPB, 100 mg, IntraVENous, Q24H, Badi Altawil, MD, Stopped at 11/22/21 3629    0.9 % sodium chloride bolus, 30 mL, IntraVENous, PRN, Badi Altawil, MD    pantoprazole (PROTONIX) tablet 20 mg, 20 mg, Oral, QAM AC, Ben Camarillo MD, 20 mg at 11/22/21 0610    acetaminophen (TYLENOL) suspension 650 mg, 650 mg, Oral, Q6H PRN, Ben Camarillo MD, 650 mg at 11/19/21 1516    cefTRIAXone (ROCEPHIN) 1,000 mg in sterile water 10 mL IV syringe, 1,000 mg, IntraVENous, Q24H, Ben Camarillo MD, 1,000 mg at 11/22/21 1748    metronidazole (FLAGYL) 500 mg in NaCl 100 mL IVPB premix, 500 mg, IntraVENous, Q8H, Ben Camarillo MD, Stopped at 11/22/21 1903    lisinopril (PRINIVIL;ZESTRIL) tablet 5 mg, 5 mg, Oral, Daily, Badi Altawil, MD, 5 mg at 11/22/21 0743    levothyroxine (SYNTHROID) tablet 25 mcg, 25 mcg, Oral, Daily, Badi Altawil, MD, 25 mcg at 11/22/21 0610    sodium chloride flush 0.9 % injection 5-40 mL, 5-40 mL, IntraVENous, 2 times per day, Ben Camarillo MD, 10 mL at 11/21/21 2114    sodium chloride flush 0.9 % injection 5-40 mL, 5-40 mL, IntraVENous, PRN, Ben Camarillo MD    0.9 % sodium chloride infusion, 25 mL, IntraVENous, PRN, Ben Camarillo MD    morphine (PF) injection 2 mg, 2 mg, IntraVENous, Q2H PRN, 2 mg at 11/22/21 0256 **OR** morphine injection 4 mg, 4 mg, IntraVENous, Q2H PRN, Ben Camarillo MD, 4 mg at 11/20/21 2221    ondansetron [facility]) injection 4 mg, 4 mg, IntraVENous, Q6H PRN, Ben Camarillo MD, 4 mg at 11/15/21 1254  ALLERGIES     Patient has no known allergies. There is no immunization history on file for this patient.    Internal Administration   First Dose      Second Dose           Last COVID Lab SARS-CoV-2 (no units)   Date Value   03/08/2021 Not Detected     SARS-CoV-2, PCR (no units)   Date Value   11/18/2021 DETECTED (A)            PAST MEDICAL HISTORY     Past Medical History:   Diagnosis Date    Depression     Hyperlipidemia     Hypertension     Morbid (severe) obesity due to excess calories ([facility] [address]     Thyroid disease      SURGICAL HISTORY       Past Surgical History:   Procedure Laterality Date    CARDIAC CATHETERIZATION      CARPAL TUNNEL RELEASE  MARI-EN-Y GASTRIC BYPASS  11/15/2021    MARI-EN-Y GASTRIC BYPASS N/A 11/15/2021    GASTRIC BYPASS MARI-EN-Y LAPAROSCOPIC, HIATAL HERNIA REPAIR performed by Klarissa Love MD at [address] [facility] ENDOSCOPY N/A 3/12/2021    EGD BIOPSY performed by Klarissa Love MD at [address] [email or web address]       Family History   Problem Relation Age of Onset    Diabetes Mother    ·      PHYSICAL EXAM           Vitals:    11/22/21 0400 11/22/21 0730 11/22/21 1400 11/22/21 1930   BP:  135/80  (!) 147/71   Pulse: 73 106  75   Resp: 18 20  18   Temp:  98.4 °F (36.9 °C)  98 °F (36.7 °C)   TempSrc:  Infrared  Infrared   SpO2: 96% 95%  96%   Weight:       Height:   5' 4\" (1.626 m)      Physical Exam   Constitutional/General:  HAS NO CO ON NC INC BMI   Head: NC/AT  Eyes: PERRL, EOMI  Pulmonary: Lungs DECto auscultation bilaterally POST . ON 4L  Cardiovascular:  Regular rate and rhythm,   Abdomen: Soft, + BS.   distension. Nontender. Extremities: Moves all extremities x 4. Skin: Warm and dry    Neurologic:    No focal deficits  PIV     DIAGNOSTIC RESULTS   RADIOLOGY:   CT CHEST W CONTRAST    Result Date: 11/17/2021  EXAMINATION: CT OF THE CHEST WITH CONTRAST 11/17/2021 8:32 pm TECHNIQUE: CT of the chest was performed with the administration of intravenous contrast. Multiplanar reformatted images are provided for review. Dose modulation, iterative reconstruction, and/or weight based adjustment of the mA/kV was utilized to reduce the radiation dose to as low as reasonably achievable. COMPARISON: None. HISTORY: ORDERING SYSTEM PROVIDED HISTORY: post op lap rygb tachy TECHNOLOGIST PROVIDED HISTORY: Reason for exam:->post op lap rygb tachy FINDINGS: Mediastinum: There is no abnormal mediastinal or hilar mass seen. Thoracic aorta is normal caliber. Lungs/pleura: There is a small left pleural effusion. There is trace right pleural fluid.   There is some left basilar atelectasis. There is some nonspecific mild ground-glass opacity/infiltrate, mostly in the upper lobes, left greater than right. This is nonspecific. Mild infiltrates from viral pneumonia not excluded. There also linear opacities bilaterally which are likely subsegmental atelectasis. No pneumothorax seen. Upper Abdomen: Images through the upper abdomen demonstrate mild hepatic steatosis. Soft Tissues/Bones: No acute abnormality     1. Small left and trace right pleural fluid. There is adjacent left basilar atelectasis. 2. Mild scattered ground glass opacity/infiltrate in upper lobes, left greater than right. This is nonspecific. Mild infiltrates from viral pneumonia not excluded. CT ABDOMEN PELVIS W IV CONTRAST Additional Contrast? Oral    Result Date: 11/17/2021  EXAMINATION: CT OF THE ABDOMEN AND PELVIS WITH CONTRAST 11/17/2021 8:32 pm TECHNIQUE: CT of the abdomen and pelvis was performed with the administration of intravenous contrast. Multiplanar reformatted images are provided for review. Dose modulation, iterative reconstruction, and/or weight based adjustment of the mA/kV was utilized to reduce the radiation dose to as low as reasonably achievable. COMPARISON: None. HISTORY: ORDERING SYSTEM PROVIDED HISTORY: post op rygb tachy TECHNOLOGIST PROVIDED HISTORY: Reason for exam:->post op rygb tachy Additional Contrast?->Oral FINDINGS: Lower Chest: Small left and trace right pleural effusions. Left basilar atelectasis. Organs: There is mild hepatic steatosis. No focal liver lesion identified. The spleen, pancreas, right adrenal gland and kidneys demonstrate no acute abnormality. There is a 2.4 cm left adrenal gland mass which is most likely a benign adenoma. GI/Bowel: There are no findings of intestinal obstruction. The appendix is normal.  There is nonspecific mild stranding in the right lower quadrant which is of uncertain significance. Patient appears status post gastric bypass surgery. Pelvis:  Bladder is unremarkable in appearance. Anterior to the uterine fundus are fluid filled cystic areas which appear somewhat tubular. Abnormality is in the midline. This could represent right, left or bilateral hydrosalpinx. Ovarian cystic lesion not excluded. Left ovary is visualized separate from this structure. Right ovary is otherwise not seen. There is minimal free fluid in the cul-de-sac. Peritoneum/Retroperitoneum: There are aortoiliac atherosclerotic calcification. There is no abdominal aortic aneurysm. There is no abnormal fluid collection. There is no free intraperitoneal air. Bones/Soft Tissues: No acute abnormality     1. Tubular cystic area in the midline, anterior to the uterine fundus. This could represent right, left or bilateral hydrosalpinges. No ovarian cystic lesion not excluded. Left ovary is visualized separate from the structure. Right ovary is not otherwise seen. 2. Minimal stranding in the right lower quadrant is of uncertain significance. The adjacent appendix has a normal appearance. 3. Mild hepatic steatosis. 4. Small left and trace right pleural effusions. Left basilar atelectasis. LABS  Recent Labs     11/20/21  0501   WBC 3.6*   HGB 12.9   HCT 38.3   MCV 91.2        Recent Labs     11/20/21  0501 11/20/21  0501 11/21/21  0724 11/21/21  0724 11/22/21  0736     --  142  --  144   K 3.6   < > 3.8   < > 3.4*      < > 105   < > 106   CO2 26   < > 25   < > 21*   BUN 5*  --  9  --  11   CREATININE 0.5  --  0.5  --  0.5   GFRAA >60  --  >60  --  >60   LABGLOM >60  --  >60  --  >60   GLUCOSE 98  --  130*  --  124*   PROT 6.0*  --  6.1*  --  7.0   LABALBU 3.1*  --  3.1*  --  3.5   CALCIUM 8.3*  --  8.5*  --  8.9   BILITOT 0.4  --  0.4  --  0.6   ALKPHOS 71  --  79  --  91   AST 50*  --  37*  --  49*   ALT 29  --  25  --  32    < > = values in this interval not displayed.      Recent Labs     11/20/21  0501   PROCAL 0.21*     Lab Results   Component Value Date    CRP 7.5 (H) 11/20/2021    CRP 1.0 (H) 10/26/2020     No results found for: SEDRATE  No results found for: [id number]  Lab Results   Component Value Date    COVID19 DETECTED 11/18/2021     COVID-19/CASTILLO-COV2 LABS  Recent Labs     11/20/21  0501 11/21/21  0724 11/22/21  0736   CRP 7.5*  --   --    PROCAL 0.21*  --   --    FERRITIN 567  --   --    *  --   --    DDIMER 1564  --   --    FIBRINOGEN >700*  --   --    INR 1.1  --   --    PROTIME 12.9*  --   --    AST 50* 37* 49*   ALT 29 25 32     Lab Results   Component Value Date    CHOL 217 09/03/2021    TRIG 180 09/03/2021    HDL 44 09/03/2021    LDLCALC 137 09/03/2021    LABVLDL 36 09/03/2021        MICROBIOLOGY:     Cultures :   Lab Results   Component Value Date    BC 24 Hours no growth 11/17/2021     Lab Results   Component Value Date    BLOODCULT2 24 Hours no growth 11/17/2021          FINAL IMPRESSION    Patient is a 49 y.o. female who presented with No chief complaint on file. and admitted for S/P gastric bypass [Z98.84]  COVID + 11/18 ON  2LSmall left and trace right pleural effusions.  Left basilar   atelectasis. LEUKOCYTOSIS  FEVERS       · Remdesivir follow LFT  · Decadron   · Vitamins   · Follow biomarkers IN AM  · Encourage proning/bide postioning/deep or rescue breathing/IS to improve oxygenation   · WEAN O2 AS TOLERATD  · DVT prophylaxis/TREATMENT  ·      Pt may receive a Covid-19 vaccine after 30 days of infection. Imaging and labs were reviewed per medical records         An opportunity to ask questions was given to the patient/FAMILY and questions were answered. Thank you for involving me in the care of [address]. Please do not hesitate to call for any questions or concerns.          Electronically signed by Stella Brannon MD on 11/22/2021 at 7:51 PM

## 2021-11-23 NOTE — PROGRESS NOTES
Subjective: This is a follow-up consultation note on this patient. In summary she was admitted on 11/15/2021, underwent Randy-en-Y gastric bypass surgery by general surgeon Dr. Anthony Ledezma , I saw her as a postop consultation for internal medicine on 11/16/2021, she had no medical issues or any problems at that time, I called for a follow-up telephone call with the nurse manager on the third floor on 11/17/2021 I was told she was fine and will be discharged the same day, I received a call the evening of 11/19/2021 about this patient requesting an order to discharge the patient at the same time I was told she is Covid positive, hypoxic requiring 4 L nasal cannula at rest and up to 6 L with activities, just for the records I was not notified about those events during the period from November 17 to November 19, 2021, I held the discharge and notified charge nurse back then she cannot be discharged home without addressing the active Covid and the hypoxemia. Patient tested negative for Covid 1 week prior to her surgery, according to the patient her daughter who is 8years old had positive exposure to COVID-19 at school. I reviewed the medical records in details, CT of the abdomen did not show any abnormality but CT of the chest did show bilateral upper lobe infiltrates and groundglass appearance confirming Covid 19 pneumonia. Patient inflammatory markers are elevated, she is still hypoxic and requires up to 6 L with ambulation, nurses had to raise the O2 sat to 5-6 L at night even at rest, her D-dimer is elevated, she is admitting dyspnea with moderate activities with occasional cough but no chest pain. ID and pulmonary services are on the case, she is still on IV Rocephin and Flagyl, oral Decadron 6 mg once a day, and Eliquis 5 mg twice a day. Patient did spike fever on 11/17/2021 up to 101.5, but no more fever since, Blood cultures so far without any growth. the patient is awake and alert.   No problems overnight. Denies abdominal pain. Tolerating bariatric diet. No nausea or vomiting. Case discussed with general surgeon Dr. Radha Ortega in details on Saturday. Patient had no more allergic reaction since we stopped Compazine, she still on remdesivir and today is day #5. She is still very anxious to go home.   Repeat CTA of the pulmonary arteries showed no evidence of PE but larger left pleural effusion and larger in size groundglass opacities and infiltrates    Current Facility-Administered Medications: potassium chloride (KLOR-CON M) extended release tablet 40 mEq, 40 mEq, Oral, Once  traZODone (DESYREL) tablet 100 mg, 100 mg, Oral, Nightly PRN  hydrOXYzine (VISTARIL) capsule 50 mg, 50 mg, Oral, TID  apixaban (ELIQUIS) tablet 5 mg, 5 mg, Oral, BID  dexamethasone (DECADRON) tablet 6 mg, 6 mg, Oral, Q24H  [COMPLETED] remdesivir 200 mg in sodium chloride 0.9 % 250 mL IVPB, 200 mg, IntraVENous, Once **FOLLOWED BY** remdesivir 100 mg in sodium chloride 0.9 % 250 mL IVPB, 100 mg, IntraVENous, Q24H  0.9 % sodium chloride bolus, 30 mL, IntraVENous, PRN  pantoprazole (PROTONIX) tablet 20 mg, 20 mg, Oral, QAM AC  acetaminophen (TYLENOL) suspension 650 mg, 650 mg, Oral, Q6H PRN  cefTRIAXone (ROCEPHIN) 1,000 mg in sterile water 10 mL IV syringe, 1,000 mg, IntraVENous, Q24H  metronidazole (FLAGYL) 500 mg in NaCl 100 mL IVPB premix, 500 mg, IntraVENous, Q8H  lisinopril (PRINIVIL;ZESTRIL) tablet 5 mg, 5 mg, Oral, Daily  levothyroxine (SYNTHROID) tablet 25 mcg, 25 mcg, Oral, Daily  sodium chloride flush 0.9 % injection 5-40 mL, 5-40 mL, IntraVENous, 2 times per day  sodium chloride flush 0.9 % injection 5-40 mL, 5-40 mL, IntraVENous, PRN  0.9 % sodium chloride infusion, 25 mL, IntraVENous, PRN  morphine (PF) injection 2 mg, 2 mg, IntraVENous, Q2H PRN **OR** morphine injection 4 mg, 4 mg, IntraVENous, Q2H PRN  ondansetron (ZOFRAN) injection 4 mg, 4 mg, IntraVENous, Q6H PRN    Objective:    /72   Pulse 75 Temp 97.7 °F (36.5 °C) (Oral)   Resp 18   Ht 5' 4\" (1.626 m)   Wt 246 lb (111.6 kg)   SpO2 92%   BMI 42.23 kg/m²   In: 120 [P.O.:120]  Out: -    In: 120   Out: -    Heart:RRR, no murmurs, no gallops, or rubs. Lungs:diminished in both bases, no wheeze, rales or rhonchi  Abdomen:bowel sounds present, nontender, nondistended, no masses  No clubbing, cyanosis, trace of edema both lower extremities  No neuro changes   Skin: Mildly flushed in her face and upper chest and back but no hives or rash noted    CBC with Differential:    Lab Results   Component Value Date    WBC 3.6 11/20/2021    RBC 4.20 11/20/2021    HGB 12.9 11/20/2021    HCT 38.3 11/20/2021     11/20/2021    MCV 91.2 11/20/2021    MCH 30.7 11/20/2021    MCHC 33.7 11/20/2021    RDW 13.0 11/20/2021    LYMPHOPCT 19.3 11/20/2021    MONOPCT 8.5 11/20/2021    BASOPCT 0.3 11/20/2021    MONOSABS 0.31 11/20/2021    LYMPHSABS 0.70 11/20/2021    EOSABS 0.00 11/20/2021    BASOSABS 0.01 11/20/2021    DIFFTYPE NOT REPORTED 08/08/2013     BMP:    Lab Results   Component Value Date     11/22/2021    K 3.4 11/22/2021     11/22/2021    CO2 21 11/22/2021    BUN 11 11/22/2021    LABALBU 3.5 11/22/2021    CREATININE 0.5 11/22/2021    CALCIUM 8.9 11/22/2021    GFRAA >60 11/22/2021    LABGLOM >60 11/22/2021    GLUCOSE 124 11/22/2021     LDH:    Lab Results   Component Value Date     11/20/2021     PT/INR:    Lab Results   Component Value Date    PROTIME 12.9 11/20/2021    INR 1.1 11/20/2021     FERRITIN:    Lab Results   Component Value Date    FERRITIN 567 11/20/2021     Rad:    CTA PULMONARY W CONTRAST   Final Result   1. Small left pleural effusion which is increased in size and development of   trace right pleural effusion. Previously seen bilateral ground-glass   opacities/infiltrates show progression and are larger in size. 2.  No PE identified. 3.  Nonacute findings, as above. CT CHEST W CONTRAST   Final Result   1.  Small left and trace right pleural fluid. There is adjacent left basilar   atelectasis. 2. Mild scattered ground glass opacity/infiltrate in upper lobes, left   greater than right. This is nonspecific. Mild infiltrates from viral   pneumonia not excluded. CT ABDOMEN PELVIS W IV CONTRAST Additional Contrast? Oral   Final Result   1. Tubular cystic area in the midline, anterior to the uterine fundus. This   could represent right, left or bilateral hydrosalpinges. No ovarian cystic   lesion not excluded. Left ovary is visualized separate from the structure. Right ovary is not otherwise seen. 2. Minimal stranding in the right lower quadrant is of uncertain   significance. The adjacent appendix has a normal appearance. 3. Mild hepatic steatosis. 4. Small left and trace right pleural effusions. Left basilar atelectasis. Assessment:  1) Acute hypoxemic respiratory failure secondary to severe COVID-19 pneumonia  2) Leukocytosis  3) Acute COVID-19 infection  4) status post Randy en Y gastric bypass surgery  5) HTN  6) HLD  7) Severe Obesity  8) GERD  9) Hyperinsulinemia  10) possible allergic reaction to Compazin  11) mild hypokalemia      Plan: Continue and finish last day of IV Remdesivir, oxygen therapy and wean it as tolerated, oral Decadron total of 10 days 6 mg once a day, will need another 5 more days upon discharge, Eliquis 5 mg twice a day for a total of 4 weeks, keep in isolation, quarantine for up to 14 days, continue IV antibiotics under the discretion of ID service, might consider to stop upon discharge without the need for further oral antibiotics. Patient will be discharged home when she is ready and okay with me and ID service, hopefully within the next 24 hours. okay Vistaril as needed. We will replace potassium orally and stop IV fluids.   Continue to encourage ambulation, unfortunately she claims she cannot do pronation due to her gastric bypass surgery          Electronically signed by Jessika Posada MD on 11/23/2021 at 7:57 AM

## 2021-11-24 VITALS
WEIGHT: 246 LBS | OXYGEN SATURATION: 94 % | RESPIRATION RATE: 18 BRPM | DIASTOLIC BLOOD PRESSURE: 84 MMHG | BODY MASS INDEX: 42 KG/M2 | HEART RATE: 78 BPM | SYSTOLIC BLOOD PRESSURE: 142 MMHG | TEMPERATURE: 98.1 F | HEIGHT: 64 IN

## 2021-11-24 PROBLEM — U07.1 COVID-19 VIRUS INFECTION: Status: ACTIVE | Noted: 2021-11-24

## 2021-11-24 PROBLEM — J96.01 ACUTE RESPIRATORY FAILURE WITH HYPOXIA (HCC): Status: ACTIVE | Noted: 2021-11-24

## 2021-11-24 LAB — POTASSIUM SERPL-SCNC: 3.7 MMOL/L (ref 3.5–5)

## 2021-11-24 PROCEDURE — 6370000000 HC RX 637 (ALT 250 FOR IP): Performed by: INTERNAL MEDICINE

## 2021-11-24 PROCEDURE — 2500000003 HC RX 250 WO HCPCS: Performed by: SURGERY

## 2021-11-24 PROCEDURE — 36415 COLL VENOUS BLD VENIPUNCTURE: CPT

## 2021-11-24 PROCEDURE — 84132 ASSAY OF SERUM POTASSIUM: CPT

## 2021-11-24 PROCEDURE — 6370000000 HC RX 637 (ALT 250 FOR IP): Performed by: STUDENT IN AN ORGANIZED HEALTH CARE EDUCATION/TRAINING PROGRAM

## 2021-11-24 PROCEDURE — 6370000000 HC RX 637 (ALT 250 FOR IP): Performed by: SURGERY

## 2021-11-24 PROCEDURE — 2580000003 HC RX 258: Performed by: SURGERY

## 2021-11-24 PROCEDURE — 2700000000 HC OXYGEN THERAPY PER DAY

## 2021-11-24 RX ORDER — LISINOPRIL 5 MG/1
5 TABLET ORAL DAILY
Qty: 30 TABLET | Refills: 5
Start: 2021-11-24 | End: 2021-12-01

## 2021-11-24 RX ORDER — DEXAMETHASONE 6 MG/1
6 TABLET ORAL EVERY 24 HOURS
Qty: 5 TABLET | Refills: 0 | Status: SHIPPED | OUTPATIENT
Start: 2021-11-24 | End: 2021-11-29

## 2021-11-24 RX ADMIN — LISINOPRIL 5 MG: 5 TABLET ORAL at 09:20

## 2021-11-24 RX ADMIN — PANTOPRAZOLE SODIUM 20 MG: 20 TABLET, DELAYED RELEASE ORAL at 05:35

## 2021-11-24 RX ADMIN — APIXABAN 5 MG: 5 TABLET, FILM COATED ORAL at 09:20

## 2021-11-24 RX ADMIN — LEVOTHYROXINE SODIUM 25 MCG: 25 TABLET ORAL at 05:35

## 2021-11-24 RX ADMIN — METRONIDAZOLE 500 MG: 500 INJECTION, SOLUTION INTRAVENOUS at 09:20

## 2021-11-24 RX ADMIN — METRONIDAZOLE 500 MG: 500 INJECTION, SOLUTION INTRAVENOUS at 01:47

## 2021-11-24 RX ADMIN — Medication 10 ML: at 09:21

## 2021-11-24 RX ADMIN — HYDROXYZINE PAMOATE 50 MG: 25 CAPSULE ORAL at 09:20

## 2021-11-24 NOTE — PROGRESS NOTES
[facility]         Admit Date: 11/15/2021  7:00 AM  Pt Name: Bibiana Mckeon  MRN: [id number]  : 1970  Reason for Consult:  No chief complaint on file. Requesting Physician:  Roddy Kaur MD  PCP: Shine Dominguez MD  History Obtained From:  patient, chart   ID consulted for S/P gastric bypass [Z98.84]  on hospital day 9  CHIEF COMPLAINT     No chief complaint on file. HISTORYOF PRESENT ILLNESS   Amy Sherren Filbert is a 49 y.o. female who presents with   has a past medical history of Depression, Hyperlipidemia, Hypertension, Morbid (severe) obesity due to excess calories ([address] and Thyroid disease. HPI  PT WAS ADMITTED FOR GASTRIC BYPASS  PT HAD FEVERS POSTOP  [id number]  PT REQUIRED O2   HAS BEEN ON CEFTRIAXONE/FLAGYL  A COVID SCREEN CAME BACK +    WBC13.3->9.4 CR0.7    DOS  21   Patient is resting in bed- she is doing ok- still requiring oxygen- 4 liters. No nausea, vomiting, no diarrhea.      REVIEW OF SYSTEMS     CONSTITUTIONAL:   No fever, chills, weight loss  ALLERGIES:    No urticaria, hay fever,    EYES:     No blurry vision, loss of vision, eye pain  ENT:      No hearing loss, sore throat  CARDIOVASCULAR:   No chest pain or palpitations  RESPIRATORY:   No cough, sob ON O2  ENDOCRINE:    No increase thirst, urination   HEME-LYMPH:   No easy bruising or bleeding  GI:     No nausea, vomiting or diarrhea  :     No urinary complaints  NEURO:    No seizures, stroke, HA  MUSCULOSKELETAL:  No muscle aches or pain, no joint pain  SKIN:     No rash or itch  PSYCH:    No depression or anxiety    Medications Prior to Admission: omeprazole (PRILOSEC) 20 MG delayed release capsule, Take 1 capsule by mouth daily  levothyroxine (SYNTHROID) 25 MCG tablet, Take 25 mcg by mouth daily  DULoxetine (CYMBALTA) 30 MG extended release capsule, Take 30 mg by mouth every morning (before breakfast)  hydrOXYzine (VISTARIL) 50 MG capsule, Take 50 mg by mouth 3 times daily  ondansetron (ZOFRAN-ODT) 4 MG disintegrating tablet, Place 1 tablet under the tongue every 8 hours as needed for Nausea or Vomiting  B Complex-C (SUPER B COMPLEX PO), Take by mouth daily  LATUDA 60 MG TABS tablet, Take 60 mg by mouth every evening  traZODone (DESYREL) 100 MG tablet, Take 100 tablets by mouth nightly as needed  Calcium Carb-Cholecalciferol (CALCIUM 600 + D PO), Take 1 tablet by mouth daily  Multiple Vitamins-Minerals (ONE-A-DAY FOR HER VITACRAVES PO), Take 1 tablet by mouth daily  DULoxetine (CYMBALTA) 60 MG extended release capsule, Take 60 mg by mouth nightly  [DISCONTINUED] lisinopril (PRINIVIL;ZESTRIL) 5 MG tablet, Take 5 mg by mouth daily  [DISCONTINUED] metFORMIN (GLUCOPHAGE) 500 MG tablet, Take 500 mg by mouth 2 times daily (with meals)   [DISCONTINUED] omeprazole (PRILOSEC) 40 MG delayed release capsule, Take 40 mg by mouth daily  pravastatin (PRAVACHOL) 20 MG tablet, Take 20 mg by mouth daily  cyclobenzaprine (FLEXERIL) 10 MG tablet, Take 10 mg by mouth daily  CURRENT MEDICATIONS     Current Facility-Administered Medications:     potassium chloride (KLOR-CON M) extended release tablet 40 mEq, 40 mEq, Oral, Once, Badi Altawil, MD    traZODone (DESYREL) tablet 100 mg, 100 mg, Oral, Nightly PRN, Robert Cherry DO, 100 mg at 11/23/21 2146    hydrOXYzine (VISTARIL) capsule 50 mg, 50 mg, Oral, TID, Robert Cherry DO, 50 mg at 11/24/21 0920    apixaban (ELIQUIS) tablet 5 mg, 5 mg, Oral, BID, Badi Altawil, MD, 5 mg at 11/24/21 0920    dexamethasone (DECADRON) tablet 6 mg, 6 mg, Oral, Q24H, Kia Bennett MD, 6 mg at 11/23/21 2132    0.9 % sodium chloride bolus, 30 mL, IntraVENous, PRN, Badi Altawil, MD    pantoprazole (PROTONIX) tablet 20 mg, 20 mg, Oral, QAM AC, Mee Unger MD, 20 mg at 11/24/21 0535    acetaminophen (TYLENOL) suspension 650 mg, 650 mg, Oral, Q6H PRN, Mee Unger MD, 650 mg at 11/19/21 1516    cefTRIAXone (ROCEPHIN) 1,000 mg in sterile water 10 mL IV syringe, 1,000 mg, IntraVENous, Q24H, Curtis Rodriguez MD, 1,000 mg at 11/23/21 1640    metronidazole (FLAGYL) 500 mg in NaCl 100 mL IVPB premix, 500 mg, IntraVENous, Q8H, Curtis Rodriguez MD, Last Rate: 100 mL/hr at 11/24/21 0920, 500 mg at 11/24/21 0920    lisinopril (PRINIVIL;ZESTRIL) tablet 5 mg, 5 mg, Oral, Daily, Badi Altawil, MD, 5 mg at 11/24/21 0920    levothyroxine (SYNTHROID) tablet 25 mcg, 25 mcg, Oral, Daily, Badi Altawil, MD, 25 mcg at 11/24/21 0535    sodium chloride flush 0.9 % injection 5-40 mL, 5-40 mL, IntraVENous, 2 times per day, Curtis Rodriguez MD, 10 mL at 11/24/21 0921    sodium chloride flush 0.9 % injection 5-40 mL, 5-40 mL, IntraVENous, PRN, Curtis Rodriguez MD, 10 mL at 11/23/21 1030    0.9 % sodium chloride infusion, 25 mL, IntraVENous, PRN, Curtis Rodriguez MD    morphine (PF) injection 2 mg, 2 mg, IntraVENous, Q2H PRN, 2 mg at 11/22/21 0256 **OR** morphine injection 4 mg, 4 mg, IntraVENous, Q2H PRN, Curtis Rodriguez MD, 4 mg at 11/23/21 2133    ondansetron [facility]) injection 4 mg, 4 mg, IntraVENous, Q6H PRN, Curtis Rodriguez MD, 4 mg at 11/15/21 1254  ALLERGIES     Patient has no known allergies. There is no immunization history on file for this patient.    Internal Administration   First Dose      Second Dose           Last COVID Lab SARS-CoV-2 (no units)   Date Value   03/08/2021 Not Detected     SARS-CoV-2, PCR (no units)   Date Value   11/18/2021 DETECTED (A)        PAST MEDICAL HISTORY     Past Medical History:   Diagnosis Date    Depression     Hyperlipidemia     Hypertension     Morbid (severe) obesity due to excess calories ([facility] [address]     Thyroid disease      SURGICAL HISTORY       Past Surgical History:   Procedure Laterality Date    CARDIAC CATHETERIZATION      CARPAL TUNNEL RELEASE      MARI-EN-Y GASTRIC BYPASS  11/15/2021    MARI-EN-Y GASTRIC BYPASS N/A 11/15/2021    GASTRIC BYPASS MARI-EN-Y LAPAROSCOPIC, HIATAL HERNIA REPAIR performed by Curtis Rodriguez MD at [address] structure. Right ovary is otherwise not seen. There is minimal free fluid in the cul-de-sac. Peritoneum/Retroperitoneum: There are aortoiliac atherosclerotic calcification. There is no abdominal aortic aneurysm. There is no abnormal fluid collection. There is no free intraperitoneal air. Bones/Soft Tissues: No acute abnormality     1. Tubular cystic area in the midline, anterior to the uterine fundus. This could represent right, left or bilateral hydrosalpinges. No ovarian cystic lesion not excluded. Left ovary is visualized separate from the structure. Right ovary is not otherwise seen. 2. Minimal stranding in the right lower quadrant is of uncertain significance. The adjacent appendix has a normal appearance. 3. Mild hepatic steatosis. 4. Small left and trace right pleural effusions. Left basilar atelectasis. LABS  Recent Labs     11/23/21  0837   WBC 8.7   HGB 13.0   HCT 37.4   MCV 88.0        Recent Labs     11/22/21  0736 11/22/21  0736 11/23/21  0837 11/23/21  0837 11/24/21  1008     --  145  --   --    K 3.4*   < > 3.2*   < > 3.7      < > 104  --   --    CO2 21*   < > 23  --   --    BUN 11  --  11  --   --    CREATININE 0.5  --  0.5  --   --    GFRAA >60  --  >60  --   --    LABGLOM >60  --  >60  --   --    GLUCOSE 124*  --  101*  --   --    PROT 7.0  --  7.0  --   --    LABALBU 3.5  --  3.6  --   --    CALCIUM 8.9  --  8.5*  --   --    BILITOT 0.6  --  0.8  --   --    ALKPHOS 91  --  86  --   --    AST 49*  --  73*  --   --    ALT 32  --  36*  --   --     < > = values in this interval not displayed.      Recent Labs     11/23/21  0837   PROCAL 0.09*     Lab Results   Component Value Date    CRP 1.4 (H) 11/23/2021    CRP 7.5 (H) 11/20/2021    CRP 1.0 (H) 10/26/2020     Lab Results   Component Value Date    SEDRATE 32 (H) 11/23/2021     No results found for: FZUGBFG8C0  Lab Results   Component Value Date    COVID19 DETECTED 11/18/2021     COVID-19/CASTILLO-COV2 LABS  Recent Labs 11/22/21  0736 11/23/21  0836 11/23/21  0837   CRP  --   --  1.4*   PROCAL  --   --  0.09*   FERRITIN  --   --  743   LDH  --   --  570*   DDIMER  --  1043  --    FIBRINOGEN  --  499  --    INR  --  1.2  --    PROTIME  --  14.1*  --    AST 49*  --  73*   ALT 32  --  36*     Lab Results   Component Value Date    CHOL 217 09/03/2021    TRIG 180 09/03/2021    HDL 44 09/03/2021    LDLCALC 137 09/03/2021    LABVLDL 36 09/03/2021        MICROBIOLOGY:     Cultures :   Lab Results   Component Value Date    BC 5 Days no growth 11/17/2021     Lab Results   Component Value Date    BLOODCULT2 5 Days no growth 11/17/2021          FINAL IMPRESSION    Patient is a 49 y.o. female who presented with No chief complaint on file. and admitted for S/P gastric bypass [Z98.84]  COVID + 11/18 ON  2LSmall left and trace right pleural effusions. Left basilar   atelectasis. LEUKOCYTOSIS resolved  FEVERS better   transaminitis      Plan:     Stop rocephin/ flagyl  Finished 5 days of remdesivir   Continue Decadron For 10 days  Continue Vitamins   Follow labs  To cont proning/bide postioning/deep or rescue breathing/IS to improve oxygenation   Going home with oxygen- nasal canula. DVT prophylaxis/TREATMENT  Pt med rec  Can d/c from ID POV- follow up as needed. Pt may receive a Covid-19 vaccine after 30 days of infection. Imaging and labs were reviewed per medical records      An opportunity to ask questions was given to the patient/FAMILY and questions were answered. Thank you for involving me in the care of [address]. Please do not hesitate to call for any questions or concerns.      Electronically signed by SEGUNDO Leong - CNS on 11/24/2021 at 10:45 AM    PT WAS D/C BEFORE ROUNDS  CAN F/U PRN   Solo Rizo MD

## 2021-11-24 NOTE — CONSULTS
Pulmonary/Critical Care Consult Note    CHIEF COMPLAINT: Shortness of breath    HISTORY OF PRESENT ILLNESS: Patient is a 60-year-old female with a history of pression, hyperlipidemia, hypertension, morbid obesity, and thyroid disease. Patient was originally admitted on 11/15/2021 for gastric bypass and hiatal hernia repair. Currently patient was screened for COVID-19 prior to her gastric bypass surgery which was negative, however a repeat test on 11/18/2021 was positive for COVID-19 and she was admitted for acute hypoxemic respiratory failure secondary to COVID-19 pneumonitis. She is currently on dexamethasone, apixaban, ceftriaxone, and metronidazole. Patient states she feels overall better today and is \"ready to go home\", however her oxygen demands is slightly increased from 2 L to 4 L per nasal cannula to keep SPO2 greater than 89%. She does complain of mild dyspnea on exertion only and has no other complaints on examination. Patient is currently on 4 L per nasal cannula with SPO2 92%      1) Acute hypoxemic respiratory failure secondary to severe COVID-19 pneumonia   --Oxygen supplementation to maintain sats > 89%, currently only using 2LNC  --Prone position recommended  --Antimicrobial regimen: Ceftriaxone + Metronidazole  --Antiviral regimen: Dexamethasone 6 mg/day X 10 days, pending inflammatory markers to decide if Tocilizumab/Baricitinib is indicated  --Cultures reviewed  --Inflammatory markers ordered  --Anticoagulation: Apixaban has been ordered by primary attending.                 ALLERGY:  Patient has no known allergies.     FAMILY HISTORY:  Family History   Problem Relation Age of Onset    Diabetes Mother        SOCIAL HISTORY:  Social History     Socioeconomic History    Marital status:      Spouse name: Not on file    Number of children: 3    Years of education: Not on file    Highest education level: Not on file   Occupational History    Not on file   Tobacco Use    Smoking status: Former Smoker     Packs/day: 0.50     Years: 20.00     Pack years: 10.00     Types: Cigarettes     Start date: 1998     Quit date: 2021     Years since quittin.3    Smokeless tobacco: Never Used   Vaping Use    Vaping Use: Never used   Substance and Sexual Activity    Alcohol use: No    Drug use: No    Sexual activity: Yes     Partners: Male   Other Topics Concern    Not on file   Social History Narrative    Not on file     Social Determinants of Health     Financial Resource Strain:     Difficulty of Paying Living Expenses: Not on file   Food Insecurity:     Worried About Running Out of Food in the Last Year: Not on file    Anais of Food in the Last Year: Not on file   Transportation Needs:     Lack of Transportation (Medical): Not on file    Lack of Transportation (Non-Medical):  Not on file   Physical Activity:     Days of Exercise per Week: Not on file    Minutes of Exercise per Session: Not on file   Stress:     Feeling of Stress : Not on file   Social Connections:     Frequency of Communication with Friends and Family: Not on file    Frequency of Social Gatherings with Friends and Family: Not on file    Attends Baptism Services: Not on file    Active Member of [address] [place] [address] [employer] or Organizations: Not on file    Attends Club or Organization Meetings: Not on file    Marital Status: Not on file   Intimate Partner Violence:     Fear of Current or Ex-Partner: Not on file    Emotionally Abused: Not on file    Physically Abused: Not on file    Sexually Abused: Not on file   Housing Stability:     Unable to Pay for Housing in the Last Year: Not on file    Number of [place] in the Last Year: Not on file    Unstable Housing in the Last Year: Not on file       MEDICAL HISTORY:  Past Medical History:   Diagnosis Date    Depression     Hyperlipidemia     Hypertension     Morbid (severe) obesity due to excess calories ([facility] [address]     Thyroid disease        MEDICATIONS:   potassium chloride  40 mEq Oral Once    hydrOXYzine  50 mg Oral TID    apixaban  5 mg Oral BID    dexamethasone  6 mg Oral Q24H    pantoprazole  20 mg Oral QAM AC    cefTRIAXone (ROCEPHIN) IV  1,000 mg IntraVENous Q24H    metroNIDAZOLE  500 mg IntraVENous Q8H    lisinopril  5 mg Oral Daily    levothyroxine  25 mcg Oral Daily    sodium chloride flush  5-40 mL IntraVENous 2 times per day      sodium chloride       traZODone, sodium chloride, acetaminophen, sodium chloride flush, sodium chloride, morphine **OR** morphine, ondansetron    REVIEW OF SYSTEMS:  Constitutional: Denies fever, weight loss, night sweats, and fatigue  Skin: Denies pigmentation, dark lesions, and rashes   HEENT: Denies hearing loss, tinnitus, ear drainage, epistaxis, sore throat, and hoarseness. Cardiovascular: Denies palpitations, chest pain, and chest pressure. Respiratory: Reports dry cough, denies dyspnea at rest, reports dyspnea on exertion hemoptysis, apnea, and choking.   Gastrointestinal: Denies nausea, vomiting, poor appetite, diarrhea, heartburn or reflux  Genitourinary: Denies dysuria, frequency, urgency or hematuria  Musculoskeletal: Denies myalgias, muscle weakness, and bone pain  Neurological: Denies dizziness, vertigo, headache, and focal weakness  Psychological: Denies anxiety and depression  Endocrine: Denies heat intolerance and cold intolerance  Hematopoietic/Lymphatic: Denies bleeding problems and blood transfusions    PHYSICAL EXAM:  Vitals:    11/23/21 0900   BP: 129/65   Pulse: 69   Resp:    Temp: 97.7 °F (36.5 °C)   SpO2: 97%        O2 Flow Rate (L/min): 4 L/min  O2 Device: Nasal cannula    Constitutional: No fever, chills, diaphoresis  HEENT: No head lesions, PERRL, EOMI, mouth without lesions, no nasal lesions, no cervical adenopathy palpated   Respiratory: Lungs with equal breath sounds diminished bilaterally, no adventitious sounds auscultated, no accessory muscle use   CV: Regular rate, no murmurs, JVD, no leg edema Abdomen: Soft, non tender, + bowel sounds, no lesions   Skin: Adequate turgor, no rash, capillary refill <2 seconds   Extremities: Muscular strength 4/4 in 4 limbs, moves 4 limbs spontaneously, distal pulses present   Neurology: Awake and alert, follows commands, moves 4 limbs on command and spontaneously, equal sensation, no dysmetria, neck is supple, no meningitic signs present.        LABS:  WBC   Date Value Ref [place] Status   11/23/2021 8.7 4.5 - 11.5 E9/L Final   11/20/2021 3.6 (L) 4.5 - 11.5 E9/L Final   11/17/2021 9.4 4.5 - 11.5 E9/L Final     Hemoglobin   Date Value Ref [place] Status   11/23/2021 13.0 11.5 - 15.5 g/dL Final   11/20/2021 12.9 11.5 - 15.5 g/dL Final   11/17/2021 12.7 11.5 - 15.5 g/dL Final     Hematocrit   Date Value Ref [place] Status   11/23/2021 37.4 34.0 - 48.0 % Final   11/20/2021 38.3 34.0 - 48.0 % Final   11/17/2021 37.7 34.0 - 48.0 % Final     MCV   Date Value Ref [place] Status   11/23/2021 88.0 80.0 - 99.9 fL Final   11/20/2021 91.2 80.0 - 99.9 fL Final   11/17/2021 93.3 80.0 - 99.9 fL Final     Platelets   Date Value Ref [place] Status   11/23/2021 367 130 - 450 E9/L Final   11/20/2021 199 130 - 450 E9/L Final   11/17/2021 201 130 - 450 E9/L Final     Sodium   Date Value Ref [place] Status   11/23/2021 145 132 - 146 mmol/L Final   11/22/2021 144 132 - 146 mmol/L Final   11/21/2021 142 132 - 146 mmol/L Final     Potassium   Date Value Ref [place] Status   11/23/2021 3.2 (L) 3.5 - 5.0 mmol/L Final   11/22/2021 3.4 (L) 3.5 - 5.0 mmol/L Final   11/21/2021 3.8 3.5 - 5.0 mmol/L Final     Chloride   Date Value Ref [place] Status   11/23/2021 104 98 - 107 mmol/L Final   11/22/2021 106 98 - 107 mmol/L Final   11/21/2021 105 98 - 107 mmol/L Final     CO2   Date Value Ref [place] Status   11/23/2021 23 22 - 29 mmol/L Final   11/22/2021 21 (L) 22 - 29 mmol/L Final   11/21/2021 25 22 - 29 mmol/L Final     BUN   Date Value Ref [place] Status   11/23/2021 11 6 - 20 mg/dL Final   11/22/2021 11 6 - 20 mg/dL Final 11/21/2021 9 6 - 20 mg/dL Final     CREATININE   Date Value Ref [place] Status   11/23/2021 0.5 0.5 - 1.0 mg/dL Final   11/22/2021 0.5 0.5 - 1.0 mg/dL Final   11/21/2021 0.5 0.5 - 1.0 mg/dL Final     Glucose   Date Value Ref [place] Status   11/23/2021 101 (H) 74 - 99 mg/dL Final   11/22/2021 124 (H) 74 - 99 mg/dL Final   11/21/2021 130 (H) 74 - 99 mg/dL Final     Calcium   Date Value Ref [place] Status   11/23/2021 8.5 (L) 8.6 - 10.2 mg/dL Final   11/22/2021 8.9 8.6 - 10.2 mg/dL Final   11/21/2021 8.5 (L) 8.6 - 10.2 mg/dL Final     Total Protein   Date Value Ref [place] Status   11/23/2021 7.0 6.4 - 8.3 g/dL Final   11/22/2021 7.0 6.4 - 8.3 g/dL Final   11/21/2021 6.1 (L) 6.4 - 8.3 g/dL Final     Albumin   Date Value Ref [place] Status   11/23/2021 3.6 3.5 - 5.2 g/dL Final   11/22/2021 3.5 3.5 - 5.2 g/dL Final   11/21/2021 3.1 (L) 3.5 - 5.2 g/dL Final     Total Bilirubin   Date Value Ref [place] Status   11/23/2021 0.8 0.0 - 1.2 mg/dL Final   11/22/2021 0.6 0.0 - 1.2 mg/dL Final   11/21/2021 0.4 0.0 - 1.2 mg/dL Final     Alkaline Phosphatase   Date Value Ref [place] Status   11/23/2021 86 35 - 104 U/L Final   11/22/2021 91 35 - 104 U/L Final   11/21/2021 79 35 - 104 U/L Final     AST   Date Value Ref [place] Status   11/23/2021 73 (H) 0 - 31 U/L Final   11/22/2021 49 (H) 0 - 31 U/L Final   11/21/2021 37 (H) 0 - 31 U/L Final     ALT   Date Value Ref [place] Status   11/23/2021 36 (H) 0 - 32 U/L Final   11/22/2021 32 0 - 32 U/L Final   11/21/2021 25 0 - 32 U/L Final     GFR Non-   Date Value Ref [place] Status   11/23/2021 >60 >=60 mL/min/1.73 Final     Comment:     Chronic Kidney Disease: less than 60 ml/min/1.73 sq.m. Kidney Failure: less than 15 ml/min/1.73 sq.m. Results valid for patients 18 years and older. 11/22/2021 >60 >=60 mL/min/1.73 Final     Comment:     Chronic Kidney Disease: less than 60 ml/min/1.73 sq.m. Kidney Failure: less than 15 ml/min/1.73 sq.m.   Results valid for patients 18 years and older. 11/21/2021 >60 >=60 mL/min/1.73 Final     Comment:     Chronic Kidney Disease: less than 60 ml/min/1.73 sq.m. Kidney Failure: less than 15 ml/min/1.73 sq.m. Results valid for patients 18 years and older. GFR    Date Value Ref [place] Status   11/23/2021 >60  Final   11/22/2021 >60  Final   11/21/2021 >60  Final     No results found for: MG  No results found for: PHOS  No results for input(s): PH, PO2, PCO2, HCO3, BE, O2SAT in the last 72 hours. RADIOLOGY:  CTA PULMONARY W CONTRAST   Final Result   1. Small left pleural effusion which is increased in size and development of   trace right pleural effusion. Previously seen bilateral ground-glass   opacities/infiltrates show progression and are larger in size. 2.  No PE identified. 3.  Nonacute findings, as above. CT CHEST W CONTRAST   Final Result   1. Small left and trace right pleural fluid. There is adjacent left basilar   atelectasis. 2. Mild scattered ground glass opacity/infiltrate in upper lobes, left   greater than right. This is nonspecific. Mild infiltrates from viral   pneumonia not excluded. CT ABDOMEN PELVIS W IV CONTRAST Additional Contrast? Oral   Final Result   1. Tubular cystic area in the midline, anterior to the uterine fundus. This   could represent right, left or bilateral hydrosalpinges. No ovarian cystic   lesion not excluded. Left ovary is visualized separate from the structure. Right ovary is not otherwise seen. 2. Minimal stranding in the right lower quadrant is of uncertain   significance. The adjacent appendix has a normal appearance. 3. Mild hepatic steatosis. 4. Small left and trace right pleural effusions. Left basilar atelectasis.                  Electronically signed by SEGUNDO Briones CNP on 11/23/2021 at 8:56 PM

## 2021-11-24 NOTE — PROGRESS NOTES
Subjective: This is a follow-up consultation note on this patient. In summary she was admitted on 11/15/2021, underwent Randy-en-Y gastric bypass surgery by general surgeon Dr. Imelda Hernandez , I saw her as a postop consultation for internal medicine on 11/16/2021, she had no medical issues or any problems at that time, I called for a follow-up telephone call with the nurse manager on the third floor on 11/17/2021 I was told she was fine and will be discharged the same day, I received a call the evening of 11/19/2021 about this patient requesting an order to discharge the patient at the same time I was told she is Covid positive, hypoxic requiring 4 L nasal cannula at rest and up to 6 L with activities, just for the records I was not notified about those events during the period from November 17 to November 19, 2021, I held the discharge and notified charge nurse back then she cannot be discharged home without addressing the active Covid and the hypoxemia. Patient tested negative for Covid 1 week prior to her surgery, according to the patient her daughter who is 8years old had positive exposure to COVID-19 at school. I reviewed the medical records in details, CT of the abdomen did not show any abnormality but CT of the chest did show bilateral upper lobe infiltrates and groundglass appearance confirming Covid 19 pneumonia. Patient inflammatory markers are elevated, she was hypoxic and required up to 6 L O2 with ambulation, nurses had to raise the O2 to 5-6 L at night even at rest, her D-dimer was elevated, she is admitting dyspnea with moderate activities with occasional cough but no chest pain. ID and pulmonary services are on the case, she is still on IV Rocephin and Flagyl, oral Decadron 6 mg once a day, and Eliquis 5 mg twice a day. Patient did spike fever on 11/17/2021 up to 101.5, but no more fever since, Blood cultures so far without any growth. the patient is awake and alert.   No problems overnight. Denies abdominal pain. Tolerating bariatric diet. No nausea or vomiting. Case discussed with general surgeon Dr. Preet Gardner in details on Saturday. Patient had no more allergic reaction since we stopped Compazine, she finished 5 days of IV remdesivir last dose was on 11/23/2021. She is still very anxious to go home. Repeat CTA of the pulmonary arteries showed no evidence of PE but larger left pleural effusion and larger in size groundglass opacities and infiltrates. Patient had a 6 minutes walking test yesterday she desaturated as low as 80% on 4 L and required increasing oxygen to 6 L her pulse ox jumped to 93%, patient is already set up for home O2.     Current Facility-Administered Medications: potassium chloride (KLOR-CON M) extended release tablet 40 mEq, 40 mEq, Oral, Once  traZODone (DESYREL) tablet 100 mg, 100 mg, Oral, Nightly PRN  hydrOXYzine (VISTARIL) capsule 50 mg, 50 mg, Oral, TID  apixaban (ELIQUIS) tablet 5 mg, 5 mg, Oral, BID  dexamethasone (DECADRON) tablet 6 mg, 6 mg, Oral, Q24H  0.9 % sodium chloride bolus, 30 mL, IntraVENous, PRN  pantoprazole (PROTONIX) tablet 20 mg, 20 mg, Oral, QAM AC  acetaminophen (TYLENOL) suspension 650 mg, 650 mg, Oral, Q6H PRN  cefTRIAXone (ROCEPHIN) 1,000 mg in sterile water 10 mL IV syringe, 1,000 mg, IntraVENous, Q24H  metronidazole (FLAGYL) 500 mg in NaCl 100 mL IVPB premix, 500 mg, IntraVENous, Q8H  lisinopril (PRINIVIL;ZESTRIL) tablet 5 mg, 5 mg, Oral, Daily  levothyroxine (SYNTHROID) tablet 25 mcg, 25 mcg, Oral, Daily  sodium chloride flush 0.9 % injection 5-40 mL, 5-40 mL, IntraVENous, 2 times per day  sodium chloride flush 0.9 % injection 5-40 mL, 5-40 mL, IntraVENous, PRN  0.9 % sodium chloride infusion, 25 mL, IntraVENous, PRN  morphine (PF) injection 2 mg, 2 mg, IntraVENous, Q2H PRN **OR** morphine injection 4 mg, 4 mg, IntraVENous, Q2H PRN  ondansetron (ZOFRAN) injection 4 mg, 4 mg, IntraVENous, Q6H PRN    Objective:    BP (!) 155/70 Pulse 73   Temp 97.5 °F (36.4 °C) (Oral)   Resp 18   Ht 5' 4\" (1.626 m)   Wt 246 lb (111.6 kg)   SpO2 94%   BMI 42.23 kg/m²   No intake/output data recorded. No intake/output data recorded. Heart:RRR, no murmurs, no gallops, or rubs. Lungs:diminished in both bases, no wheeze, rales or rhonchi  Abdomen:bowel sounds present, nontender, nondistended, no masses  No clubbing, cyanosis, trace of edema both lower extremities  No neuro changes   Skin: Mildly flushed in her face and upper chest and back but no hives or rash noted    CBC with Differential:    Lab Results   Component Value Date    WBC 8.7 11/23/2021    RBC 4.25 11/23/2021    HGB 13.0 11/23/2021    HCT 37.4 11/23/2021     11/23/2021    MCV 88.0 11/23/2021    MCH 30.6 11/23/2021    MCHC 34.8 11/23/2021    RDW 13.0 11/23/2021    LYMPHOPCT 11.0 11/23/2021    MONOPCT 6.0 11/23/2021    MYELOPCT 4.0 11/23/2021    BASOPCT 0.0 11/23/2021    MONOSABS 0.52 11/23/2021    LYMPHSABS 0.96 11/23/2021    EOSABS 0.00 11/23/2021    BASOSABS 0.00 11/23/2021    DIFFTYPE NOT REPORTED 08/08/2013     BMP:    Lab Results   Component Value Date     11/23/2021    K 3.2 11/23/2021     11/23/2021    CO2 23 11/23/2021    BUN 11 11/23/2021    LABALBU 3.6 11/23/2021    CREATININE 0.5 11/23/2021    CALCIUM 8.5 11/23/2021    GFRAA >60 11/23/2021    LABGLOM >60 11/23/2021    GLUCOSE 101 11/23/2021     LDH:    Lab Results   Component Value Date     11/23/2021     PT/INR:    Lab Results   Component Value Date    PROTIME 14.1 11/23/2021    INR 1.2 11/23/2021     FERRITIN:    Lab Results   Component Value Date    FERRITIN 743 11/23/2021     Rad:    CTA PULMONARY W CONTRAST   Final Result   1. Small left pleural effusion which is increased in size and development of   trace right pleural effusion. Previously seen bilateral ground-glass   opacities/infiltrates show progression and are larger in size. 2.  No PE identified.       3.  Nonacute findings, as above.         CT CHEST W CONTRAST   Final Result   1. Small left and trace right pleural fluid. There is adjacent left basilar   atelectasis. 2. Mild scattered ground glass opacity/infiltrate in upper lobes, left   greater than right. This is nonspecific. Mild infiltrates from viral   pneumonia not excluded. CT ABDOMEN PELVIS W IV CONTRAST Additional Contrast? Oral   Final Result   1. Tubular cystic area in the midline, anterior to the uterine fundus. This   could represent right, left or bilateral hydrosalpinges. No ovarian cystic   lesion not excluded. Left ovary is visualized separate from the structure. Right ovary is not otherwise seen. 2. Minimal stranding in the right lower quadrant is of uncertain   significance. The adjacent appendix has a normal appearance. 3. Mild hepatic steatosis. 4. Small left and trace right pleural effusions. Left basilar atelectasis. Assessment:  1) Acute hypoxemic respiratory failure secondary to severe COVID-19 pneumonia  2) Leukocytosis  3) Acute COVID-19 infection  4) status post Randy en Y gastric bypass surgery  5) HTN  6) HLD  7) Severe Obesity  8) GERD  9) Hyperinsulinemia  10) possible allergic reaction to Compazin  11) mild hypokalemia      Plan: Okay to discharge patient home today on oxygen therapy since she qualified for it and wean as tolerated, oral Decadron total of 10 days 6 mg once a day, will need another 5 more days upon discharge, Eliquis 5 mg twice a day for a total of 4 weeks, keep in isolation, quarantine for up to 14 days, no more antibiotics are needed as per ID service.  We will do stat potassium level today and if within normal values will okay discharge          Electronically signed by Trupti Le MD on 11/24/2021 at 8:34 AM

## 2021-11-24 NOTE — PROGRESS NOTES
GENERAL SURGERY  DAILY PROGRESS NOTE  11/24/2021    Cc: febrile    Subjective:  Still doing well, no issues with saturations yesterday, still on 4L    Objective:  BP (!) 155/70   Pulse 73   Temp 97.5 °F (36.4 °C) (Oral)   Resp 18   Ht 5' 4\" (1.626 m)   Wt 246 lb (111.6 kg)   SpO2 94%   BMI 42.23 kg/m²     General appearance: No acute distress  Eyes: grossly normal  Lungs: nonlabored breathing on 4LNC  Heart: regular   Abdomen: soft, non tender   Neurologic: Alert and oriented x 3. Grossly normal  Musculoskeletal: No clubbing cyanosis    Recent Labs     11/23/21  0837 11/20/21  0501 11/17/21  1825   WBC 8.7 3.6* 9.4   HGB 13.0 12.9 12.7   HCT 37.4 38.3 37.7   MCV 88.0 91.2 93.3    199 201      Lab Results   Component Value Date     11/23/2021    K 3.2 11/23/2021     11/23/2021    CO2 23 11/23/2021    BUN 11 11/23/2021    CREATININE 0.5 11/23/2021    GLUCOSE 101 11/23/2021    CALCIUM 8.5 11/23/2021         Assessment/Plan:  49 y.o. female POD9 s/p lap andreea en y gastric bypass and hiatal hernia repair.  Postop hypoxia due to COVID-19 pneumonia    bariatric liquids  Infectious disease following for treatment of COVID-19 infection  Pulmonology also following  No acute surgical issues  DC likely today per ID note from yesterday  Discharge when okay with primary care provider, infectious disease, pulmonology    Electronically signed by Rosa Sánchez DO on 11/24/2021 at 7:32 AM      As above

## 2021-11-26 ENCOUNTER — CARE COORDINATION (OUTPATIENT)
Dept: CARE COORDINATION | Age: 51
End: 2021-11-26

## 2021-11-26 NOTE — CARE COORDINATION
Care Transitions Outreach Attempt    Call within 2 business days of discharge: Yes   Attempted to reach patient by telephone. Unable to reach patient. Left HIPAA compliant message requesting a return call. Will attempt to reach patient again. Patient: Jonatan Richardson Patient : 1970 MRN: <[id number]>    Last Discharge [facility]       Complaint Diagnosis Description Type Department Provider    11/15/21  Pre-op testing . .. Admission (Discharged) Ritika Guevara MD            Was this an external facility discharge?  No Discharge Facility: [facility]      Noted following upcoming appointments from discharge chart review:   [facility] follow up appointment(s):   Future Appointments   Date Time Provider Su Zee   2021  1:45 PM Chrsity Haines MD Surg Weight [facility]   2021  1:45 PM Afia Sellers RD, LD Surg Weight [facility]     Non-[facility] follow up appointment(s):

## 2021-11-30 ENCOUNTER — CARE COORDINATION (OUTPATIENT)
Dept: CARE COORDINATION | Age: 51
End: 2021-11-30

## 2021-11-30 LAB
ALBUMIN SERPL-MCNC: 4.1 G/DL (ref 3.5–5.2)
ALP BLD-CCNC: 80 U/L (ref 35–104)
ALT SERPL-CCNC: 81 U/L (ref 0–32)
ANION GAP SERPL CALCULATED.3IONS-SCNC: 12 MMOL/L (ref 7–16)
AST SERPL-CCNC: 74 U/L (ref 0–31)
BILIRUB SERPL-MCNC: 0.7 MG/DL (ref 0–1.2)
BUN BLDV-MCNC: 20 MG/DL (ref 6–20)
CALCIUM SERPL-MCNC: 9.8 MG/DL (ref 8.6–10.2)
CHLORIDE BLD-SCNC: 103 MMOL/L (ref 98–107)
CO2: 25 MMOL/L (ref 22–29)
CREAT SERPL-MCNC: 0.8 MG/DL (ref 0.5–1)
GFR AFRICAN AMERICAN: >60
GFR NON-AFRICAN AMERICAN: >60 ML/MIN/1.73
GLUCOSE BLD-MCNC: 85 MG/DL (ref 74–99)
HCT VFR BLD CALC: 46.1 % (ref 34–48)
HEMOGLOBIN: 15.3 G/DL (ref 11.5–15.5)
MCH RBC QN AUTO: 31 PG (ref 26–35)
MCHC RBC AUTO-ENTMCNC: 33.2 % (ref 32–34.5)
MCV RBC AUTO: 93.3 FL (ref 80–99.9)
PDW BLD-RTO: 14.2 FL (ref 11.5–15)
PLATELET # BLD: 431 E9/L (ref 130–450)
PMV BLD AUTO: 10.1 FL (ref 7–12)
POTASSIUM SERPL-SCNC: 4.1 MMOL/L (ref 3.5–5)
RBC # BLD: 4.94 E12/L (ref 3.5–5.5)
SODIUM BLD-SCNC: 140 MMOL/L (ref 132–146)
TOTAL PROTEIN: 7.2 G/DL (ref 6.4–8.3)
WBC # BLD: 8.8 E9/L (ref 4.5–11.5)

## 2021-11-30 NOTE — CARE COORDINATION
Care Transitions Outreach Attempt    Call within 2 business days of discharge: Yes   Second and final attempt to reach patient by telephone. Unable to reach. Left HIPAA compliant message requesting a return call with contact information. If no callback, Care Transitions to sign off. Patient: Alex Lipscomb Patient : 1970 MRN: <[id number]>    Last Discharge [facility]       Complaint Diagnosis Description Type Department Provider    11/15/21  Pre-op testing . .. Admission (Discharged) Tammy Mckeon MD            Was this an external facility discharge?  No Discharge Facility: [facility]      Noted following upcoming appointments from discharge chart review:   [facility] follow up appointment(s):   Future Appointments   Date Time Provider Su Zee   2021  1:45 PM Ben Camarillo MD Surg Weight [facility]   2021  1:45 PM Melissa Soria RD, LD Surg Weight [facility]     Non-[facility] follow up appointment(s):

## 2021-12-01 ENCOUNTER — INITIAL CONSULT (OUTPATIENT)
Dept: BARIATRICS/WEIGHT MGMT | Age: 51
End: 2021-12-01

## 2021-12-01 ENCOUNTER — OFFICE VISIT (OUTPATIENT)
Dept: BARIATRICS/WEIGHT MGMT | Age: 51
End: 2021-12-01
Payer: COMMERCIAL

## 2021-12-01 VITALS
TEMPERATURE: 96.9 F | WEIGHT: 229 LBS | HEIGHT: 64 IN | SYSTOLIC BLOOD PRESSURE: 130 MMHG | RESPIRATION RATE: 20 BRPM | DIASTOLIC BLOOD PRESSURE: 70 MMHG | HEART RATE: 82 BPM | BODY MASS INDEX: 39.09 KG/M2

## 2021-12-01 VITALS — BODY MASS INDEX: 39.09 KG/M2 | HEIGHT: 64 IN | WEIGHT: 229 LBS

## 2021-12-01 DIAGNOSIS — Z71.3 DIETARY COUNSELING: Primary | ICD-10-CM

## 2021-12-01 DIAGNOSIS — K91.2 MALNUTRITION FOLLOWING GASTROINTESTINAL SURGERY: Primary | ICD-10-CM

## 2021-12-01 PROCEDURE — 99024 POSTOP FOLLOW-UP VISIT: CPT | Performed by: SURGERY

## 2021-12-01 PROCEDURE — 99212 OFFICE O/P EST SF 10 MIN: CPT

## 2021-12-01 PROCEDURE — 99999 PR OFFICE/OUTPT VISIT,PROCEDURE ONLY: CPT

## 2021-12-01 RX ORDER — OMEPRAZOLE 20 MG/1
20 CAPSULE, DELAYED RELEASE ORAL 2 TIMES DAILY
Qty: 30 CAPSULE | Refills: 3 | Status: SHIPPED
Start: 2021-12-01 | End: 2022-02-14

## 2021-12-01 NOTE — PROGRESS NOTES
2 week LRYGB post op. Labs are in Epic and is scheduled with Darci Evangelista for dietary. Water intake is 48 oz daily, having bowel movements and getting protein through shakes. Pt states that her incisions are healing well.

## 2021-12-01 NOTE — PROGRESS NOTES
Medical Nutrition Therapy (MNT) Assessment     Pt. Name: Rosita Conklin   Date:12/1/2021  F/U Appt: Sx Type 2 week post op rygb    Food Records Kept: No  24Hour Recall Completed at Office:No    Ht 5' 4\" (1.626 m)   Wt 229 lb (103.9 kg)   LMP  (LMP Unknown)   BMI 39.31 kg/m²  Height: 5' 4\" (1.626 m) Weight: 229 lb (103.9 kg)   IBW:ideal body weight   152 lbs  % EBWL: 18%     Wt Readings from Last 3 Encounters:   12/01/21 229 lb (103.9 kg)   12/01/21 229 lb (103.9 kg)   11/15/21 246 lb (111.6 kg)                     Protein supplements: Pt. is currently using the following protein supplement Nectar and consuming the following grams of protein 70. Rd / Ld reviewed with patient based on 1.0 gram per kg of IBW patient needs 69-79 grams of protein total daily.       Subjective:                   Current MNT:       Bariatric full liquid diet with MVI, Calcium & Protein Supplements     MNT Advanced to:     Bariatric puree diet with MVI, Calcium & Protein Supplements      Allergies and Food Allergies and Food Intolerances:none    Nutritional Data  No - Poor appetite more than 5 days     No - NPO or clear liquid more than 3 days     no- Problem Chewing      No - Problem Swallowing      No - Problem Mouth Pain      No - Problem Denture  No - Missing Teeth         No - Pressure Sore    No - Open Wound    No - Surgical Wound  No - Documented: Sepsis or Infection    No - Nausea  No - Vomiting    [facility] Bowel Protocol:  Patient states he / she has the following bowel movements per week 7  no - Diarrhea  No - Steatorrhea  No - Constipation  When was your last bowel movement yesterday   How much plain water are you drinking daily 60 oz plus  What other beverages / fluids are you drinking daily and the amount decaf coffee  No - Are you taking Colace daily  What amount of Colace are you taking daily none  No - Are you taking Sugar Free Chewable Fiber Gummies  What amount of Sugar Free Chewable Fiber Gummies are you taking daily none  yes - Did your surgeon discuss constipation with you? No - Did your surgeon discuss increasing water intake? How much water were you instructed to take daily? Continue current   No - Did your surgeon discuss continuing Colace? How much Colace did your surgeon instruct you to take? na  No - Did your surgeon discuss stopping Colace? No - Did your surgeon discuss starting Fiber Gummies? How much Fiber Gummies did your surgeon instruct you to take? na  Did your surgeon discuss any other medications / supplements to take daily to move your bowels and avoid constipation?no  No Patient was provided today the Constipation Handout  Yes Rd Ld reinforced pt needs to consume the following - Water Intake should be 64 oz, Fiber Chews prn, Dietary Fiber Intake 25 grams plus        No - Hair loss   No - Weight regain       No - Acid Reflux  No - Dumping Syndrome      No - Food gets stuck  No - Are you eating solids - should not be eating solids until 6 weeks post-op. no - Night Time Coughing  not applicable -  LUKAS [place] Noted  No - Are you chewing thoroughly  - Does not take effect until 6 weeks post-op  No - Are you hungry after eating     How many meals a day 6 / Portions Sizes of Meals are 3 oz          Labs on 11/30/21:  ALT 81H, AST 74H    Supplements: not started yet     Estimated Daily Nutritional Needs: Based on Bariatric procedure for Wt. Loss  Energy: Will be calculated at Maintenance Stage    Protein: 60  80 gms Daily    MNT Plan and Additional Education: RD/LD reviewed Bariatric Puree Diet and how to puree food. Encouraged pt to meet protein and fluid needs daily. Pt. verbalized understanding. Handouts given. Pt. Instructed to call with questions.        MEDICAL NUTRITION THERAPY (MNT)  Nutrition Care Plan   (The patient has been educated and given written education material that reinforces the following dietary guidelines for Bariatric Surgery)  Goal:  Patient able to verbalize the following dietary concepts:  3 to 4 ounce portions per meal     6 small meals daily      60 to 80 grams of protein   48 to 64 ounces of fluid daily (water)     Always consume protein item first with all meals   Pt is aware wt loss is pt controlled. Slow Meals  30-35 chews per bite / Meals 30  45 minutes long            The RD / LD reviewed with the patient that the dietary goals of the bariatric patient is to ensure that patient is able to meet established nutritional needs for a Bariatric Surgery  Patient at this time in order to promote healing, prevent significant weight changes, prevent skin breakdown and abnormal lab values, prevent complications, and tolerance to diet and texture of foods. Pt is able to identify proper food choices and needed changes and is able to explain proper food preparation. RD / LD reviewed compliance with assigned diet stages, volume of food and drink consumed, timing of meals, amount of protein and energy intake, daily vitamin and mineral supplementation, identify food cravings and any adverse reactions associated with intake of food and drink. RD / LD uses behavioral tools such as goal setting, MI, and self-monitoring, to reinforce the anatomic and physiologic effects of the surgery, so that the described behaviors become more of a habit not simply a reaction to the altered anatomy. Care Plan:   · Rd/Ld Addressed Food Records or 24-Hour Recall  · Rd / Ld encouraged patient to exercise at least 30 minutes daily  · Rd / Ld instructed patient on how to increase oral protein intake within the diet. Pt. can verbalize he / she will need to consume 60 - 80 grams. · Rd / Ld instructed the patient on how to increase the use of protein supplements within the diet. · Pt. was instructed on how to increase water intake. Patient will need to consume 48 - 64 oz. of just plain water in addition to 30 oz. of non-caloric beverages.   · Handouts given to patient  · RD / LD encouraged oral intake    · RD / LD encouraged pt. to keep food records.       · Pt. is able to verbalize diet concepts      No - Constipation Handout Given and Reviewed    No - High Fiber Handout Given and Reviewed      No - Ulcer Handout Given and Reviewed          No - Pt. was instructed to continue current MNT   Yes - Pt. diet was advanced to the following stage ( See above)   No - Supplements: initiated (See list below  - Pt. given instruction)     No - Supplemental foods:______________________  No - Pt. was placed on a altered meal schedule    Good - Dietary Compliance

## 2021-12-01 NOTE — PROGRESS NOTES
Barb vásquez  12/1/2021  Laparoscopic Randy-en- Y Gastric Bypass  Two weeks Post-Op Follow-up. Subjective:   Omar Andersen is a 49 y.o. female is two weeks post Laparoscopic Randy-en-Y Gastric Bypass. She stayed in the hospital for over a week due to developing COVID 19 and needing therapy for that and was sent home with O2 therapy and has stopped this on her home. The patient is not having any pain. Reports no problems with swallowing liquids, bowel movements, voiding, or the wounds. She is not having swallowing difficulty, is compliant most of the time with the multivitamins and calcium + Vit D. She is meeting fluid recommendations of at least 64 ounces per day and is meeting protein recommendations. Exercise: no regular exercise. 229 lb (103.9 kg) Today's weight represents a weight loss of 17 pounds since surgery. Prior to Admission medications    Medication Sig Start Date End Date Taking?  Authorizing Provider   apixaban (ELIQUIS) 5 MG TABS tablet Take 1 tablet by mouth 2 times daily for 28 days 11/24/21 12/22/21 Yes Badi Altawil, MD   metFORMIN (GLUCOPHAGE) 500 MG tablet Take 1 tablet by mouth 2 times daily (with meals) 11/24/21  Yes Dannie Roche MD   omeprazole (PRILOSEC) 20 MG delayed release capsule Take 1 capsule by mouth daily 11/10/21  Yes Gracie Means MD   ondansetron (ZOFRAN-ODT) 4 MG disintegrating tablet Place 1 tablet under the tongue every 8 hours as needed for Nausea or Vomiting 11/10/21  Yes Gracie Means MD   levothyroxine (SYNTHROID) 25 MCG tablet Take 25 mcg by mouth daily 2/3/21  Yes Historical Provider, MD   LATUDA 60 MG TABS tablet Take 60 mg by mouth every evening 1/29/21  Yes Historical Provider, MD   traZODone (DESYREL) 100 MG tablet Take 100 tablets by mouth nightly as needed 1/29/21  Yes Historical Provider, MD   cyclobenzaprine (FLEXERIL) 10 MG tablet Take 10 mg by mouth daily   Yes Historical Provider, MD   hydrOXYzine (VISTARIL) 50 MG capsule Take 50 mg by mouth 3 times daily  Patient not taking: Reported on 12/1/2021 1/18/21   Historical Provider, MD        Physical exam:  VITALS: /70 (Site: Left Upper Arm, Position: Sitting, Cuff Size: Large Adult)   Pulse 82   Temp 96.9 °F (36.1 °C) (Temporal)   Resp 20   Ht 5' 4\" (1.626 m)   Wt 229 lb (103.9 kg)   LMP  (LMP Unknown)   BMI 39.31 kg/m²    General appearance: alert, appears stated age and cooperative  Head: Normocephalic, without obvious abnormality, atraumatic  Neck: no adenopathy, no carotid bruit, no JVD, supple, symmetrical, trachea midline and thyroid not enlarged, symmetric, no tenderness/mass/nodules  Lungs: clear to auscultation bilaterally  Heart: regular rate and rhythm  Abdomen:  Incisions healing well, surgical glue in place, no allergic reaction, no cellulitis  Extremities: extremities normal, atraumatic, no cyanosis or edema    Assessment:    Doing well two weeks post laparoscopic gastric bypass and covid 19    Plan:   Keep up good work. Walk as much as possible but keep your legs elevated when sitting. Continue deep breathing exercizes. Transition to the high protein Pureed-liquid diet. Continue drinking 1 oz every 10-15 minutes to prevent dehydration. Slowly increase this amount as tolerated. Aim for 60 gm Protein and 90 oz of liquids per day. Slow down if you feel chest pressure, acid or fullness. Track protein and fluid intake and bring to your next appointment. Follow up in 4 weeks and call the clinic if questions arise in the meantime. Repeat labs in one month. Make sure the bowel move daily by taking fiber such as Metamucil.       Physician Signature: Electronically signed by Dr. Candace Garza MD

## 2021-12-17 DIAGNOSIS — K91.2 MALNUTRITION FOLLOWING GASTROINTESTINAL SURGERY: Primary | ICD-10-CM

## 2021-12-20 DIAGNOSIS — K91.2 MALNUTRITION FOLLOWING GASTROINTESTINAL SURGERY: ICD-10-CM

## 2021-12-20 LAB
ALBUMIN SERPL-MCNC: 4.1 G/DL (ref 3.5–5.2)
ALP BLD-CCNC: 112 U/L (ref 35–104)
ALT SERPL-CCNC: 33 U/L (ref 0–32)
ANION GAP SERPL CALCULATED.3IONS-SCNC: 19 MMOL/L (ref 7–16)
AST SERPL-CCNC: 40 U/L (ref 0–31)
BILIRUB SERPL-MCNC: 0.7 MG/DL (ref 0–1.2)
BUN BLDV-MCNC: 8 MG/DL (ref 6–20)
CALCIUM SERPL-MCNC: 10.7 MG/DL (ref 8.6–10.2)
CHLORIDE BLD-SCNC: 104 MMOL/L (ref 98–107)
CHOLESTEROL, TOTAL: 204 MG/DL (ref 0–199)
CO2: 22 MMOL/L (ref 22–29)
CREAT SERPL-MCNC: 1 MG/DL (ref 0.5–1)
FERRITIN: 166 NG/ML
FOLATE: >20 NG/ML (ref 4.8–24.2)
GFR AFRICAN AMERICAN: >60
GFR NON-AFRICAN AMERICAN: 59 ML/MIN/1.73
GLUCOSE BLD-MCNC: 137 MG/DL (ref 74–99)
HCT VFR BLD CALC: 47.6 % (ref 34–48)
HEMOGLOBIN: 15.1 G/DL (ref 11.5–15.5)
MCH RBC QN AUTO: 30.5 PG (ref 26–35)
MCHC RBC AUTO-ENTMCNC: 31.7 % (ref 32–34.5)
MCV RBC AUTO: 96.2 FL (ref 80–99.9)
PDW BLD-RTO: 14.7 FL (ref 11.5–15)
PLATELET # BLD: 274 E9/L (ref 130–450)
PMV BLD AUTO: 10.7 FL (ref 7–12)
POTASSIUM SERPL-SCNC: 4.8 MMOL/L (ref 3.5–5)
PREALBUMIN: 19 MG/DL (ref 20–40)
RBC # BLD: 4.95 E12/L (ref 3.5–5.5)
SODIUM BLD-SCNC: 145 MMOL/L (ref 132–146)
TOTAL PROTEIN: 7.5 G/DL (ref 6.4–8.3)
TRIGL SERPL-MCNC: 247 MG/DL (ref 0–149)
VITAMIN B-12: 1041 PG/ML (ref 211–946)
WBC # BLD: 6.7 E9/L (ref 4.5–11.5)

## 2021-12-24 LAB — ZINC: 137.2 UG/DL (ref 60–120)

## 2021-12-27 LAB — VITAMIN B1 WHOLE BLOOD: 127 NMOL/L (ref 70–180)

## 2021-12-29 ENCOUNTER — OFFICE VISIT (OUTPATIENT)
Dept: BARIATRICS/WEIGHT MGMT | Age: 51
End: 2021-12-29
Payer: COMMERCIAL

## 2021-12-29 ENCOUNTER — INITIAL CONSULT (OUTPATIENT)
Dept: BARIATRICS/WEIGHT MGMT | Age: 51
End: 2021-12-29

## 2021-12-29 VITALS
BODY MASS INDEX: 37.39 KG/M2 | TEMPERATURE: 97.5 F | WEIGHT: 219 LBS | HEIGHT: 64 IN | SYSTOLIC BLOOD PRESSURE: 131 MMHG | RESPIRATION RATE: 20 BRPM | HEART RATE: 119 BPM | DIASTOLIC BLOOD PRESSURE: 91 MMHG

## 2021-12-29 VITALS — WEIGHT: 219 LBS | BODY MASS INDEX: 37.59 KG/M2

## 2021-12-29 DIAGNOSIS — K91.2 MALNUTRITION FOLLOWING GASTROINTESTINAL SURGERY: Primary | ICD-10-CM

## 2021-12-29 DIAGNOSIS — Z71.3 DIETARY COUNSELING: Primary | ICD-10-CM

## 2021-12-29 PROCEDURE — 99212 OFFICE O/P EST SF 10 MIN: CPT

## 2021-12-29 PROCEDURE — 99999 PR OFFICE/OUTPT VISIT,PROCEDURE ONLY: CPT

## 2021-12-29 PROCEDURE — 99024 POSTOP FOLLOW-UP VISIT: CPT | Performed by: SURGERY

## 2021-12-29 NOTE — PROGRESS NOTES
Medical Nutrition Therapy (MNT) Assessment     Pt. Name: Alex Lepe   Date:12/29/2021  F/U Appt: Sx Type 6 week rygb     Food Records Kept: No  24Hour Recall Completed at Office:No    Wt 219 lb (99.3 kg)   LMP  (LMP Unknown)   BMI 37.59 kg/m²  Height:   Weight: 219 lb (99.3 kg)   IBW:ideal body weight   152 lbs  % EBWL: 28%     Wt Readings from Last 3 Encounters:   12/29/21 219 lb (99.3 kg)   12/29/21 219 lb (99.3 kg)   12/01/21 229 lb (103.9 kg)                     Protein supplements: Pt. is currently using the following protein supplement Nectar and consuming the following grams of protein 75. Rd / Ld reviewed with patient based on 1.0 gram per kg of IBW patient needs 69-79 grams of protein total daily. Subjective:                   Current MNT:       Bariatric pureed     MNT Advanced to:     Bariatric soft diet with MVI, Calcium & Protein Supplements        [facility] Bowel Protocol:  Patient states he / she has the following bowel movements per week 7  no - Diarrhea  No - Steatorrhea  No - Constipation  When was your last bowel movement today   How much plain water are you drinking daily 58 - 67 oz  What other beverages / fluids are you drinking daily and the amount *decaf tea*  Yes - Are you taking Colace daily  What amount of Colace are you taking daily 1  Yes - Are you taking Sugar Free Chewable Fiber Gummies  What amount of Sugar Free Chewable Fiber Gummies are you taking daily  Takes benefiber daily   Yes - Did your surgeon discuss constipation with you? No - Did your surgeon discuss increasing water intake? How much water were you instructed to take daily? na   No - Did your surgeon discuss continuing Colace? How much Colace did your surgeon instruct you to take? na  No - Did your surgeon discuss stopping Colace? No - Did your surgeon discuss starting Fiber Gummies?   How much Fiber Gummies did your surgeon instruct you to take? na  Did your surgeon discuss any other medications / supplements to take daily to move your bowels and avoid constipation? No Patient was provided today the Constipation Handoutno  Yes Rd Ld reinforced pt needs to consume the following - Water Intake should be 64 oz, Fiber Chews prn, Dietary Fiber Intake 25 g        No - Hair loss   No - Weight regain       No - Acid Reflux  No - Dumping Syndrome      No - Food gets stuck  No - Are you eating solids - should not be eating solids until 6 weeks post-op. no - Night Time Coughing  not applicable -  LUKAS [place] Noted  No - Are you chewing thoroughly  - Does not take effect until 6 weeks post-op  No - Are you hungry after eating     How many meals a day 5-6 / Portions Sizes of Meals are 3-4 oz          12/20/21 Labs: glucose 137, alk phos 112H, alt 33h, ast 40h, zinc 137.2h, b12 1041H, trigly 247H, prealbumin 19L, chol 204H    Supplements: Bariatric Advantage Multi-Vitamin 1 tablet 2 times Daily, Bariatric Advantage 29 mgs Iron 1 tablet Daily, Bariatric Advanatge Calcium Lozenges 1 tablet 3 times Daily , Dry Vitamin D3 5,000iu every day    Estimated Daily Nutritional Needs: Based on Bariatric procedure for Wt. Loss  Energy: Will be calculated at Maintenance Stage    Protein: 60  80 gms Daily    MNT Plan and Additional Education: RD/LD reviewed Bariatric Soft Diet. Encouraged pt to meet protein and fluid needs daily. Pt. verbalized understanding. Handouts given. Pt. instructed to call with questions. \  MEDICAL NUTRITION THERAPY (MNT)  Nutrition Care Plan   (The patient has been educated and given written education material that reinforces the following dietary guidelines for Bariatric Surgery)  Goal:  Patient able to verbalize the following dietary concepts:  3 to 4 ounce portions per meal     6 small meals daily      60 to 80 grams of protein   48 to 64 ounces of fluid daily (water)     Always consume protein item first with all meals   Pt is aware wt loss is pt controlled.    Slow Meals  30-35 chews per bite / Meals 30  45 minutes long            The RD / LD reviewed with the patient that the dietary goals of the bariatric patient is to ensure that patient is able to meet established nutritional needs for a Bariatric Surgery  Patient at this time in order to promote healing, prevent significant weight changes, prevent skin breakdown and abnormal lab values, prevent complications, and tolerance to diet and texture of foods. Pt is able to identify proper food choices and needed changes and is able to explain proper food preparation. RD / LD reviewed compliance with assigned diet stages, volume of food and drink consumed, timing of meals, amount of protein and energy intake, daily vitamin and mineral supplementation, identify food cravings and any adverse reactions associated with intake of food and drink. RD / LD uses behavioral tools such as goal setting, MI, and self-monitoring, to reinforce the anatomic and physiologic effects of the surgery, so that the described behaviors become more of a habit not simply a reaction to the altered anatomy. Care Plan:   · Rd/Ld Addressed Food Records or 24-Hour Recall  · Rd / Ld encouraged patient to exercise at least 30 minutes daily  · Rd / Ld instructed patient on how to increase oral protein intake within the diet. Pt. can verbalize he / she will need to consume 60 - 80 grams. · Rd / Ld instructed the patient on how to increase the use of protein supplements within the diet. · Pt. was instructed on how to increase water intake. Patient will need to consume 48 - 64 oz. of just plain water in addition to 30 oz. of non-caloric beverages. · Handouts given to patient  · RD / LD encouraged oral intake    · RD / LD encouraged pt. to keep food records.       · Pt. is able to verbalize diet concepts      No - Constipation Handout Given and Reviewed    No - High Fiber Handout Given and Reviewed      No - Ulcer Handout Given and Reviewed          No - Pt. was instructed to continue current MNT   Yes - Pt. diet was advanced to the following stage ( See above)   No - Supplements: initiated (See list below  - Pt. given instruction)     No - Supplemental foods:______________________  No - Pt. was placed on a altered meal schedule    Good - Dietary Compliance

## 2021-12-29 NOTE — PATIENT INSTRUCTIONS
Please continue to take your vitamin and mineral supplements as instructed. If you received a blood work prescription today for laboratory monitoring due prior to your next routine follow-up visit, please have this blood work obtained 10 to 14 days prior to your next visit. It is important to fast for 12 hours prior to routine weight loss surgery blood work, EXCEPT for drinking water, to ensure accuracy of results. Please report nausea, vomiting, abdominal pain, or any other problems you experience to your surgeon. For problems related to weight loss surgery, it is best to go to [address] [place] [address] Emergency Department and have your surgeon paged.

## 2021-12-29 NOTE — PROGRESS NOTES
Patient is 6 wk LRYGB. Water intake is around 64 oz daily. Protein through shakes and foods. Vitamin intake is - iron only. Bowel movements are good. Scheduled with Sidney Sharpe for dietary.
Physical exam:  VITALS:   BP (!) 131/91 (Site: Right Lower Arm, Position: Sitting, Cuff Size: Large Adult)   Pulse 119   Temp 97.5 °F (36.4 °C) (Temporal)   Resp 20   Ht 5' 4\" (1.626 m)   Wt 219 lb (99.3 kg)   LMP  (LMP Unknown)   BMI 37.59 kg/m²    General appearance: alert, appears stated age and cooperative  Head: Normocephalic, without obvious abnormality, atraumatic  Neck: no adenopathy, no carotid bruit, no JVD, supple, symmetrical, trachea midline and thyroid not enlarged, symmetric, no tenderness/mass/nodules  Lungs: clear to auscultation bilaterally  Heart: regular rate and rhythm, S1, S2 normal, no murmur, click, rub or gallop  Abdomen: soft, non-tender; bowel sounds normal; no masses,  no organomegaly  Extremities: extremities normal, atraumatic, no cyanosis or edema    Assessment: doing well 6 weeks post Laparoscopic Randy-en- Y Gastric Bypass. Cholesterol and triglycerides high    Plan: watch fatty foods, discuss with pcp cholesterol meds. Transition to the soft high protein diet. Eat small portions very slowly and chew until the food is liquified before swallowing. Track protein and fluids and bring to the next appointment, maintain adequate variety and balance. Follow up in 6 weeks and contact me if questions arise in the meantime. Exercise 7 days a week at least 30 minutes. Repeat labs before the next appointment. Make sure the bowel move daily by taking fiber.        Physician Signature: Electronically signed by Dr. Sigrid Ca MD

## 2022-02-21 ENCOUNTER — TELEPHONE (OUTPATIENT)
Dept: BARIATRICS/WEIGHT MGMT | Age: 52
End: 2022-02-21

## 2022-02-25 DIAGNOSIS — K91.2 MALNUTRITION FOLLOWING GASTROINTESTINAL SURGERY: ICD-10-CM

## 2022-02-25 LAB
ALBUMIN SERPL-MCNC: 3.7 G/DL (ref 3.5–5.2)
ALP BLD-CCNC: 111 U/L (ref 35–104)
ALT SERPL-CCNC: 13 U/L (ref 0–32)
ANION GAP SERPL CALCULATED.3IONS-SCNC: 10 MMOL/L (ref 7–16)
AST SERPL-CCNC: 18 U/L (ref 0–31)
BILIRUB SERPL-MCNC: 0.5 MG/DL (ref 0–1.2)
BUN BLDV-MCNC: 9 MG/DL (ref 6–20)
CALCIUM SERPL-MCNC: 9.4 MG/DL (ref 8.6–10.2)
CHLORIDE BLD-SCNC: 107 MMOL/L (ref 98–107)
CHOLESTEROL, TOTAL: 161 MG/DL (ref 0–199)
CO2: 27 MMOL/L (ref 22–29)
CREAT SERPL-MCNC: 0.8 MG/DL (ref 0.5–1)
FERRITIN: 110 NG/ML
FOLATE: 10.9 NG/ML (ref 4.8–24.2)
GFR AFRICAN AMERICAN: >60
GFR NON-AFRICAN AMERICAN: >60 ML/MIN/1.73
GLUCOSE BLD-MCNC: 82 MG/DL (ref 74–99)
HCT VFR BLD CALC: 43 % (ref 34–48)
HEMOGLOBIN: 13.8 G/DL (ref 11.5–15.5)
MCH RBC QN AUTO: 29.7 PG (ref 26–35)
MCHC RBC AUTO-ENTMCNC: 32.1 % (ref 32–34.5)
MCV RBC AUTO: 92.5 FL (ref 80–99.9)
PDW BLD-RTO: 14.7 FL (ref 11.5–15)
PLATELET # BLD: 277 E9/L (ref 130–450)
PMV BLD AUTO: 10.2 FL (ref 7–12)
POTASSIUM SERPL-SCNC: 4 MMOL/L (ref 3.5–5)
PREALBUMIN: 17 MG/DL (ref 20–40)
RBC # BLD: 4.65 E12/L (ref 3.5–5.5)
SODIUM BLD-SCNC: 144 MMOL/L (ref 132–146)
TOTAL PROTEIN: 7 G/DL (ref 6.4–8.3)
TRIGL SERPL-MCNC: 153 MG/DL (ref 0–149)
VITAMIN B-12: 634 PG/ML (ref 211–946)
WBC # BLD: 7.8 E9/L (ref 4.5–11.5)

## 2022-03-02 ENCOUNTER — INITIAL CONSULT (OUTPATIENT)
Dept: BARIATRICS/WEIGHT MGMT | Age: 52
End: 2022-03-02

## 2022-03-02 ENCOUNTER — OFFICE VISIT (OUTPATIENT)
Dept: BARIATRICS/WEIGHT MGMT | Age: 52
End: 2022-03-02
Payer: COMMERCIAL

## 2022-03-02 VITALS
RESPIRATION RATE: 20 BRPM | WEIGHT: 211 LBS | DIASTOLIC BLOOD PRESSURE: 86 MMHG | TEMPERATURE: 97.2 F | SYSTOLIC BLOOD PRESSURE: 146 MMHG | BODY MASS INDEX: 36.02 KG/M2 | HEIGHT: 64 IN | HEART RATE: 78 BPM

## 2022-03-02 VITALS — BODY MASS INDEX: 36.02 KG/M2 | HEIGHT: 64 IN | WEIGHT: 211 LBS

## 2022-03-02 DIAGNOSIS — K91.2 MALNUTRITION FOLLOWING GASTROINTESTINAL SURGERY: Primary | ICD-10-CM

## 2022-03-02 DIAGNOSIS — Z00.8 NUTRITIONAL ASSESSMENT: Primary | ICD-10-CM

## 2022-03-02 DIAGNOSIS — Z71.3 NUTRITIONAL COUNSELING: ICD-10-CM

## 2022-03-02 LAB — ZINC: 82.4 UG/DL (ref 60–120)

## 2022-03-02 PROCEDURE — G8417 CALC BMI ABV UP PARAM F/U: HCPCS | Performed by: SURGERY

## 2022-03-02 PROCEDURE — 1036F TOBACCO NON-USER: CPT | Performed by: SURGERY

## 2022-03-02 PROCEDURE — G8484 FLU IMMUNIZE NO ADMIN: HCPCS | Performed by: SURGERY

## 2022-03-02 PROCEDURE — 99212 OFFICE O/P EST SF 10 MIN: CPT

## 2022-03-02 PROCEDURE — G8427 DOCREV CUR MEDS BY ELIG CLIN: HCPCS | Performed by: SURGERY

## 2022-03-02 PROCEDURE — 99999 PR OFFICE/OUTPT VISIT,PROCEDURE ONLY: CPT | Performed by: SURGERY

## 2022-03-02 PROCEDURE — 99213 OFFICE O/P EST LOW 20 MIN: CPT | Performed by: SURGERY

## 2022-03-02 PROCEDURE — 3017F COLORECTAL CA SCREEN DOC REV: CPT | Performed by: SURGERY

## 2022-03-02 NOTE — PATIENT INSTRUCTIONS
amounts of hard, dry bowel movements, usually fewer than three times a week. People who are constipated may find it difficult and painful to have a bowel movement. Other symptoms of constipation include feeling bloated, uncomfortable, and sluggish. What Causes Constipation? To understand constipation, it helps to know how the colon (large intestine) works. As food moves through it, the colon absorbs water while forming waste products, or stool. Muscle contractions in the colon push the stool toward the rectum. By the time stool reaches the rectum, it is solid because most of the water has been absorbed. The hard and dry stools of constipation occur when the colon absorbs too much water. This happens because the colon's muscle contractions are slow or sluggish causing the stool to move through the colon too slowly causing too much water being able to be absorbed and a hard stool forming. Why Does This Happen After Bariatric Surgery? ? Most patients do not drink enough Water. That's right. You need to be drinking at the minimum 64 ounces of just plain water a day and additional 30 oz. of other non-caloric beverages. All other beverages do not count as water intake and will cause constipation. Adding water to the diet adds fluid to the colon and bulk to the stools, making bowel movements softer and easier to pass. ? Avoid all other beverages besides water especially coffee and tea when you are constipated. ? Most patients do not get enough fiber in the diet. We recommend at least 25  35 grams of fiber daily from your diet starting 3 months post-op. The most common cause of constipation is a diet low in fiber found in vegetables, fruits, and whole grains and high in fats found in cheese, eggs, and meats. People who eat plenty of high-fiber foods are less likely to become constipated.   Refer to your high fiber food's handout - this is based on what stage of the diet you may be in and what foods are tolerated at this stage. ? Lack of exercise can lead to constipation  Regular activity especially walking helps push food through the intestines and helps to push waste through the colon. Please be sure to be following your exercise guidelines. ? If constipated eliminate Cheese, Eggs, Applesauce, Apples, Bananas, Rice and Bread from the diet you can resume in small amounts once the constipation resolves and these foods have been incorporated into your diet stage. ? If you are 2 6 weeks post bariatric surgery you may add high fiber foods such as oatmeal, oat bran at this time. ? If your diet has progressed and you are 12 weeks post bariatric surgery you may add other high fiber foods such as fresh fruits, fresh vegetables, and whole grains. Track your fiber intake daily the goal is to have 25 - 35 grams of fiber total daily starting at your 3 month follow-up appointment. Remember half the fiber intake can come from your fiber supplement the other half should come from dietary intake. Refer to the High Fiber Foods Handout. Surgeon Bowel Instruction:    Your surgeon has instructed you on the following:    ? Start by following the [address] [place] [address] [facility] recommendations of taking a stool softener -  Colace or Docusate 100mg gel tablet once at breakfast and once before bed. The day you are discharged from the hospital until your 6 week follow-up appointment. ?  Start by adding a Sugar Free Chewable Fiber Supplement at your 2 week follow-up appointment lifelong -  Per the surgeons protocol you should be taking a fiber supplement lifelong since your two week follow-up appointment. Your surgeon recommends a daily approved Fiber supplement 15 grams total daily.   If you are just introducing a Fiber supplement the 15 grams should be introduced gradually - 5 grams of a fiber supplement daily the 1st week, 10 grams of a fiber supplement daily the 2nd week and 15 grams of a fiber supplement daily the third week. Pt is instructed to continue the 15 grams of a fiber supplement daily. Jeronimo Ld reviewed. Pt verbalized understanding. Select One from Below     o Vitafusion Sugar Free Fiber Well Chewable Fiber Gummies  Follow the [facility] instructions of taking 2 Fiber Gummies 3 times Daily. (15 grams)    -or-    o Benefiber Original  Follow the [facility] instructions of taking 1tablespoon 3 times daily mixed in water or sugar free beverage of choice. (13.5 grams)     -or-    o Metamucil Sugar Free Original  Follow the [facility] instruction of taking 1 rounded tablespoon 2 times daily mixed in water or sugar free beverage of choice. (18 grams)    ? You need to be drinking at the minimum 64 ounces of just plain water a day and an additional 30 oz. of other non-caloric, non-carbonated, non-caffeinated beverages. This will make a total of 90oz or greater daily. Unless you are on a fluid restriction. ? You can add a Probiotic. The [address] [place] [address] [facility]  recommends a Probiotic that contains 15 billion cfu's in one capsule and the first  ingredient needs to be Lactobacillus Acidophilus. The instructions are to take 1 capsule daily if you have no results increase to 2 capsules daily. ? Increase your activity and walk as tolerated daily. Follow your exercise guidelines. ? If you find that you are still having trouble with constipation after trying the  suggestions mentioned above please contact the [address] [place] [address] [facility] [phone].                                                                   [facility] Staff Stool Softener and Fiber Instruction Education for Patients:       D/C from Hospital 2-week F/U Appt: 6-week F/U Appt: 3 Month F/U Appt: 6 -Month - On   Water Intake 48  64 oz Water 64 oz of water plus 30 oz other SF / FF/ NC / CF Beverages 64 oz of water plus 30 oz other SF / FF/ NC / CF Beverages 64 oz of water plus 30 oz other SF / FF/ NC / CF Beverages 64 oz of water plus 30 oz other SF / FF/ NC / CF Beverages   Colace 100 mgs 2 times daily 100 mgs 2 times daily 100 mgs 2 times daily. Pt should be instructed to stop the Colace Pt should be off the Colace Pt should be off the Colace   Fiber Supplement   ____________ Add Fiber Supplement  Pt needs educated at 2 weeks Appt Continue Fiber Supplement -Continue Pt Education Continue Fiber Supplement -Continue Pt Education Continue Fiber Supplement -Continue Pt Education   Fiber Foods   ____________     ____________     ____________   Goal is 25  35 grams of Fiber Total Daily - Pt needs Educated Goal is 25  35 grams of Fiber Total Daily - Pt needs Educated                                     Vitamin and Supplement Instruction:  Vitamin and Mineral Supplementation   After Gastric Bypass and Sleeve Gastrectomy Surgery      Patient is able to verbalize the above supplements must be taken daily in order to insure proper health and nutrition, and prevent nutrient deficiencies. The patient is aware that not complying can lead to the patient placing him/her self at risk for complications. RD / LD reviewed with patient the importance of dietary supplements. Pt. verbalized understanding. Vitamins:   Bariatric Advantage Chewable Multi-Formula (1 tablet 2 times daily)  and  Bariatric Advantage Chewable Iron 29 mg (1 tablet daily)    Other Vitamin Deficiencies:    Vitamin D  Dry Vitamin D3 5,000iu every day. Calcium Supplements:  Calcium Supplement: Total daily intake should be 1500 mg from calcium citrate and 800  1000 iu Vitamin D daily. Plus three servings of low-fat dairy for a total daily intake of 1800 - 2200 mg of Calcium. Bariatric Advantage Calcium 500 Chews (1 tablet 3 times daily)  contains Calories    Pt. given copy.

## 2022-03-02 NOTE — PROGRESS NOTES
Medical Nutrition Therapy (MNT) Assessment   Pt is aware this phone call conversation will be documented and is in agreement to the documentation: Pt verbalized - Yes    Pt. Name: Holly Browning   Date:3/2/2022  F/U Appt: Sx Type 3 Month RYGB F/U Appointment     Ht 5' 4\" (1.626 m)   Wt 211 lb (95.7 kg)   BMI 36.22 kg/m²  Height: 5' 4\" (1.626 m) Weight: 211 lb (95.7 kg)   IBW:ideal body weight 152 lbs % EBWL: 37%     Wt Readings from Last 3 Encounters:   03/02/22 211 lb (95.7 kg)   03/02/22 211 lb (95.7 kg)   12/29/21 219 lb (99.3 kg)                     Protein supplements: Pt. is currently using the following protein supplement [place]. Rd / Ld reviewed with patient based on 1.0 gram per kg of IBW patient needs 70 - 80  grams of protein total daily.  Pt is having 6  shales a day pls her meals    Subjective:                   Current MNT:       Bariatric soft diet with MVI, Calcium & Protein Supplements     MNT Advanced to:     Bariatric soft diet introducing raw fruit, raw vegetables, rice, protein bars, multivitamin, calcium, protein supplements      Allergies and Food Allergies and Food Intolerances: None    Nutritional Data  No - Poor appetite more than 5 days     No - NPO or clear liquid more than 3 days     No - Problem Chewing      No - Problem Swallowing      No - Problem Mouth Pain      No - Problem Denture  No - Missing Teeth         No - Pressure Sore    No - Open Wound    No - Surgical Wound  No - Documented: Sepsis or Infection    No - Nausea  Yes - Vomiting - eating and drinking too soon     [facility] Bariatric Surgeons Bowel Protocol:  Patient states he / she has the following bowel movements per week - 7 times per week  no - Diarrhea  No - Steatorrhea  No - Constipation  When was your last bowel movement  - This morning    How much plain water are you drinking daily  - 4 to 5  - 16.9 oz bottles  What other beverages / fluids are you drinking daily and the amount - None    Yes - Are you taking Colace daily  What amount of Colace are you taking daily  -Colace 100 mgs once daily    No - Are you taking Sugar Free Chewable Fiber Gummies / Supplements  What amount of Sugar Free Chewable Fiber Gummies are you taking daily  Pt has benefiber see AVS pt was instructed on use. No - Did your surgeon discuss constipation with you? No - Did your surgeon discuss increasing water intake? How much water were you instructed to take daily? Jeronimo Arias reviewed today -  Patient was instructed by Jeronimo Arias on the importance of increasing water intake to 48 - 64 oz. of water total daily. Pt. was also instructed he / she is allowed an additional 30 oz. of sugar free caffeine free clear liquid beverages for a total of 90 oz. of fluid total daily. Pt. was able to verbalize how he / she can get more water and fluids within the diet. Pt. verbalized understanding. Yes - Did your surgeon discuss continuing Colace? How much Colace did your surgeon instruct you to take? Jeronimo Arias reviewed today - Per the surgeons protocol you should be taking since the day of your surgery Colace - 100 mgs 1 tablet 2 times daily until your 6 week F/U appointment. Jeronimo ARIAS reviewed. See After Visit Summary. No - Did your surgeon discuss stopping Colace? Yes - Did your surgeon discuss starting Fiber Gummies / Supplements? How much Fiber Gummies did your surgeon instruct you to take? Jeronimo Arias reviewed today Per the surgeons protocol you should be taking a fiber supplement lifelong since your two week follow-up appointment. Your surgeon recommends a daily approved Fiber supplement 15 grams total daily. If you are just introducing a Fiber supplement the 15 grams should be introduced 5 grams of a fiber supplement daily the 1st week, 10 grams of a fiber supplement daily the 2nd week and 15 grams of a fiber supplement daily the third week. Pt is instructed to continue the 15 grams of a fiber supplement daily. Jeronimo rAias reviewed. See After Visits Summary. Did your surgeon discuss any other medications / supplements to take daily to move your bowels and avoid constipation? Yes -  Patient was provided today the Constipation Handout - Jeronimo Khan reviewed Handout - Pt. verbalized understanding. See AVS  Yes Jeronimo Khan reinforced pt needs to consume the following - Water Intake should be 64 - 90 oz, Fiber Chews 15 grams, Dietary Fiber Intake 25 - 35 grams        Yes - Hair loss   No - Weight regain       No - Acid Reflux  No - Dumping Syndrome      No - Food gets stuck  Yes - Are you eating solids - should not be eating solids until 6 weeks post-op. not applicable - Night Time Coughing  not applicable -  B Ping Noted  Yes - Are you chewing thoroughly  - Does not take effect until 6 weeks post-op  No - Are you hungry after eating     How many meals a day  - 6 / Portions Sizes of Meals are  - 3 to 4 oz         Labs: 2/25/22 - Glucose 82, Alk Phos 111H, Tri 153H, Prealbumin 17L    Supplements: Bariatric Advantage Multi-Vitamin 1 tablet 2 times Daily, Bariatric Advantage 29 mgs Iron 1 tablet Daily, Bariatric Advanatge Calcium Lozenges 1 tablet 3 times Daily , Dry Vitamin D3 5,000iu every day    Estimated Daily Nutritional Needs: Based on Bariatric procedure for Wt. Loss  Energy: Will be calculated at Maintenance Stage    Protein: Average 60  80 gms Daily - See individual needs listed above based on IBW    MNT Plan and Additional Education: RD/HUGO reviewed Bariatric Soft Diet incorporating in Raw Fruits, Raw Vegetables, Red Meat, and Rice. Encouraged pt to meet protein and fluid needs daily. Handouts given. Pt. verbalized understanding. Pt instructed to call with questions. Pt was instructed by the Jeronimo Khan that she / he needs to keep daily food records and bring the food records to all f/u appointments.   Pt was instructed this is an important part of compliancy and long-term lifestyle changes and will help establish long-term weight loss and weight maintenance success after weight loss surgery. Jeronimo Khan reviewed with the pt how to keep track of protein intake along with calories, fat and sugar content. Pt needs to be able to see that he / she is meeting his / her macro nutrient needs daily. Jeronimo Khan reviewed different ways to keep foods records either manually or with computer apps. Pt was able to verbalize understanding. Failure to keep food records show poor compliancy following weight loss surgery. Pt has been given all the tools and education necessary to complete this task. Education spent with pt just on food records 20 minutes. Yes 1. Pt is consuming his / her recommended amount of protein within the diet based on patients Ideal Body Weight. .    Yes 2. Pt is using the recommended Bariatric approved protein supplement and taking in the correct amount of protein daily. Pt is able to verbalize how to mix his / her protein supplement correctly to meet pts needs. Pt verbalized understanding. Yes 3. Pt is using the recommended Bariatric approved Multi-Vitamin, Bariatric approved Calcium, Bariatric approved Iron supplement or Bariatric approved Vitamin D and taking the correct dose. Pt was educated per his / her Bariatric Surgeons protocol he / she needs the following daily -  Bariatric Advantage Multi-Vitamin 1 tablet 2 times Daily, Bariatric Advantage 29 mgs Iron 1 tablet Daily, Bariatric Advantage Calcium Lozenges 1 tablet 3 times Daily , Dry Vitamin D3 5,000iu every day. Pt verbalized understanding. No 4. Pt kept daily food records. Pt is aware food records need to be kept life-long daily and brought to all follow-up appointments in order to show compliancy after weight loss surgery. Pt verbalized understanding. Yes 5. Pt is taking the correct stool softener for the first 6 weeks with the correct dose. Pt is also taking the correct fiber supplement with the correct dose starting at his / her 2 week f/u appointment. Pt verbalized understanding.  See above instruction and AVS.     Yes 6. Pt is drinking 64 - 90 oz of plain water daily. Patient was instructed on the importance of increasing water intake to 48 - 64 oz. of water total daily. Pt. was also instructed he / she is allowed an additional 30 oz. of sugar free caffeine free clear liquid beverages for a total of 90 oz. of fluid total daily. Pt. was able to verbalize how he / she can get more water and fluids within the diet. Per bariatric surgeons protocol after weight loss surgery. Pt. verbalized understanding. Yes 7. Pt achieved his / her percentage of excess body weight loss at his / her f/u appointments and is on track to reach his / her weight loss goal.    Yes 8. Pt is weighing and measuring all foods using a food scale and measuring cups. Pt reports portion sizes are 3 to 4 oz in size. Portion sizes are not exceeding 6 ounces total per meal lifelong. Pt verbalized understanding. .     Yes 9. Pt is not experiencing Dumping Syndrome from food / beverage consumption. Yes 10. Pt is complying with all stages and consistencies of the bariatric diet and is not eating or drinking foods / beverages that is not listed on the diet at this stage. Yes (Staff FYI) - Pt is not drinking carbonated beverages, alcoholic beverages or juices at this time. Pt has had some juice  Compliancy Scale  - After Weight Loss Surgery :  9 Good - Dietary Compliance - This is based on patient following the Medical Nutrition Therapy protocol after Weight Loss Surgery  7 to 10 Points - Good (70% - to 100%) -  Pt is following what he / she has been instructed on at the time of this visit. 4 to 7 Points - Fair (40% to 70%) - Pt has areas that he / she needs to work on in order to be compliant with the bariatric protocol after weight loss surgery. 0 to 4 Points - Poor (0% - 40%) - Pt is failing to follow the instructions given to the pt. Jeronimo Khan has concerns pt may be creating harm.  Pt was offered additional dietary counseling appointments to help pt make the necessary lifestyle changes to show compliancy after weight loss surgery. MEDICAL NUTRITION THERAPY (MNT)  Nutrition Care Plan   (The patient has been educated and given written education material that reinforces the following dietary guidelines for Bariatric Surgery)  Goal:  Patient able to verbalize the following dietary concepts:  3 to 4 ounce portions per meal     6 small meals daily      60 to 80 grams of protein on average see individual protein needs   48 to 64 ounces of fluid daily (water)     Always consume protein item first with all meals   Pt is aware wt loss is pt controlled. Slow Meals  30-35 chews per bite / Meals 30  45 minutes long            The RD / LD reviewed with the patient that the dietary goals of the bariatric patient is to ensure that patient is able to meet established nutritional needs for a Bariatric Surgery Patient at this time in order to promote healing, prevent significant weight changes, prevent skin breakdown and abnormal lab values, prevent complications, and tolerance to diet and texture of foods. Pt is able to identify proper food choices and needed changes and is able to explain proper food preparation. RD / LD reviewed compliance with assigned diet stages, volume of food and drink consumed, timing of meals, amount of protein and energy intake, daily vitamin and mineral supplementation, identify food cravings and any adverse reactions associated with intake of food and drink. RD / LD uses behavioral tools such as goal setting, MI, and self-monitoring, to reinforce the anatomic and physiologic effects of the surgery, so that the described behaviors become more of a habit not simply a reaction to the altered anatomy.      Care Plan:   Yes - Rd/Ld Addressed Food Records or 24-Hour Recall  Yes - Rd / Ld encouraged patient to exercise at least 30 minutes daily  Yes - Rd / Ld instructed patient on how to increase oral protein intake within the diet. Pt. can verbalize his / her individual protein needs at this visit. Yes - Rd / Ld instructed the patient on how to increase the use of protein supplements within the diet if appropriate at this time. Yes - Pt. was instructed on how to increase water intake. Patient will need to consume 48 - 64 oz. of just plain water in addition to 30 oz. of non-caloric beverages. Yes - Handouts given to patient - Also see After Visit Summary in addition to paper copy diet instruction. Yes - RD / LD encouraged oral intake    Yes -  RD / LD encouraged pt. to keep food records daily.       Yes - Pt. is able to verbalize diet concepts      Yes - Constipation Handout Given and Reviewed - Part of surgeons Bowel Protocol - See After Visit Summary    Yes - High Fiber Handout Given and Reviewed - Part of surgeons Bowel Protocol - See After Visit Summary        No - Ulcer Handout Given and Reviewed          No - Pt. was instructed to continue current MNT   Yes - Pt. diet was advanced to the following stage ( See above)   No - Supplements: initiated (See list below  - Pt. given instruction)     No - Supplemental foods:______________________  No - Pt. was placed on a altered meal schedule

## 2022-03-02 NOTE — PATIENT INSTRUCTIONS
Please continue to take your vitamin and mineral supplements as instructed. If you received a blood work prescription today for laboratory monitoring due prior to your next routine follow-up visit, please have this blood work obtained 10 to 14 days prior to your next visit. It is important to fast for 12 hours prior to routine weight loss surgery blood work, EXCEPT for drinking water, to ensure accuracy of results. Please report nausea, vomiting, abdominal pain, or any other problems you experience to your surgeon. For problems related to weight loss surgery, it is best to go to [address] [place] [address] Emergency Department and have your surgeon paged.

## 2022-03-02 NOTE — PROGRESS NOTES
Barb Estrada Gary  3/2/2022  Laparoscopic Randy-en- Y Gastric Bypass   3 months Post-Operative Follow-up. Subjective:   Shadi Funes is a 49 y.o. female three months post Laparoscopic Randy-en-Y Gastric Bypass. She reports no issues. Reports no problems with eating, bowel movements, voiding, or the wounds. She is not having swallowing difficulty, is compliant most of the time with the multivitamins and calcium + Vit D. She is meeting fluid recommendations of at least 64 ounces per day and is meeting protein recommendations. Exercise: no regular exercise. Weight=211 lb (95.7 kg)  Today's weight represents a total weight loss to date of 35 pounds. Prior to Admission medications    Medication Sig Start Date End Date Taking?  Authorizing Provider   Calcium Citrate-Vitamin D (CALCIUM + VIT D, BARIATRIC ADVANTAGE, CHEWABLE TABLET) Take 1 tablet by mouth daily   Yes Historical Provider, MD   Multiple Vitamin (MVI, BARIATRIC ADVANTAGE VITABAND, CHEW TAB) Take 1 tablet by mouth daily   Yes Historical Provider, MD   Ferrous Sulfate (IRON PO) Take by mouth   Yes Historical Provider, MD   metFORMIN (GLUCOPHAGE) 500 MG tablet Take 1 tablet by mouth 2 times daily (with meals) 11/24/21  Yes Robert Walker MD   omeprazole (PRILOSEC) 20 MG delayed release capsule Take 1 capsule by mouth daily 11/10/21  Yes Nicolle Willis MD   ondansetron (ZOFRAN-ODT) 4 MG disintegrating tablet Place 1 tablet under the tongue every 8 hours as needed for Nausea or Vomiting 11/10/21  Yes Nicolle Willis MD   levothyroxine (SYNTHROID) 25 MCG tablet Take 25 mcg by mouth daily 2/3/21  Yes Historical Provider, MD   hydrOXYzine (VISTARIL) 50 MG capsule Take 50 mg by mouth 3 times daily  1/18/21  Yes Historical Provider, MD   LATUDA 60 MG TABS tablet Take 60 mg by mouth every evening 1/29/21  Yes Historical Provider, MD   traZODone (DESYREL) 100 MG tablet Take 100 tablets by mouth nightly as needed 1/29/21  Yes Historical Provider, MD   cyclobenzaprine (FLEXERIL) 10 MG tablet Take 10 mg by mouth daily   Yes Historical Provider, MD          Physical exam:   BP (!) 146/86 (Site: Right Lower Arm, Position: Sitting, Cuff Size: Medium Adult)   Pulse 78   Temp 97.2 °F (36.2 °C) (Temporal)   Resp 20   Ht 5' 4\" (1.626 m)   Wt 211 lb (95.7 kg)   BMI 36.22 kg/m²   General appearance: alert, appears stated age and cooperative  Head: Normocephalic, without obvious abnormality, atraumatic  Neck: no adenopathy, no carotid bruit, no JVD, supple, symmetrical, trachea midline and thyroid not enlarged, symmetric, no tenderness/mass/nodules  Lungs: clear to auscultation bilaterally  Heart: regular rate and rhythm, S1, S2 normal, no murmur, click, rub or gallop  Abdomen: soft, non-tender; bowel sounds normal; no masses,  no organomegaly  Extremities: extremities normal, atraumatic, no cyanosis or edema    Assessment: doing well 3 months post Laparoscopic Randy-en- Y Gastric Bypass. Plan:  Keep up good work. Continue eating a soft, high protein, low calorie diet. Eat small portions very slowly and chew well before swallowing. Drink plenty of water,  maintain adequate variety and balance. Try to exercise more, at least 30 minutes per day, 7 days per week. Follow up in 3 months and contact me if questions arise in the meantime. Start using fiber therapy such as Metamucil twice a day to prevent constipation. Bowels must move every day.       Physician Signature: Electronically signed by Dr. Deloris Roche MD

## 2022-03-02 NOTE — PROGRESS NOTES
Patient is 3 mo LRYGB. Water intake is around 80 oz daily. Protein through shakes and foods. Vitamin intake is good. Bowel movements are good. Scheduled with Serafin Correa for dietary.

## 2022-03-03 LAB — VITAMIN B1 WHOLE BLOOD: 106 NMOL/L (ref 70–180)

## 2022-05-12 DIAGNOSIS — K91.2 MALNUTRITION FOLLOWING GASTROINTESTINAL SURGERY: ICD-10-CM

## 2022-05-12 LAB
ALBUMIN SERPL-MCNC: 4.6 G/DL (ref 3.5–5.2)
ALP BLD-CCNC: 131 U/L (ref 35–104)
ALT SERPL-CCNC: 19 U/L (ref 0–32)
ANION GAP SERPL CALCULATED.3IONS-SCNC: 14 MMOL/L (ref 7–16)
AST SERPL-CCNC: 27 U/L (ref 0–31)
BILIRUB SERPL-MCNC: 0.5 MG/DL (ref 0–1.2)
BUN BLDV-MCNC: 10 MG/DL (ref 6–20)
CALCIUM SERPL-MCNC: 10.2 MG/DL (ref 8.6–10.2)
CHLORIDE BLD-SCNC: 104 MMOL/L (ref 98–107)
CHOLESTEROL, TOTAL: 194 MG/DL (ref 0–199)
CO2: 26 MMOL/L (ref 22–29)
CREAT SERPL-MCNC: 0.9 MG/DL (ref 0.5–1)
FERRITIN: 121 NG/ML
FOLATE: 9.9 NG/ML (ref 4.8–24.2)
GFR AFRICAN AMERICAN: >60
GFR NON-AFRICAN AMERICAN: >60 ML/MIN/1.73
GLUCOSE BLD-MCNC: 89 MG/DL (ref 74–99)
HCT VFR BLD CALC: 44.8 % (ref 34–48)
HEMOGLOBIN: 14.6 G/DL (ref 11.5–15.5)
MCH RBC QN AUTO: 30 PG (ref 26–35)
MCHC RBC AUTO-ENTMCNC: 32.6 % (ref 32–34.5)
MCV RBC AUTO: 92 FL (ref 80–99.9)
PDW BLD-RTO: 14.1 FL (ref 11.5–15)
PLATELET # BLD: 354 E9/L (ref 130–450)
PMV BLD AUTO: 9.8 FL (ref 7–12)
POTASSIUM SERPL-SCNC: 4.5 MMOL/L (ref 3.5–5)
PREALBUMIN: 22 MG/DL (ref 20–40)
RBC # BLD: 4.87 E12/L (ref 3.5–5.5)
SODIUM BLD-SCNC: 144 MMOL/L (ref 132–146)
TOTAL PROTEIN: 7.9 G/DL (ref 6.4–8.3)
TRIGL SERPL-MCNC: 203 MG/DL (ref 0–149)
VITAMIN B-12: 620 PG/ML (ref 211–946)
WBC # BLD: 9 E9/L (ref 4.5–11.5)

## 2022-05-15 LAB
COPPER: 110.4 UG/DL (ref 80–155)
SELENIUM: 123.5 UG/L (ref 23–190)
ZINC: 85.3 UG/DL (ref 60–120)

## 2022-05-18 ENCOUNTER — OFFICE VISIT (OUTPATIENT)
Dept: BARIATRICS/WEIGHT MGMT | Age: 52
End: 2022-05-18
Payer: COMMERCIAL

## 2022-05-18 ENCOUNTER — INITIAL CONSULT (OUTPATIENT)
Dept: BARIATRICS/WEIGHT MGMT | Age: 52
End: 2022-05-18

## 2022-05-18 VITALS — HEIGHT: 64 IN | WEIGHT: 201 LBS | BODY MASS INDEX: 34.31 KG/M2

## 2022-05-18 VITALS
RESPIRATION RATE: 20 BRPM | WEIGHT: 201 LBS | DIASTOLIC BLOOD PRESSURE: 87 MMHG | HEIGHT: 64 IN | BODY MASS INDEX: 34.31 KG/M2 | SYSTOLIC BLOOD PRESSURE: 125 MMHG | TEMPERATURE: 97.2 F | HEART RATE: 90 BPM

## 2022-05-18 DIAGNOSIS — K91.2 MALNUTRITION FOLLOWING GASTROINTESTINAL SURGERY: Primary | ICD-10-CM

## 2022-05-18 DIAGNOSIS — Z00.8 NUTRITIONAL ASSESSMENT: ICD-10-CM

## 2022-05-18 DIAGNOSIS — Z71.3 NUTRITIONAL COUNSELING: Primary | ICD-10-CM

## 2022-05-18 LAB — VITAMIN B1 WHOLE BLOOD: 132 NMOL/L (ref 70–180)

## 2022-05-18 PROCEDURE — G8427 DOCREV CUR MEDS BY ELIG CLIN: HCPCS | Performed by: SURGERY

## 2022-05-18 PROCEDURE — 1036F TOBACCO NON-USER: CPT | Performed by: SURGERY

## 2022-05-18 PROCEDURE — 99212 OFFICE O/P EST SF 10 MIN: CPT

## 2022-05-18 PROCEDURE — G8417 CALC BMI ABV UP PARAM F/U: HCPCS | Performed by: SURGERY

## 2022-05-18 PROCEDURE — 3017F COLORECTAL CA SCREEN DOC REV: CPT | Performed by: SURGERY

## 2022-05-18 PROCEDURE — 99999 PR OFFICE/OUTPT VISIT,PROCEDURE ONLY: CPT | Performed by: DIETITIAN, REGISTERED

## 2022-05-18 PROCEDURE — 99213 OFFICE O/P EST LOW 20 MIN: CPT | Performed by: SURGERY

## 2022-05-18 NOTE — PROGRESS NOTES
Barb Susanne Bernal  5/18/2022  Laparoscopic Randy-en- Y Gastric Bypass  6 months Post-Operative Follow-up. Subjective:   Elbert Nava is a 47 y.o. female six months post Laparoscopic Randy-en-Y Gastric Bypass. No issues  She is not having swallowing difficulty, is compliant most of the time with the multivitamins and calcium + Vit D. She is meeting fluid recommendations of at least 64 ounces per day and is meeting protein recommendations. Exercise: no regular exercise. Weight=201 lb (91.2 kg)  Today's weight represents a weight loss to date of 45 pounds. Allergies: Patient has no known allergies. Prior to Admission medications    Medication Sig Start Date End Date Taking?  Authorizing Provider   Calcium Citrate-Vitamin D (CALCIUM + VIT D, BARIATRIC ADVANTAGE, CHEWABLE TABLET) Take 1 tablet by mouth daily   Yes Historical Provider, MD   Multiple Vitamin (MVI, BARIATRIC ADVANTAGE VITABAND, CHEW TAB) Take 1 tablet by mouth daily   Yes Historical Provider, MD   Ferrous Sulfate (IRON PO) Take by mouth   Yes Historical Provider, MD   metFORMIN (GLUCOPHAGE) 500 MG tablet Take 1 tablet by mouth 2 times daily (with meals) 11/24/21  Yes Tung Marrero MD   omeprazole (PRILOSEC) 20 MG delayed release capsule Take 1 capsule by mouth daily 11/10/21  Yes Alexandria Moreno MD   ondansetron (ZOFRAN-ODT) 4 MG disintegrating tablet Place 1 tablet under the tongue every 8 hours as needed for Nausea or Vomiting 11/10/21  Yes Alexandria Moreno MD   levothyroxine (SYNTHROID) 25 MCG tablet Take 25 mcg by mouth daily 2/3/21  Yes Historical Provider, MD   hydrOXYzine (VISTARIL) 50 MG capsule Take 50 mg by mouth 3 times daily  1/18/21  Yes Historical Provider, MD   LATUDA 60 MG TABS tablet Take 60 mg by mouth every evening 1/29/21  Yes Historical Provider, MD   traZODone (DESYREL) 100 MG tablet Take 100 tablets by mouth nightly as needed 1/29/21  Yes Historical Provider, MD   cyclobenzaprine (FLEXERIL) 10 MG tablet Take 10 mg by mouth daily   Yes Historical Provider, MD           Physical exam:   /87 (Site: Left Lower Arm, Position: Sitting, Cuff Size: Medium Adult)   Pulse 90   Temp 97.2 °F (36.2 °C) (Temporal)   Resp 20   Ht 5' 4\" (1.626 m)   Wt 201 lb (91.2 kg)   BMI 34.50 kg/m²   General appearance: alert, appears stated age and cooperative  Head: Normocephalic, without obvious abnormality, atraumatic  Neck: no adenopathy, no carotid bruit, no JVD, supple, symmetrical, trachea midline and thyroid not enlarged, symmetric, no tenderness/mass/nodules  Lungs: clear to auscultation bilaterally  Heart: regular rate and rhythm  Abdomen: soft, non-tender; bowel sounds normal; no masses,  no organomegaly  Extremities: extremities normal, atraumatic, no cyanosis or edema    Assessment:  6 months post Laparoscopic Randy-en- Y Gastric Bypass. She does not complain of GERD,  does not have sleep apnea,  does not have diabetes,  does not have hypertension off medical treatment. Cholesterol and triglycerides are normal.    Plan:  Keep up good work. Continue to eat small portions very slowly and chew well before swallowing. High protein, low calorie diet. Drink plenty of water. Try to exercise more, maintain adequate variety and balance. Follow up in 6 months. Always call the clinic if you have any medical problems. Stop the Omeprazole or other acid reducing medicine if possible. If ulcer pain or acid reflux symptoms start, restart the Omeprazole and call the clinic.       Physician Signature: Electronically signed by Dr. Prince Morley MD

## 2022-05-18 NOTE — PATIENT INSTRUCTIONS
Please continue to take your vitamin and mineral supplements as instructed. If you received a blood work prescription today for laboratory monitoring due prior to your next routine follow-up visit, please have this blood work obtained 10 to 14 days prior to your next visit. It is important to fast for 12 hours prior to routine weight loss surgery blood work, EXCEPT for drinking water, to ensure accuracy of results. Please report nausea, vomiting, abdominal pain, or any other problems you experience to your surgeon. For problems related to weight loss surgery, it is best to go to [address] [place] [address] Emergency Department and have your surgeon paged.

## 2022-05-18 NOTE — PROGRESS NOTES
Patient is 6 mo LRYGB. Water intake is around 48 oz daily. Protein through shakes and foods. Vitamin intake is good. Bowel movements are good. Scheduled with Hawa Saravia for dietary.

## 2022-05-18 NOTE — PROGRESS NOTES
Medical Nutrition Therapy (MNT) Assessment   Pt is aware this phone call conversation will be documented and is in agreement to the documentation: Pt verbalized - Yes    Pt. Name: Joaquín Curran   Date:5/18/2022  F/U Appt: Sx Type 6 Month RYGB F/U Appointment      Ht 5' 4\" (1.626 m)   Wt 201 lb (91.2 kg)   BMI 34.50 kg/m²  Height: 5' 4\" (1.626 m) Weight: 201 lb (91.2 kg)   IBW:ideal body weight  152 lbs % EBWL: 47%     Wt Readings from Last 3 Encounters:   05/18/22 201 lb (91.2 kg)   05/18/22 201 lb (91.2 kg)   03/02/22 211 lb (95.7 kg)                     Protein supplements: Pt. is currently using the following protein supplement Nectar and consuming the following grams of protein 80. Rd / Ld reviewed with patient based on 1.0 gram per kg of IBW patient needs 69 - 79 grams of protein total daily. 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     Nutritional Data  No - Poor appetite more than 5 days     No - NPO or clear liquid more than 3 days     No - Problem Chewing      No - Problem Swallowing      No - Problem Mouth Pain      No - Problem Denture  No - Missing Teeth         No - Pressure Sore    No - Open Wound    No - Surgical Wound  No - Documented: Sepsis or Infection    No - Nausea  No - Vomiting    [facility] Bariatric Surgeons Bowel Protocol:  Patient states he / she has the following bowel movements per week - daily  no - Diarrhea  No - Steatorrhea  No - Constipation  When was your last bowel movement  - This morning    How much plain water are you drinking daily  - 5 to 6 - 16.9 oz bottles  What other beverages / fluids are you drinking daily and the amount  - None    Yes - Are you taking Colace daily  What amount of Colace are you taking daily  - Colace - 100 mgs - Once daily    Yes - Are you taking Sugar Free Chewable Fiber Gummies / Supplements  What amount of Sugar Free Chewable Fiber Gummies are you taking daily  - Benefiber - Once daily    How much water were you instructed to take daily? Jeronimo Arias reviewed today -  Patient was instructed by Jeronimo Arias on the importance of increasing water intake to 48 - 64 oz. of water total daily. Pt. was also instructed he / she is allowed an additional 30 oz. of sugar free caffeine free clear liquid beverages for a total of 90 oz. of fluid total daily. Pt. was able to verbalize how he / she can get more water and fluids within the diet. Pt. verbalized understanding. How much Colace did your surgeon instruct you to take? Jeronimo Arias reviewed today - Per the surgeons protocol you should be taking since the day of your surgery Colace - 100 mgs 1 tablet 2 times daily until your 6 week F/U appointment. Jeronimo ARIAS reviewed. See After Visit Summary. How much Fiber Gummies did your surgeon instruct you to take? Jeronimo Arias reviewed today Per the surgeons protocol you should be taking a fiber supplement lifelong since your two week follow-up appointment. Your surgeon recommends a daily approved Fiber supplement 15 grams total daily. If you are just introducing a Fiber supplement the 15 grams should be introduced 5 grams of a fiber supplement daily the 1st week, 10 grams of a fiber supplement daily the 2nd week and 15 grams of a fiber supplement daily the third week. Pt is instructed to continue the 15 grams of a fiber supplement daily. Jeronimo Arais reviewed. See After Visits Summary. Did your surgeon discuss any other medications / supplements to take daily to move your bowels and avoid constipation? N/A    Yes -  Patient was provided today the Constipation Handout - eJronimo Arias reviewed Handout - Pt. verbalized understanding.  See AVS    Yes Rd Ld reinforced pt needs to consume the following - Water Intake should be 64 - 90 oz, Fiber Chews 15 grams, Dietary Fiber Intake 25 - 35 grams        Yes - Hair loss   No - Weight regain       No - Acid Reflux  No - Dumping Syndrome      No - Food gets stuck  Yes - Are you eating solids - should not be eating solids until 6 weeks post-op. not applicable - Night Time Coughing  not applicable -  B Ping Noted  Yes - Are you chewing thoroughly  - Does not take effect until 6 weeks post-op  No - Are you hungry after eating     How many meals a day 6 / Portions Sizes of Meals are  - Palm of hand         Labs: 5/12/22 - Alk Phos 131H, Tr 203H    Supplements: Bariatric Advantage Multi-Vitamin 1 tablet 2 times Daily, Bariatric Advantage 29 mgs Iron 1 tablet Daily, Bariatric Advanatge Calcium Lozenges 1 tablet 3 times Daily , Dry Vitamin D3 5,000iu every day    Estimated Daily Nutritional Needs: Based on Bariatric procedure for Wt. Loss  Energy: Will be calculated at Maintenance Stage    Protein: Average 60  80 gms Daily - See individual needs listed above based on IBW    MNT Plan and Additional Education: RD/LD reviewed Bariatric Soft Diet incorporating in Raw Fruits, Raw Vegetables, Red Meat, and Rice. Encouraged pt to meet protein and fluid needs daily. Handouts given. Pt. verbalized understanding. Pt instructed to call with questions. .      Yes 1. Pt is consuming his / her recommended amount of protein within the diet based on patients Ideal Body Weight. .    Yes 2. Pt is using the recommended Bariatric approved protein supplement and taking in the correct amount of protein daily. Pt is able to verbalize how to mix his / her protein supplement correctly to meet pts needs. Pt verbalized understanding. Yes 3. Pt is using the recommended Bariatric approved Multi-Vitamin, Bariatric approved Calcium, Bariatric approved Iron supplement or Bariatric approved Vitamin D and taking the correct dose.  Pt was educated per his / her Bariatric Surgeons protocol he / she needs the following daily -  Bariatric Advantage Multi-Vitamin 1 tablet 2 times Daily, Bariatric Advantage 29 mgs Iron 1 tablet Daily, Bariatric Advantage Calcium Lozenges 1 tablet 3 times Daily , Dry Vitamin D3 5,000iu every day. Pt verbalized understanding. No 4. Pt kept daily food records. Pt is aware food records need to be kept life-long daily and brought to all follow-up appointments in order to show compliancy after weight loss surgery. Pt verbalized understanding. Yes 5. Pt is taking the correct stool softener for the first 6 weeks with the correct dose. Pt is also taking the correct fiber supplement with the correct dose starting at his / her 2 week f/u appointment. Pt verbalized understanding. See above instruction and AVS.     Yes 6. Pt is drinking 64 - 90 oz of plain water daily. Patient was instructed on the importance of increasing water intake to 48 - 64 oz. of water total daily. Pt. was also instructed he / she is allowed an additional 30 oz. of sugar free caffeine free clear liquid beverages for a total of 90 oz. of fluid total daily. Pt. was able to verbalize how he / she can get more water and fluids within the diet. Per bariatric surgeons protocol after weight loss surgery. Pt. verbalized understanding. Yes 7. Pt achieved his / her percentage of excess body weight loss at his / her f/u appointments and is on track to reach his / her weight loss goal.    Yes 8. Pt is weighing and measuring all foods using a food scale and measuring cups. Pt reports portion sizes are 3 to 4 oz in size. Portion sizes are not exceeding 6 ounces total per meal lifelong. Pt verbalized understanding. .     Yes 9. Pt is not experiencing Dumping Syndrome from food / beverage consumption. No 10.  Pt is complying with all stages and consistencies of the bariatric diet and is not eating or drinking foods / beverages that is not listed on the needed changes and is able to explain proper food preparation. RD / LD reviewed compliance with assigned diet stages, volume of food and drink consumed, timing of meals, amount of protein and energy intake, daily vitamin and mineral supplementation, identify food cravings and any adverse reactions associated with intake of food and drink. RD / LD uses behavioral tools such as goal setting, MI, and self-monitoring, to reinforce the anatomic and physiologic effects of the surgery, so that the described behaviors become more of a habit not simply a reaction to the altered anatomy. Care Plan:   Yes - Rd/Ld Addressed Food Records or 24-Hour Recall  Yes - Rd / Ld encouraged patient to exercise at least 30 minutes daily  Yes - Rd / Ld instructed patient on how to increase oral protein intake within the diet. Pt. can verbalize his / her individual protein needs at this visit. Yes - Rd / Ld instructed the patient on how to increase the use of protein supplements within the diet if appropriate at this time. Yes - Pt. was instructed on how to increase water intake. Patient will need to consume 48 - 64 oz. of just plain water in addition to 30 oz. of non-caloric beverages. Yes - Handouts given to patient - Also see After Visit Summary in addition to paper copy diet instruction. Yes - RD / LD encouraged oral intake    Yes -  RD / LD encouraged pt. to keep food records daily.       Yes - Pt. is able to verbalize diet concepts      Yes - Constipation Handout Given and Reviewed - Part of surgeons Bowel Protocol - See After Visit Summary    Yes - High Fiber Handout Given and Reviewed - Part of surgeons Bowel Protocol - See After Visit Summary        No - Ulcer Handout Given and Reviewed          No - Pt. was instructed to continue current MNT   Yes - Pt. diet was advanced to the following stage ( See above)   No - Supplements: initiated (See list below  - Pt. given instruction)     No - Supplemental foods:______________________  No - Pt. was placed on a altered meal schedule

## 2022-11-09 DIAGNOSIS — K91.2 MALNUTRITION FOLLOWING GASTROINTESTINAL SURGERY: ICD-10-CM

## 2022-11-09 LAB
ALBUMIN SERPL-MCNC: 4 G/DL (ref 3.5–5.2)
ALP BLD-CCNC: 119 U/L (ref 35–104)
ALT SERPL-CCNC: 13 U/L (ref 0–32)
ANION GAP SERPL CALCULATED.3IONS-SCNC: 14 MMOL/L (ref 7–16)
AST SERPL-CCNC: 17 U/L (ref 0–31)
BILIRUB SERPL-MCNC: 0.4 MG/DL (ref 0–1.2)
BUN BLDV-MCNC: 10 MG/DL (ref 6–20)
CALCIUM SERPL-MCNC: 9.9 MG/DL (ref 8.6–10.2)
CHLORIDE BLD-SCNC: 106 MMOL/L (ref 98–107)
CHOLESTEROL, TOTAL: 182 MG/DL (ref 0–199)
CO2: 26 MMOL/L (ref 22–29)
CREAT SERPL-MCNC: 0.8 MG/DL (ref 0.5–1)
FERRITIN: 73 NG/ML
FOLATE: 6.5 NG/ML (ref 4.8–24.2)
GFR SERPL CREATININE-BSD FRML MDRD: >60 ML/MIN/1.73
GLUCOSE BLD-MCNC: 82 MG/DL (ref 74–99)
HCT VFR BLD CALC: 41 % (ref 34–48)
HEMOGLOBIN: 13.6 G/DL (ref 11.5–15.5)
MCH RBC QN AUTO: 30.8 PG (ref 26–35)
MCHC RBC AUTO-ENTMCNC: 33.2 % (ref 32–34.5)
MCV RBC AUTO: 93 FL (ref 80–99.9)
PDW BLD-RTO: 13.9 FL (ref 11.5–15)
PLATELET # BLD: 288 E9/L (ref 130–450)
PMV BLD AUTO: 9.2 FL (ref 7–12)
POTASSIUM SERPL-SCNC: 5.2 MMOL/L (ref 3.5–5)
PREALBUMIN: 21 MG/DL (ref 20–40)
RBC # BLD: 4.41 E12/L (ref 3.5–5.5)
SODIUM BLD-SCNC: 146 MMOL/L (ref 132–146)
TOTAL PROTEIN: 7.1 G/DL (ref 6.4–8.3)
TRIGL SERPL-MCNC: 164 MG/DL (ref 0–149)
VITAMIN B-12: 379 PG/ML (ref 211–946)
VITAMIN D 25-HYDROXY: 29 NG/ML (ref 30–100)
WBC # BLD: 7.9 E9/L (ref 4.5–11.5)

## 2022-11-12 LAB
COPPER: 105 UG/DL (ref 80–155)
SELENIUM: 96.7 UG/L (ref 23–190)
ZINC: 62.6 UG/DL (ref 60–120)

## 2022-11-15 LAB — VITAMIN B1 WHOLE BLOOD: 114 NMOL/L (ref 70–180)

## 2022-11-16 ENCOUNTER — OFFICE VISIT (OUTPATIENT)
Dept: BARIATRICS/WEIGHT MGMT | Age: 52
End: 2022-11-16
Payer: COMMERCIAL

## 2022-11-16 VITALS
DIASTOLIC BLOOD PRESSURE: 26 MMHG | BODY MASS INDEX: 37.05 KG/M2 | SYSTOLIC BLOOD PRESSURE: 108 MMHG | WEIGHT: 217 LBS | TEMPERATURE: 97.7 F | HEART RATE: 84 BPM | RESPIRATION RATE: 20 BRPM | HEIGHT: 64 IN

## 2022-11-16 DIAGNOSIS — K91.2 MALNUTRITION FOLLOWING GASTROINTESTINAL SURGERY: Primary | ICD-10-CM

## 2022-11-16 PROCEDURE — G8427 DOCREV CUR MEDS BY ELIG CLIN: HCPCS | Performed by: SURGERY

## 2022-11-16 PROCEDURE — 99213 OFFICE O/P EST LOW 20 MIN: CPT | Performed by: SURGERY

## 2022-11-16 PROCEDURE — 3017F COLORECTAL CA SCREEN DOC REV: CPT | Performed by: SURGERY

## 2022-11-16 PROCEDURE — 3078F DIAST BP <80 MM HG: CPT | Performed by: SURGERY

## 2022-11-16 PROCEDURE — 99211 OFF/OP EST MAY X REQ PHY/QHP: CPT

## 2022-11-16 PROCEDURE — G8484 FLU IMMUNIZE NO ADMIN: HCPCS | Performed by: SURGERY

## 2022-11-16 PROCEDURE — G8417 CALC BMI ABV UP PARAM F/U: HCPCS | Performed by: SURGERY

## 2022-11-16 PROCEDURE — 1036F TOBACCO NON-USER: CPT | Performed by: SURGERY

## 2022-11-16 PROCEDURE — 3074F SYST BP LT 130 MM HG: CPT | Performed by: SURGERY

## 2022-11-16 NOTE — PATIENT INSTRUCTIONS
Please continue to take your vitamin and mineral supplements as instructed. If you received a blood work prescription today for laboratory monitoring due prior to your next routine follow-up visit, please have this blood work obtained 10 to 14 days prior to your next visit. It is important to fast for 12 hours prior to routine weight loss surgery blood work, EXCEPT for drinking water, to ensure accuracy of results. Please report nausea, vomiting, abdominal pain, or any other problems you experience to your surgeon. For problems related to weight loss surgery, it is best to go to [address] [place] [address] Emergency Department and have your surgeon paged.

## 2022-11-16 NOTE — PROGRESS NOTES
Patient is 1 yr LRYGB. Water intake is around 96 oz daily. Protein through shakes and foods. Vitamin intake is good. Bowel movements - constipation.     Second BP noted left upper arm  106/66

## 2022-11-16 NOTE — PROGRESS NOTES
Danette Carey  11/16/2022  [address]     Randy-en- Y Gastric Bypass  1 Year Post-Operative Follow-up     Danette Carey is a 47 y.o. female who is 1 years post Laparoscopic Randy-en-Y Gastric Bypass surgery. Reports no issues but had deaths in family and went off diet and is upset she gained small amount of weight. She is not having swallowing difficulty, is compliant most of the time with the multivitamins and calcium + Vit D. She is meeting fluid recommendations of at least 64 ounces per day and is meeting protein recommendations. She  is not exercising: no regular exercise. Weight=217 lb (98.4 kg)  Today's weight represents a total weight loss of 29 pounds since surgery with 16 pounds regained since the lowest weight. Prior to Admission medications    Medication Sig Start Date End Date Taking?  Authorizing Provider   Calcium Citrate-Vitamin D (CALCIUM + VIT D, BARIATRIC ADVANTAGE, CHEWABLE TABLET) Take 1 tablet by mouth daily   Yes Historical Provider, MD   Multiple Vitamin (MVI, BARIATRIC ADVANTAGE VITABAND, CHEW TAB) Take 1 tablet by mouth daily   Yes Historical Provider, MD   Ferrous Sulfate (IRON PO) Take by mouth   Yes Historical Provider, MD   metFORMIN (GLUCOPHAGE) 500 MG tablet Take 1 tablet by mouth 2 times daily (with meals) 11/24/21  Yes Mingo Bob MD   omeprazole (PRILOSEC) 20 MG delayed release capsule Take 1 capsule by mouth daily 11/10/21  Yes Tammie Valerio MD   ondansetron (ZOFRAN-ODT) 4 MG disintegrating tablet Place 1 tablet under the tongue every 8 hours as needed for Nausea or Vomiting 11/10/21  Yes Tammie Valerio MD   levothyroxine (SYNTHROID) 25 MCG tablet Take 25 mcg by mouth daily 2/3/21  Yes Historical Provider, MD   hydrOXYzine (VISTARIL) 50 MG capsule Take 50 mg by mouth 3 times daily  1/18/21  Yes Historical Provider, MD   LATUDA 60 MG TABS tablet Take 60 mg by mouth every evening 1/29/21  Yes Historical Provider, MD   traZODone (DESYREL) 100 MG tablet Take 100 tablets by mouth nightly as needed 1/29/21  Yes Historical Provider, MD   cyclobenzaprine (FLEXERIL) 10 MG tablet Take 10 mg by mouth daily   Yes Historical Provider, MD          Physical exam:   BP (!) 108/26 (Site: Left Lower Arm, Position: Sitting, Cuff Size: Medium Adult)   Pulse 84   Temp 97.7 °F (36.5 °C) (Temporal)   Resp 20   Ht 5' 4\" (1.626 m)   Wt 217 lb (98.4 kg)   BMI 37.25 kg/m²    General appearance: alert, appears stated age and cooperative  Head: Normocephalic, without obvious abnormality, atraumatic  Neck: no adenopathy, no carotid bruit, no JVD, supple, symmetrical, trachea midline and thyroid not enlarged, symmetric, no tenderness/mass/nodules  Lungs: clear to auscultation bilaterally  Heart: regular rate and rhythm  Abdomen: soft, non-tender; bowel sounds normal; no masses,  no organomegaly  Extremities: extremities normal, atraumatic, no cyanosis or edema    Assessment: Post Randy-en- Y Gastric Bypass. She does not complain of GERD,  does not have sleep apnea,  does have diabetes,  does not have hypertension off medical treatment. Cholesterol and triglycerides are normal. Malnutrition/hypoalbuminemia    Plan:  Get back on diet. Continue to eat a high protein, low calorie diet, eat small portions very slowly and chew well before swallowing. Drink plenty of water and fluids. Make sure to use fiber to keep the bowels regular. Try to exercise 7 days per week, maintain adequate variety and balance. Always notify the clinic if you have any medical problems. Follow up in 6 months.  REORDER several LAB panels TO FOLLOW NEXT VISIT      Physician Signature: Electronically signed by Dr. Derick Finley MD

## 2023-05-22 ENCOUNTER — OFFICE VISIT (OUTPATIENT)
Dept: BARIATRICS/WEIGHT MGMT | Age: 53
End: 2023-05-22
Payer: COMMERCIAL

## 2023-05-22 VITALS
RESPIRATION RATE: 20 BRPM | BODY MASS INDEX: 37.22 KG/M2 | HEART RATE: 85 BPM | TEMPERATURE: 98.2 F | SYSTOLIC BLOOD PRESSURE: 127 MMHG | DIASTOLIC BLOOD PRESSURE: 80 MMHG | HEIGHT: 64 IN | WEIGHT: 218 LBS

## 2023-05-22 DIAGNOSIS — E44.1 MILD PROTEIN-CALORIE MALNUTRITION (HCC): Primary | ICD-10-CM

## 2023-05-22 PROCEDURE — G8417 CALC BMI ABV UP PARAM F/U: HCPCS | Performed by: NURSE PRACTITIONER

## 2023-05-22 PROCEDURE — 99213 OFFICE O/P EST LOW 20 MIN: CPT | Performed by: NURSE PRACTITIONER

## 2023-05-22 PROCEDURE — G8427 DOCREV CUR MEDS BY ELIG CLIN: HCPCS | Performed by: NURSE PRACTITIONER

## 2023-05-22 PROCEDURE — 1036F TOBACCO NON-USER: CPT | Performed by: NURSE PRACTITIONER

## 2023-05-22 PROCEDURE — 3079F DIAST BP 80-89 MM HG: CPT | Performed by: NURSE PRACTITIONER

## 2023-05-22 PROCEDURE — 3017F COLORECTAL CA SCREEN DOC REV: CPT | Performed by: NURSE PRACTITIONER

## 2023-05-22 PROCEDURE — 3074F SYST BP LT 130 MM HG: CPT | Performed by: NURSE PRACTITIONER

## 2023-05-22 PROCEDURE — 99211 OFF/OP EST MAY X REQ PHY/QHP: CPT

## 2023-05-22 RX ORDER — BUPROPION HYDROCHLORIDE 75 MG/1
75 TABLET ORAL 2 TIMES DAILY
COMMUNITY

## 2023-05-22 RX ORDER — BUSPIRONE HYDROCHLORIDE 10 MG/1
10 TABLET ORAL 2 TIMES DAILY
COMMUNITY

## 2023-05-22 NOTE — PROGRESS NOTES
Debbie Estrada  5/22/2023  [address]     Randy-en- Y Gastric Bypass  18 months Post-Operative Follow-up     Chief Complaint   Patient presents with    Follow Up After Procedure     18 mo LRYGB. Water intake is around 64 oz daily. Protein through mostly foods. Vitamin intake is good. Bowel movements are good. Debbie Estrada is a 47 y.o. female who is 18 months  post Laparoscopic Randy-en-Y Gastric Bypass surgery. Reports that she feels like she should have lost more weight than she did. She is not having swallowing difficulty. Patient denies nausea and vomiting. Patient denies gastric reflux symptoms. Bowel movements are  normal per patient. Patient is  taking fiber supplements, which include benefiber. Patient is compliant most of the time with the iron supplement and multivitamins and calcium + Vit D. She is meeting fluid recommendations of at least 64 ounces per day and is meeting protein recommendations. She is doing shakes occasionally. She  is exercising:  walking 20 minutes daily . Tmrczi=327 lb (98.9 kg)  Today's weight represents a total weight loss of 17 pounds since surgery with 17 pounds regained since the lowest weight. She reports that she still has restriction with eating. She is snacking on sugar free pudding. She is not keeping track of her caloric intake. She has used a book for this in the past.     Prior to Admission medications    Medication Sig Start Date End Date Taking? Authorizing Provider   busPIRone (BUSPAR) 10 MG tablet Take 1 tablet by mouth in the morning and 1 tablet in the evening.    Yes Historical Provider, MD   buPROPion (WELLBUTRIN) 75 MG tablet Take 1 tablet by mouth 2 times daily   Yes Historical Provider, MD   Calcium Citrate-Vitamin D (CALCIUM + VIT D, BARIATRIC ADVANTAGE, CHEWABLE TABLET) Take 1 tablet by mouth daily   Yes Historical Provider, MD   Multiple Vitamin (MVI, BARIATRIC ADVANTAGE VITABAND, CHEW TAB) Take 1 tablet by mouth

## 2023-05-22 NOTE — PATIENT INSTRUCTIONS
Bring a food diary with you to the next OV. Please continue to take your vitamin and mineral supplements as instructed. If you received a blood work prescription today for laboratory monitoring due prior to your next routine follow-up visit, please have this blood work obtained 10 to 14 days prior to your next visit. It is important to fast for 12 hours prior to routine weight loss surgery blood work, EXCEPT for drinking water, to ensure accuracy of results. Please report nausea, vomiting, abdominal pain, or any other problems you experience to your surgeon. For problems related to weight loss surgery, it is best to go to [address] [place] [address] Emergency Department and have your surgeon paged.

## 2023-06-06 ENCOUNTER — INITIAL CONSULT (OUTPATIENT)
Dept: BARIATRICS/WEIGHT MGMT | Age: 53
End: 2023-06-06

## 2023-06-06 VITALS — BODY MASS INDEX: 36.02 KG/M2 | HEIGHT: 64 IN | WEIGHT: 211 LBS

## 2023-06-06 DIAGNOSIS — Z71.3 DIETARY COUNSELING: Primary | ICD-10-CM

## 2023-06-06 PROCEDURE — 99999 PR OFFICE/OUTPT VISIT,PROCEDURE ONLY: CPT

## 2023-06-06 NOTE — PROGRESS NOTES
Weight:  211 lbs  Height:  5' 4\"    Pt in for weight loss consult due to noncompliance with her plan. Pt is eating meals that are larger than 3-4 oz, reporting she also eats just one meal daily. She is noncompliant with most elements of her plan. Instructed pt to follow post op bariatric diet fundaments, as follows: Follow a low-fat, no-added-sugar diet, eating 6 small meals daily, each meal 1/2 cup or less in total volume, meet daily protein goal of 69-79 grams (use protein supplement if needed to meet goal), drink 64 oz (or more) of water daily, and do not drink liquids with meals but wait 30 minutes after meal to drink liquids. Pt is also to take all approved bariatric vitamin & mineral supplements. Pt has a copy of the Patient Guide to bariatric surgery. Advised pt to follow the diet information closely and to consider following the pouch reset plan. Pt is considering doing so. Pt already did make some changes to her daily intake and lost 7 lbs in approx 2 weeks. Pt is still somewhat restricted when eating, however, eats 1.5 cups at meals, plus drinks liquids with her meals. Pt is only eating once daily. She does have a protein shake daily, however, is not meeting protein needs. Pt is meting water goal.  Pt takes all vit/mineral supplements (Bariatric Advantage brand). Suggested using a protein supplement much like after surgery to take 4 oz 3 times daily for 3 meals and to add 2 additional meals for six total, and to reduce meals to 1/2 cup or less. Pt voiced understanding of benefit, but, did not commit to doing so today. Discussed the pouch reset in detail. Plan / goals  Follow a low-fat, no-added-sugar diet, eating 6 small meals daily, each meal 1/2 cup or less in total volume, meet daily protein goal of 69-79 grams (use protein supplement if needed to meet goal), drink 64 oz (or more) of water daily, and do not drink liquids with meals but wait 30 minutes after meal to drink liquids.

## 2023-06-06 NOTE — PATIENT INSTRUCTIONS
Plan / goals  Follow a low-fat, no-added-sugar diet, eating 6 small meals daily, each meal 1/2 cup or less in total volume, meet daily protein goal of 69-79 grams (use protein supplement if needed to meet goal), drink 64 oz (or more) of water daily, and do not drink liquids with meals but wait 30 minutes after meal to drink liquids. Pt is also to take all approved bariatric vitamin & mineral supplements. Consider the pouch reset plan in the Patient Guide to Bariatric Surgery  Begin eating six meals daily. Protein shakes can be used, or small one food meals are always fine (fruit, veg, meat snacks, yogurt, etc  Follow up in 6 weeks.

## 2023-07-18 ENCOUNTER — INITIAL CONSULT (OUTPATIENT)
Dept: BARIATRICS/WEIGHT MGMT | Age: 53
End: 2023-07-18

## 2023-07-18 VITALS — WEIGHT: 206 LBS | HEIGHT: 64 IN | BODY MASS INDEX: 35.17 KG/M2

## 2023-07-18 DIAGNOSIS — Z71.3 DIETARY COUNSELING: Primary | ICD-10-CM

## 2023-07-18 PROCEDURE — 99999 PR OFFICE/OUTPT VISIT,PROCEDURE ONLY: CPT | Performed by: DIETITIAN, REGISTERED

## 2023-07-18 NOTE — PROGRESS NOTES
Weight:  206 lbs  Height:  5' 4\"     Pt in for follow up with getting back on track with diet plan. Pt is now eating meals that are smaller in size and she is eating more often. (Pt had been eating just once daily). She is now compliant with most elements of her plan. One area that is difficult for pt is not drinking with her food intake. Reminded pt to follow post op bariatric diet fundaments, as follows: Follow a low-fat, no-added-sugar diet, eating 6 small meals daily, each meal 1/2 cup or less in total volume, meet daily protein goal of 69-79 grams (use protein supplement if needed to meet goal), drink 64 oz (or more) of water daily, and do not drink liquids with meals but wait 30 minutes after meal to drink liquids. Pt is also to take all approved bariatric vitamin & mineral supplements. Pt has a copy of the Patient Guide to bariatric surgery. Advised pt to follow the diet information closely. Pt reported that now that she is eating smaller meals and more often, she is satisfied doing so and plans to continue this pattern. Pt is meting water goal.  Pt takes all vit/mineral supplements (Bariatric Advantage brand). Pt is using a protein supplement - Premier Protein. Though this not approved, pt plans to continue it. Suggested Fairlife protein supplement an approved ready-to-drink supplement. Congratulated pt on her improved compliance with her diet plan and on losing 5 lbs in past 6 weeks. Plan / goals  Follow a low-fat, no-added-sugar diet, eating 6 small meals daily, each meal 1/2 cup or less in total volume, meet daily protein goal of 69-79 grams (use protein supplement if needed to meet goal), drink 64 oz (or more) of water daily, and do not drink liquids with meals but wait 30 minutes after meal to drink liquids. Pt is also to take all approved bariatric vitamin & mineral supplements. Consider the pouch reset plan - only if needed.     Keep up the great work!!  Continue eating six

## 2023-07-18 NOTE — PATIENT INSTRUCTIONS
Plan / goals  Follow a low-fat, no-added-sugar diet, eating 6 small meals daily, each meal 1/2 cup or less in total volume, meet daily protein goal of 69-79 grams (use protein supplement if needed to meet goal), drink 64 oz (or more) of water daily, and do not drink liquids with meals but wait 30 minutes after meal to drink liquids. Pt is also to take all approved bariatric vitamin & mineral supplements. Consider the pouch reset plan - only if needed. Keep up the great work!!  Continue eating six meals daily. Follow up in 8 weeks.

## 2023-10-05 DIAGNOSIS — E44.1 MILD PROTEIN-CALORIE MALNUTRITION (HCC): ICD-10-CM

## 2023-10-05 LAB
ALBUMIN SERPL-MCNC: 4.4 G/DL (ref 3.5–5.2)
ALP BLD-CCNC: 116 U/L (ref 35–104)
ALT SERPL-CCNC: 9 U/L (ref 0–32)
ANION GAP SERPL CALCULATED.3IONS-SCNC: 18 MMOL/L (ref 7–16)
AST SERPL-CCNC: 19 U/L (ref 0–31)
BILIRUB SERPL-MCNC: 0.4 MG/DL (ref 0–1.2)
BUN BLDV-MCNC: 13 MG/DL (ref 6–20)
CALCIUM SERPL-MCNC: 9.9 MG/DL (ref 8.6–10.2)
CHLORIDE BLD-SCNC: 102 MMOL/L (ref 98–107)
CHOLESTEROL: 193 MG/DL
CO2: 20 MMOL/L (ref 22–29)
CREAT SERPL-MCNC: 0.7 MG/DL (ref 0.5–1)
FERRITIN: 84 NG/ML
FOLATE: 6.4 NG/ML (ref 4.8–24.2)
GFR SERPL CREATININE-BSD FRML MDRD: >60 ML/MIN/1.73M2
GLUCOSE BLD-MCNC: 78 MG/DL (ref 74–99)
HCT VFR BLD CALC: 43.5 % (ref 34–48)
HDLC SERPL-MCNC: 44 MG/DL
HEMOGLOBIN: 13.8 G/DL (ref 11.5–15.5)
LDL CHOLESTEROL: 115 MG/DL
MCH RBC QN AUTO: 30 PG (ref 26–35)
MCHC RBC AUTO-ENTMCNC: 31.7 G/DL (ref 32–34.5)
MCV RBC AUTO: 94.6 FL (ref 80–99.9)
PDW BLD-RTO: 13.6 % (ref 11.5–15)
PLATELET # BLD: 342 K/UL (ref 130–450)
PMV BLD AUTO: 9.4 FL (ref 7–12)
POTASSIUM SERPL-SCNC: 4.8 MMOL/L (ref 3.5–5)
PREALBUMIN: 23 MG/DL (ref 20–40)
RBC # BLD: 4.6 M/UL (ref 3.5–5.5)
SODIUM BLD-SCNC: 140 MMOL/L (ref 132–146)
TOTAL PROTEIN: 7.6 G/DL (ref 6.4–8.3)
TRIGL SERPL-MCNC: 145 MG/DL
VITAMIN B-12: 371 PG/ML (ref 211–946)
VITAMIN D 25-HYDROXY: 28.5 NG/ML (ref 30–100)
VLDLC SERPL CALC-MCNC: 30 MG/DL
WBC # BLD: 10.8 K/UL (ref 4.5–11.5)

## 2023-10-09 LAB
COPPER: 124.9 UG/DL (ref 80–155)
ZINC: 81 UG/DL (ref 60–120)

## 2023-10-10 ENCOUNTER — INITIAL CONSULT (OUTPATIENT)
Dept: BARIATRICS/WEIGHT MGMT | Age: 53
End: 2023-10-10
Payer: COMMERCIAL

## 2023-10-10 VITALS — HEIGHT: 64 IN | WEIGHT: 201 LBS | BODY MASS INDEX: 34.31 KG/M2

## 2023-10-10 DIAGNOSIS — Z71.3 DIETARY COUNSELING: Primary | ICD-10-CM

## 2023-10-10 DIAGNOSIS — E66.09 CLASS 1 OBESITY DUE TO EXCESS CALORIES WITH BODY MASS INDEX (BMI) OF 34.0 TO 34.9 IN ADULT, UNSPECIFIED WHETHER SERIOUS COMORBIDITY PRESENT: ICD-10-CM

## 2023-10-10 LAB
RETINYL PALMITATE: <0.02 MG/L (ref 0–0.1)
VITAMIN A LEVEL: 0.63 MG/L (ref 0.3–1.2)
VITAMIN A, INTERP: NORMAL
VITAMIN B1 WHOLE BLOOD: 136 NMOL/L (ref 70–180)

## 2023-10-10 PROCEDURE — G8417 CALC BMI ABV UP PARAM F/U: HCPCS | Performed by: DIETITIAN, REGISTERED

## 2023-10-10 PROCEDURE — 97803 MED NUTRITION INDIV SUBSEQ: CPT | Performed by: DIETITIAN, REGISTERED

## 2023-10-10 PROCEDURE — G8428 CUR MEDS NOT DOCUMENT: HCPCS | Performed by: DIETITIAN, REGISTERED

## 2023-10-10 NOTE — PATIENT INSTRUCTIONS
Plan / goals  Follow a low-fat, no-added-sugar diet, eating 6 small meals daily, each meal 1/2 cup or less in total volume, meet daily protein goal of 69-79 grams (use protein supplement if needed to meet goal), drink 64 oz (or more) of water daily, and do not drink liquids with meals but wait 30 minutes after meal to drink liquids. Pt is also to take all approved bariatric vitamin & mineral supplements. Consider the pouch reset plan - only if needed. Keep up the great work!!  Continue eating six meals daily and be careful to make low fat and no-added-sugar food choices. Follow up in 12 weeks.

## 2023-11-13 ENCOUNTER — OFFICE VISIT (OUTPATIENT)
Dept: BARIATRICS/WEIGHT MGMT | Age: 53
End: 2023-11-13
Payer: COMMERCIAL

## 2023-11-13 VITALS
WEIGHT: 198 LBS | HEIGHT: 64 IN | SYSTOLIC BLOOD PRESSURE: 136 MMHG | TEMPERATURE: 97.7 F | DIASTOLIC BLOOD PRESSURE: 79 MMHG | HEART RATE: 87 BPM | RESPIRATION RATE: 20 BRPM | BODY MASS INDEX: 33.8 KG/M2

## 2023-11-13 DIAGNOSIS — E66.09 CLASS 1 OBESITY DUE TO EXCESS CALORIES WITH BODY MASS INDEX (BMI) OF 34.0 TO 34.9 IN ADULT, UNSPECIFIED WHETHER SERIOUS COMORBIDITY PRESENT: ICD-10-CM

## 2023-11-13 DIAGNOSIS — K91.2 MALNUTRITION FOLLOWING GASTROINTESTINAL SURGERY: ICD-10-CM

## 2023-11-13 DIAGNOSIS — E55.9 VITAMIN D DEFICIENCY: Primary | ICD-10-CM

## 2023-11-13 PROCEDURE — 99214 OFFICE O/P EST MOD 30 MIN: CPT | Performed by: NURSE PRACTITIONER

## 2023-11-13 PROCEDURE — 99211 OFF/OP EST MAY X REQ PHY/QHP: CPT

## 2023-11-13 PROCEDURE — G8417 CALC BMI ABV UP PARAM F/U: HCPCS | Performed by: NURSE PRACTITIONER

## 2023-11-13 PROCEDURE — 1036F TOBACCO NON-USER: CPT | Performed by: NURSE PRACTITIONER

## 2023-11-13 PROCEDURE — G8484 FLU IMMUNIZE NO ADMIN: HCPCS | Performed by: NURSE PRACTITIONER

## 2023-11-13 PROCEDURE — 3078F DIAST BP <80 MM HG: CPT | Performed by: NURSE PRACTITIONER

## 2023-11-13 PROCEDURE — 3017F COLORECTAL CA SCREEN DOC REV: CPT | Performed by: NURSE PRACTITIONER

## 2023-11-13 PROCEDURE — 3075F SYST BP GE 130 - 139MM HG: CPT | Performed by: NURSE PRACTITIONER

## 2023-11-13 PROCEDURE — G8427 DOCREV CUR MEDS BY ELIG CLIN: HCPCS | Performed by: NURSE PRACTITIONER

## 2023-11-13 RX ORDER — PRAVASTATIN SODIUM 80 MG/1
80 TABLET ORAL DAILY
COMMUNITY

## 2023-11-13 RX ORDER — PHENTERMINE HYDROCHLORIDE 37.5 MG/1
37.5 TABLET ORAL
Qty: 30 TABLET | Refills: 0 | Status: SHIPPED | OUTPATIENT
Start: 2023-11-13 | End: 2023-12-13

## 2023-11-13 NOTE — PATIENT INSTRUCTIONS
1/2 tab for 5-7 days, can increase to full tab if tolerated. Patient instructed on proper administration of medication. Patient was educated on negative side effects such as increased heart rate, elevated blood pressure, dry mouth, insomnia, constipation and nervousness. Patient verbalized understanding and will stop this medication right away if they experience any of these symptoms. Fiber supplement - do once per day  Iron - take 3 times per week instead of daily    Wean off of omeprazole at this time. Please continue to take your vitamin and mineral supplements as instructed. If you received a blood work prescription today for laboratory monitoring due prior to your next routine follow-up visit, please have this blood work obtained 10 to 14 days prior to your next visit. It is important to fast for 12 hours prior to routine weight loss surgery blood work, EXCEPT for drinking water, to ensure accuracy of results. Please report nausea, vomiting, abdominal pain, or any other problems you experience to your surgeon. For problems related to weight loss surgery, it is best to go to [facility] Emergency Department and have your surgeon paged.     Last Surgical Weight Loss:      11/13/2023    12:59 PM   Surgical Weight Loss Tracker   Consult Date 2/17/2021   Initial Height 5' 4\"   Initial Weight 250 lb   Initial BMI 42.91   Ideal Body Weight 151 lb   Surgery Date 11/15/2021   Pre-Surgical Height 5' 4\"   Pre-Surgical Weight 246 lb   Pre Surgery BMI 42.22   Weight to Lose 95 lb   Date 11/13/2023   Height 5' 4\"   Weight 198 lb   BMI 33.98   Weight Change -48 lb   Total Weight Change -48 lb   % EBWL 51%

## 2023-11-13 NOTE — PROGRESS NOTES
Princess Olivier  2023  [place] Lepe    Randy-en- Y Gastric Bypass  2 years Post-Operative Follow-up     Chief Complaint   Patient presents with    Follow Up After Procedure     2 yr LRYGB. Water intake is around 64 oz daily. Protein through shakes and foods. Vitamin intake is good. Bowel moments - some constipation. Princess Olivier is a 47 y.o. female who is 2 years  post Laparoscopic Randy-en-Y Gastric Bypass surgery. Reports that she has been having issues with stool. She is taking fiber 2 times per day as well as a daily stool softener. No relief with that. She has also tried senna without relief. She is not having swallowing difficulty. Patient denies nausea and vomiting. Patient denies gastric reflux symptoms. Patient is compliant most of the time with the iron supplement and multivitamins and calcium + Vit D. She is meeting fluid recommendations of at least 64 ounces per day and is meeting protein recommendations. She  is exercisin days per week for 30-45 minutes . Last Surgical Weight Loss:      2023    12:59 PM   Surgical Weight Loss Tracker   Consult Date 2021   Initial Height 5' 4\"   Initial Weight 250 lb   Initial BMI 42.91   Ideal Body Weight 151 lb   Surgery Date 11/15/2021   Pre-Surgical Height 5' 4\"   Pre-Surgical Weight 246 lb   Pre Surgery BMI 42.22   Weight to Lose 95 lb   Date 2023   Height 5' 4\"   Weight 198 lb   BMI 33.98   Weight Change -48 lb   Total Weight Change -48 lb   % EBWL 51%       Weight=89.8 kg (198 lb)  Today's weight represents a total weight loss of 48 pounds since surgery with 0 pounds regained since the lowest weight. Prior to Admission medications    Medication Sig Start Date End Date Taking?  Authorizing Provider   pravastatin (PRAVACHOL) 80 MG tablet Take 1 tablet by mouth daily   Yes Provider, MD Candice   phentermine (ADIPEX-P) 37.5 MG tablet Take 1 tablet by mouth every morning (before breakfast) for 30

## 2023-12-12 ENCOUNTER — OFFICE VISIT (OUTPATIENT)
Dept: BARIATRICS/WEIGHT MGMT | Age: 53
End: 2023-12-12
Payer: COMMERCIAL

## 2023-12-12 VITALS
HEART RATE: 90 BPM | RESPIRATION RATE: 20 BRPM | BODY MASS INDEX: 33.46 KG/M2 | TEMPERATURE: 97.2 F | DIASTOLIC BLOOD PRESSURE: 72 MMHG | SYSTOLIC BLOOD PRESSURE: 116 MMHG | HEIGHT: 64 IN | WEIGHT: 196 LBS

## 2023-12-12 DIAGNOSIS — K91.2 MALNUTRITION FOLLOWING GASTROINTESTINAL SURGERY: ICD-10-CM

## 2023-12-12 DIAGNOSIS — E66.09 CLASS 1 OBESITY DUE TO EXCESS CALORIES WITH BODY MASS INDEX (BMI) OF 34.0 TO 34.9 IN ADULT, UNSPECIFIED WHETHER SERIOUS COMORBIDITY PRESENT: Primary | ICD-10-CM

## 2023-12-12 PROCEDURE — 99211 OFF/OP EST MAY X REQ PHY/QHP: CPT

## 2023-12-12 PROCEDURE — G8417 CALC BMI ABV UP PARAM F/U: HCPCS | Performed by: NURSE PRACTITIONER

## 2023-12-12 PROCEDURE — 1036F TOBACCO NON-USER: CPT | Performed by: NURSE PRACTITIONER

## 2023-12-12 PROCEDURE — 3078F DIAST BP <80 MM HG: CPT | Performed by: NURSE PRACTITIONER

## 2023-12-12 PROCEDURE — 3074F SYST BP LT 130 MM HG: CPT | Performed by: NURSE PRACTITIONER

## 2023-12-12 PROCEDURE — 99213 OFFICE O/P EST LOW 20 MIN: CPT | Performed by: NURSE PRACTITIONER

## 2023-12-12 PROCEDURE — 3017F COLORECTAL CA SCREEN DOC REV: CPT | Performed by: NURSE PRACTITIONER

## 2023-12-12 PROCEDURE — G8484 FLU IMMUNIZE NO ADMIN: HCPCS | Performed by: NURSE PRACTITIONER

## 2023-12-12 PROCEDURE — G8427 DOCREV CUR MEDS BY ELIG CLIN: HCPCS | Performed by: NURSE PRACTITIONER

## 2023-12-12 RX ORDER — PHENTERMINE HYDROCHLORIDE 37.5 MG/1
37.5 TABLET ORAL
Qty: 30 TABLET | Refills: 0 | Status: SHIPPED | OUTPATIENT
Start: 2023-12-12 | End: 2024-01-11

## 2023-12-12 ASSESSMENT — ENCOUNTER SYMPTOMS
COUGH: 0
SHORTNESS OF BREATH: 0
DIARRHEA: 0
WHEEZING: 0
VOMITING: 0
NAUSEA: 0
CONSTIPATION: 0

## 2023-12-12 NOTE — PROGRESS NOTES
normal.      Palpations: Abdomen is soft. Musculoskeletal:         General: Normal range of motion. Cervical back: Normal range of motion and neck supple. Lymphadenopathy:      Cervical: No cervical adenopathy. Skin:     General: Skin is warm and dry. Neurological:      Mental Status: She is alert and oriented to person, place, and time. Psychiatric:         Behavior: Behavior normal.           Assessment/Plan:    1. Class 1 obesity due to excess calories with body mass index (BMI) of 34.0 to 34.9 in adult, unspecified whether serious comorbidity present  Patient instructed on proper administration of medication. Patient was educated on negative side effects such as increased heart rate, elevated blood pressure, dry mouth, insomnia, constipation and nervousness. Patient verbalized understanding and will stop this medication right away if they experience any of these symptoms. - phentermine (ADIPEX-P) 37.5 MG tablet; Take 1 tablet by mouth every morning (before breakfast) for 30 days. Max Daily Amount: 37.5 mg  Dispense: 30 tablet; Refill: 0    2. Malnutrition following gastrointestinal surgery  Will follow on a routine basis    Follow up in 1 month, sooner if needed.          SEGUNDO Holden - CNP

## 2023-12-12 NOTE — PATIENT INSTRUCTIONS
Please continue to take your vitamin and mineral supplements as instructed. If you received a blood work prescription today for laboratory monitoring due prior to your next routine follow-up visit, please have this blood work obtained 10 to 14 days prior to your next visit. It is important to fast for 12 hours prior to routine weight loss surgery blood work, EXCEPT for drinking water, to ensure accuracy of results. Please report nausea, vomiting, abdominal pain, or any other problems you experience to your surgeon. For problems related to weight loss surgery, it is best to go to [facility] Emergency Department and have your surgeon paged.     Last Surgical Weight Loss:      11/13/2023    12:59 PM 12/12/2023     1:19 PM   Surgical Weight Loss Tracker   Consult Date 2/17/2021    Initial Height 5' 4\"    Initial Weight 250 lb    Initial BMI 42.91    Ideal Body Weight 151 lb    Surgery Date 11/15/2021    Pre-Surgical Height 5' 4\"    Pre-Surgical Weight 246 lb    Pre Surgery BMI 42.22    Weight to Lose 95 lb    Date 11/13/2023 12/12/2023   Height 5' 4\" 5' 4\"   Weight 198 lb 196 lb   BMI 33.98 33.64   Weight Change -48 lb -2 lb   Total Weight Change -48 lb -50 lb   % EBWL 51% 53%

## 2024-01-09 ENCOUNTER — TELEPHONE (OUTPATIENT)
Dept: BARIATRICS/WEIGHT MGMT | Age: 54
End: 2024-01-09

## 2024-01-09 NOTE — TELEPHONE ENCOUNTER
Pt was scheduled today for a dietary consult but did not show.  Called and pt had forgotten about the appt.  Offered to reschedule the consult and pt said she was driving and unable to today, however, plans to call back to reschedule.

## 2024-01-11 ENCOUNTER — OFFICE VISIT (OUTPATIENT)
Dept: BARIATRICS/WEIGHT MGMT | Age: 54
End: 2024-01-11
Payer: COMMERCIAL

## 2024-01-11 VITALS
HEART RATE: 89 BPM | HEIGHT: 64 IN | RESPIRATION RATE: 20 BRPM | BODY MASS INDEX: 33.12 KG/M2 | WEIGHT: 194 LBS | SYSTOLIC BLOOD PRESSURE: 122 MMHG | TEMPERATURE: 97.8 F | DIASTOLIC BLOOD PRESSURE: 75 MMHG

## 2024-01-11 DIAGNOSIS — E66.09 CLASS 1 OBESITY DUE TO EXCESS CALORIES WITH BODY MASS INDEX (BMI) OF 34.0 TO 34.9 IN ADULT, UNSPECIFIED WHETHER SERIOUS COMORBIDITY PRESENT: Primary | ICD-10-CM

## 2024-01-11 DIAGNOSIS — I10 ESSENTIAL HYPERTENSION: ICD-10-CM

## 2024-01-11 PROCEDURE — G8427 DOCREV CUR MEDS BY ELIG CLIN: HCPCS | Performed by: NURSE PRACTITIONER

## 2024-01-11 PROCEDURE — 99213 OFFICE O/P EST LOW 20 MIN: CPT | Performed by: NURSE PRACTITIONER

## 2024-01-11 PROCEDURE — 3017F COLORECTAL CA SCREEN DOC REV: CPT | Performed by: NURSE PRACTITIONER

## 2024-01-11 PROCEDURE — 99211 OFF/OP EST MAY X REQ PHY/QHP: CPT

## 2024-01-11 PROCEDURE — 3074F SYST BP LT 130 MM HG: CPT | Performed by: NURSE PRACTITIONER

## 2024-01-11 PROCEDURE — G8484 FLU IMMUNIZE NO ADMIN: HCPCS | Performed by: NURSE PRACTITIONER

## 2024-01-11 PROCEDURE — 3078F DIAST BP <80 MM HG: CPT | Performed by: NURSE PRACTITIONER

## 2024-01-11 PROCEDURE — G8417 CALC BMI ABV UP PARAM F/U: HCPCS | Performed by: NURSE PRACTITIONER

## 2024-01-11 PROCEDURE — 1036F TOBACCO NON-USER: CPT | Performed by: NURSE PRACTITIONER

## 2024-01-11 RX ORDER — PHENTERMINE HYDROCHLORIDE 37.5 MG/1
37.5 TABLET ORAL
Qty: 30 TABLET | Refills: 0 | Status: SHIPPED | OUTPATIENT
Start: 2024-01-11 | End: 2024-02-10

## 2024-01-11 ASSESSMENT — ENCOUNTER SYMPTOMS
COUGH: 0
DIARRHEA: 0
SHORTNESS OF BREATH: 0
NAUSEA: 0
WHEEZING: 0
CONSTIPATION: 0
VOMITING: 0

## 2024-01-11 NOTE — PROGRESS NOTES
Chief Complaint   Patient presents with    Weight Management       HPI:  Patient presents today for follow up of adipex. She denies any negative side effects from the medication. She reports that she is snacking more at night and is hungrier in the evening. She has been walking for 30 minutes about 3 days per week. Blood pressure is stable at this time.     11/13/2023 - 198 lbs  12/12/2023 - 196 lbs  1/11/2024 - 194 lbs      Prior to Visit Medications    Medication Sig Taking? Authorizing Provider   phentermine (ADIPEX-P) 37.5 MG tablet Take 1 tablet by mouth every morning (before breakfast) for 30 days. Max Daily Amount: 37.5 mg Yes Lacey Concepcion APRN - CNP   pravastatin (PRAVACHOL) 80 MG tablet Take 1 tablet by mouth daily Yes Candice Tubbs MD   busPIRone (BUSPAR) 10 MG tablet Take 1 tablet by mouth in the morning and 1 tablet in the evening. Yes Candice Tubbs MD   buPROPion (WELLBUTRIN) 75 MG tablet Take 1 tablet by mouth 2 times daily Yes Candice Tubbs MD   Calcium Citrate-Vitamin D (CALCIUM + VIT D, BARIATRIC ADVANTAGE, CHEWABLE TABLET) Take 1 tablet by mouth daily Yes Candice Tubbs MD   Multiple Vitamin (MVI, BARIATRIC ADVANTAGE VITABAND, CHEW TAB) Take 1 tablet by mouth daily Yes Candice Tubbs MD   Ferrous Sulfate (IRON PO) Take by mouth Yes Candice Tubbs MD   metFORMIN (GLUCOPHAGE) 500 MG tablet Take 1 tablet by mouth 2 times daily (with meals) Yes Altawil, Badi, MD   omeprazole (PRILOSEC) 20 MG delayed release capsule Take 1 capsule by mouth daily Yes Laureano Smith MD   ondansetron (ZOFRAN-ODT) 4 MG disintegrating tablet Place 1 tablet under the tongue every 8 hours as needed for Nausea or Vomiting Yes Laureano Smith MD   levothyroxine (SYNTHROID) 25 MCG tablet Take 1 tablet by mouth daily Yes Candice Tubbs MD   hydrOXYzine (VISTARIL) 50 MG capsule Take 1 capsule by mouth 3 times daily Yes Candice Tubbs MD   traZODone (DESYREL)

## 2024-01-11 NOTE — PATIENT INSTRUCTIONS
Please continue to take your vitamin and mineral supplements as instructed.      If you received a blood work prescription today for laboratory monitoring due prior to your next routine follow-up visit, please have this blood work obtained 10 to 14 days prior to your next visit.  It is important to fast for 12 hours prior to routine weight loss surgery blood work, EXCEPT for drinking water, to ensure accuracy of results.    Please report nausea, vomiting, abdominal pain, or any other problems you experience to your surgeon.  For problems related to weight loss surgery, it is best to go to [facility] Emergency Department and have your surgeon paged.    Last Surgical Weight Loss:      11/13/2023    12:59 PM 12/12/2023     1:19 PM 1/11/2024     1:40 PM   Surgical Weight Loss Tracker   Consult Date 2/17/2021     Initial Height 5' 4\"     Initial Weight 250 lb     Initial BMI 42.91     Ideal Body Weight 151 lb     Surgery Date 11/15/2021     Pre-Surgical Height 5' 4\"     Pre-Surgical Weight 246 lb     Pre Surgery BMI 42.22     Weight to Lose 95 lb     Date 11/13/2023 12/12/2023 1/11/2024   Height 5' 4\" 5' 4\" 5' 4\"   Weight 198 lb 196 lb 194 lb   BMI 33.98 33.64 33.3   Weight Change -48 lb -2 lb -2 lb   Total Weight Change -48 lb -50 lb -52 lb   % EBWL 51% 53% 55%

## 2024-03-14 ENCOUNTER — HOSPITAL ENCOUNTER (OUTPATIENT)
Dept: MAMMOGRAPHY | Age: 54
Discharge: HOME | End: 2024-03-16
Payer: COMMERCIAL

## 2024-03-14 DIAGNOSIS — Z12.31 ENCOUNTER FOR SCREENING MAMMOGRAM FOR MALIGNANT NEOPLASM OF BREAST: ICD-10-CM

## 2024-03-14 PROCEDURE — 77063 BREAST TOMOSYNTHESIS BI: CPT

## 2024-04-18 ENCOUNTER — TELEPHONE (OUTPATIENT)
Dept: FAMILY MEDICINE CLINIC | Age: 54
End: 2024-04-18

## 2024-04-18 NOTE — TELEPHONE ENCOUNTER
Attempted to call Barb. She answered the phone, I told her I was calling to get her scheduled for a new patient appointment at our office ([facility]) and she hung up on me.

## 2024-04-18 NOTE — TELEPHONE ENCOUNTER
----- Message from [employer] sent at 4/15/2024  4:26 PM EDT -----  Subject: Appointment Request    Reason for Call: New Patient/New to Provider Appointment needed: New   Patient Request Appointment    QUESTIONS    Reason for appointment request? No appointments available during search     Additional Information for Provider? Pt would like to get established with   Azam Lee. Pt would like a call back.   ---------------------------------------------------------------------------  --------------  CALL BACK INFO  [phone]; OK to leave message on voicemail  ---------------------------------------------------------------------------  --------------  SCRIPT ANSWERS   ,

## 2024-06-18 ENCOUNTER — OFFICE VISIT (OUTPATIENT)
Dept: BARIATRICS/WEIGHT MGMT | Age: 54
End: 2024-06-18
Payer: COMMERCIAL

## 2024-06-18 VITALS
TEMPERATURE: 98.2 F | BODY MASS INDEX: 34.83 KG/M2 | SYSTOLIC BLOOD PRESSURE: 146 MMHG | DIASTOLIC BLOOD PRESSURE: 81 MMHG | HEIGHT: 64 IN | HEART RATE: 86 BPM | WEIGHT: 204 LBS | RESPIRATION RATE: 20 BRPM

## 2024-06-18 DIAGNOSIS — K91.2 MALNUTRITION FOLLOWING GASTROINTESTINAL SURGERY: ICD-10-CM

## 2024-06-18 DIAGNOSIS — E66.09 CLASS 1 OBESITY DUE TO EXCESS CALORIES WITH BODY MASS INDEX (BMI) OF 34.0 TO 34.9 IN ADULT, UNSPECIFIED WHETHER SERIOUS COMORBIDITY PRESENT: Primary | ICD-10-CM

## 2024-06-18 PROCEDURE — 3077F SYST BP >= 140 MM HG: CPT | Performed by: NURSE PRACTITIONER

## 2024-06-18 PROCEDURE — 1036F TOBACCO NON-USER: CPT | Performed by: NURSE PRACTITIONER

## 2024-06-18 PROCEDURE — 99211 OFF/OP EST MAY X REQ PHY/QHP: CPT

## 2024-06-18 PROCEDURE — 3017F COLORECTAL CA SCREEN DOC REV: CPT | Performed by: NURSE PRACTITIONER

## 2024-06-18 PROCEDURE — 99213 OFFICE O/P EST LOW 20 MIN: CPT | Performed by: NURSE PRACTITIONER

## 2024-06-18 PROCEDURE — 3079F DIAST BP 80-89 MM HG: CPT | Performed by: NURSE PRACTITIONER

## 2024-06-18 PROCEDURE — G8417 CALC BMI ABV UP PARAM F/U: HCPCS | Performed by: NURSE PRACTITIONER

## 2024-06-18 PROCEDURE — G8427 DOCREV CUR MEDS BY ELIG CLIN: HCPCS | Performed by: NURSE PRACTITIONER

## 2024-06-18 RX ORDER — PHENTERMINE HYDROCHLORIDE 37.5 MG/1
37.5 TABLET ORAL
Qty: 30 TABLET | Refills: 0 | Status: SHIPPED | OUTPATIENT
Start: 2024-06-18 | End: 2024-07-18

## 2024-06-18 ASSESSMENT — ENCOUNTER SYMPTOMS
VOMITING: 0
DIARRHEA: 0
CONSTIPATION: 0
NAUSEA: 0
SHORTNESS OF BREATH: 0
COUGH: 0
WHEEZING: 0

## 2024-06-18 NOTE — PATIENT INSTRUCTIONS
Patient instructed on proper administration of medication. Patient was educated on negative side effects such as increased heart rate, elevated blood pressure, dry mouth, insomnia, constipation and nervousness. Patient verbalized understanding and will stop this medication right away if they experience any of these symptoms.       Please continue to take your vitamin and mineral supplements as instructed.      If you received a blood work prescription today for laboratory monitoring due prior to your next routine follow-up visit, please have this blood work obtained 10 to 14 days prior to your next visit.  It is important to fast for 12 hours prior to routine weight loss surgery blood work, EXCEPT for drinking water, to ensure accuracy of results.    Please report nausea, vomiting, abdominal pain, or any other problems you experience to your surgeon.  For problems related to weight loss surgery, it is best to go to [facility] Emergency Department and have your surgeon paged.    Last Surgical Weight Loss:      11/13/2023    12:59 PM 12/12/2023     1:19 PM 1/11/2024     1:40 PM 6/18/2024     3:20 PM   Surgical Weight Loss Tracker   Consult Date 2/17/2021      Initial Height 5' 4\"      Initial Weight 250 lb      Initial BMI 42.91      Ideal Body Weight 151 lb      Surgery Date 11/15/2021      Pre-Surgical Height 5' 4\"      Pre-Surgical Weight 246 lb      Pre Surgery BMI 42.22      Weight to Lose 95 lb      Date 11/13/2023 12/12/2023 1/11/2024 6/18/2024   Height 5' 4\" 5' 4\" 5' 4\" 5' 4\"   Weight 198 lb 196 lb 194 lb 196 lb   BMI 33.98 33.64 33.3 33.64   Weight Change -48 lb -2 lb -2 lb 2 lb   Total Weight Change -48 lb -50 lb -52 lb -50 lb   % EBWL 51% 53% 55% 53%

## 2024-06-18 NOTE — PROGRESS NOTES
Candice Tubbs MD   cyclobenzaprine (FLEXERIL) 10 MG tablet Take 1 tablet by mouth daily Yes Candice Tubbs MD   pravastatin (PRAVACHOL) 80 MG tablet Take 1 tablet by mouth daily  Patient not taking: Reported on 6/18/2024  Candice Tubbs MD   metFORMIN (GLUCOPHAGE) 500 MG tablet Take 1 tablet by mouth 2 times daily (with meals)  Patient not taking: Reported on 6/18/2024  Altawil, Badi, MD   levothyroxine (SYNTHROID) 25 MCG tablet Take 1 tablet by mouth daily  Patient not taking: Reported on 6/18/2024  Candice Tubbs MD         No Known Allergies      Review of Systems  Review of Systems   Constitutional:  Negative for chills and fever.   HENT:  Negative for congestion and nosebleeds.    Respiratory:  Negative for cough, shortness of breath and wheezing.    Cardiovascular:  Negative for chest pain, palpitations and leg swelling.   Gastrointestinal:  Negative for constipation, diarrhea, nausea and vomiting.   Genitourinary:  Negative for dysuria and urgency.   Musculoskeletal:  Negative for neck pain.   Neurological:  Negative for headaches.         VS:  BP (!) 146/81 (Site: Right Upper Arm, Position: Sitting, Cuff Size: Medium Adult)   Pulse 86   Temp 98.2 °F (36.8 °C) (Temporal)   Resp 20   Ht 1.626 m (5' 4\")   Wt 92.5 kg (204 lb)   LMP  (LMP Unknown)   BMI 35.02 kg/m²     Patient's medical, social, and family history reviewed      Physical Exam  Physical Exam  Constitutional:       Appearance: She is well-developed.   HENT:      Head: Normocephalic.      Mouth/Throat:      Mouth: Mucous membranes are moist.   Eyes:      Pupils: Pupils are equal, round, and reactive to light.   Neck:      Thyroid: No thyromegaly.   Cardiovascular:      Rate and Rhythm: Normal rate and regular rhythm.   Pulmonary:      Effort: Pulmonary effort is normal.      Breath sounds: Normal breath sounds.   Abdominal:      General: Bowel sounds are normal.      Palpations: Abdomen is soft.      Tenderness:

## 2024-06-25 ENCOUNTER — TELEPHONE (OUTPATIENT)
Dept: BARIATRICS/WEIGHT MGMT | Age: 54
End: 2024-06-25

## 2024-06-25 NOTE — TELEPHONE ENCOUNTER
Barb called and asked if Lacey had sent medication orders to pharmacy yet? She stated that Lacey was going to refill medication PCP had prescribed. \"Patient to phone in medications from PCP, will refill short term for patient until she is able to get into seeing her PCP again.

## 2024-06-26 DIAGNOSIS — K91.2 MALNUTRITION FOLLOWING GASTROINTESTINAL SURGERY: Primary | ICD-10-CM

## 2024-06-26 RX ORDER — BUSPIRONE HYDROCHLORIDE 10 MG/1
10 TABLET ORAL 2 TIMES DAILY
Qty: 60 TABLET | Refills: 1 | Status: SHIPPED | OUTPATIENT
Start: 2024-06-26

## 2024-06-26 RX ORDER — BUPROPION HYDROCHLORIDE 75 MG/1
75 TABLET ORAL 2 TIMES DAILY
Qty: 60 TABLET | Refills: 1 | Status: SHIPPED | OUTPATIENT
Start: 2024-06-26

## 2024-06-26 RX ORDER — LEVOTHYROXINE SODIUM 0.03 MG/1
25 TABLET ORAL DAILY
Qty: 30 TABLET | Refills: 1 | Status: SHIPPED | OUTPATIENT
Start: 2024-06-26

## 2024-07-05 ENCOUNTER — TELEPHONE (OUTPATIENT)
Dept: FAMILY MEDICINE CLINIC | Age: 54
End: 2024-07-05

## 2024-07-05 NOTE — TELEPHONE ENCOUNTER
----- Message from Gavin Aguillon sent at 7/5/2024  4:08 PM EDT -----  Regarding: [facility] Appointment Request  [facility] Appointment Request    Patient needs appointment for [facility] Appointment Type: New to Provider.    Patient Requested Dates(s): 07/09/2024 or 07/10/2024  Patient Requested Time: after 10:00 AM  Provider Name: Mitzy Person, SEGUNDO-CNP    Reason for Appointment Request: New Patient - Requested Provider unavailable  --------------------------------------------------------------------------------------------------------------------------    Relationship to Patient: Self     Call Back Information: OK to leave message on voicemail  Preferred Call Back Number: Phone  [phone]

## 2024-07-17 DIAGNOSIS — I10 ESSENTIAL HYPERTENSION: Primary | ICD-10-CM

## 2024-07-17 RX ORDER — LISINOPRIL 5 MG/1
5 TABLET ORAL DAILY
Qty: 90 TABLET | Refills: 1 | Status: SHIPPED | OUTPATIENT
Start: 2024-07-17

## 2024-07-25 ENCOUNTER — OFFICE VISIT (OUTPATIENT)
Dept: BARIATRICS/WEIGHT MGMT | Age: 54
End: 2024-07-25
Payer: COMMERCIAL

## 2024-07-25 VITALS
DIASTOLIC BLOOD PRESSURE: 82 MMHG | HEART RATE: 98 BPM | HEIGHT: 64 IN | BODY MASS INDEX: 34.15 KG/M2 | WEIGHT: 200 LBS | SYSTOLIC BLOOD PRESSURE: 132 MMHG

## 2024-07-25 DIAGNOSIS — M54.42 CHRONIC BILATERAL LOW BACK PAIN WITH BILATERAL SCIATICA: ICD-10-CM

## 2024-07-25 DIAGNOSIS — K91.2 MALNUTRITION FOLLOWING GASTROINTESTINAL SURGERY: Primary | ICD-10-CM

## 2024-07-25 DIAGNOSIS — E66.09 CLASS 1 OBESITY DUE TO EXCESS CALORIES WITH BODY MASS INDEX (BMI) OF 34.0 TO 34.9 IN ADULT, UNSPECIFIED WHETHER SERIOUS COMORBIDITY PRESENT: ICD-10-CM

## 2024-07-25 DIAGNOSIS — M54.41 CHRONIC BILATERAL LOW BACK PAIN WITH BILATERAL SCIATICA: ICD-10-CM

## 2024-07-25 DIAGNOSIS — G89.29 CHRONIC BILATERAL LOW BACK PAIN WITH BILATERAL SCIATICA: ICD-10-CM

## 2024-07-25 PROCEDURE — 99213 OFFICE O/P EST LOW 20 MIN: CPT | Performed by: NURSE PRACTITIONER

## 2024-07-25 PROCEDURE — G8417 CALC BMI ABV UP PARAM F/U: HCPCS | Performed by: NURSE PRACTITIONER

## 2024-07-25 PROCEDURE — 99211 OFF/OP EST MAY X REQ PHY/QHP: CPT

## 2024-07-25 PROCEDURE — 3079F DIAST BP 80-89 MM HG: CPT | Performed by: NURSE PRACTITIONER

## 2024-07-25 PROCEDURE — 3075F SYST BP GE 130 - 139MM HG: CPT | Performed by: NURSE PRACTITIONER

## 2024-07-25 PROCEDURE — 3017F COLORECTAL CA SCREEN DOC REV: CPT | Performed by: NURSE PRACTITIONER

## 2024-07-25 PROCEDURE — 1036F TOBACCO NON-USER: CPT | Performed by: NURSE PRACTITIONER

## 2024-07-25 PROCEDURE — G8427 DOCREV CUR MEDS BY ELIG CLIN: HCPCS | Performed by: NURSE PRACTITIONER

## 2024-07-25 RX ORDER — PHENTERMINE HYDROCHLORIDE 37.5 MG/1
37.5 TABLET ORAL
Qty: 30 TABLET | Refills: 0 | Status: SHIPPED | OUTPATIENT
Start: 2024-07-25 | End: 2024-08-24

## 2024-07-25 RX ORDER — PHENTERMINE HYDROCHLORIDE 37.5 MG/1
37.5 CAPSULE ORAL EVERY MORNING
COMMUNITY
End: 2024-07-25

## 2024-07-25 RX ORDER — MELOXICAM 7.5 MG/1
7.5 TABLET ORAL DAILY
COMMUNITY
End: 2024-07-25 | Stop reason: SDUPTHER

## 2024-07-25 RX ORDER — CYCLOBENZAPRINE HCL 10 MG
10 TABLET ORAL DAILY
Qty: 30 TABLET | Refills: 0 | Status: SHIPPED | OUTPATIENT
Start: 2024-07-25 | End: 2024-08-24

## 2024-07-25 RX ORDER — PRAVASTATIN SODIUM 80 MG/1
80 TABLET ORAL DAILY
Qty: 30 TABLET | Refills: 1 | Status: SHIPPED | OUTPATIENT
Start: 2024-07-25

## 2024-07-25 RX ORDER — OXCARBAZEPINE 60 MG/ML
SUSPENSION ORAL 2 TIMES DAILY
COMMUNITY

## 2024-07-25 RX ORDER — MELOXICAM 7.5 MG/1
7.5 TABLET ORAL DAILY
Qty: 30 TABLET | Refills: 0 | Status: SHIPPED | OUTPATIENT
Start: 2024-07-25

## 2024-07-25 ASSESSMENT — ENCOUNTER SYMPTOMS
SHORTNESS OF BREATH: 0
NAUSEA: 0
CONSTIPATION: 0
VOMITING: 0
COUGH: 0
WHEEZING: 0
DIARRHEA: 0

## 2024-07-25 NOTE — PATIENT INSTRUCTIONS
Please continue to take your vitamin and mineral supplements as instructed.      If you received a blood work prescription today for laboratory monitoring due prior to your next routine follow-up visit, please have this blood work obtained 10 to 14 days prior to your next visit.  It is important to fast for 12 hours prior to routine weight loss surgery blood work, EXCEPT for drinking water, to ensure accuracy of results.    Please report nausea, vomiting, abdominal pain, or any other problems you experience to your surgeon.  For problems related to weight loss surgery, it is best to go to [facility] Department and have your surgeon paged.    Last Surgical Weight Loss:      11/13/2023    12:59 PM 12/12/2023     1:19 PM 1/11/2024     1:40 PM 6/18/2024     3:20 PM   Surgical Weight Loss Tracker   Consult Date 2/17/2021      Initial Height 5' 4\"      Initial Weight 250 lb      Initial BMI 42.91      Ideal Body Weight 151 lb      Surgery Date 11/15/2021      Pre-Surgical Height 5' 4\"      Pre-Surgical Weight 246 lb      Pre Surgery BMI 42.22      Weight to Lose 95 lb      Date 11/13/2023 12/12/2023 1/11/2024 6/18/2024   Height 5' 4\" 5' 4\" 5' 4\" 5' 4\"   Weight 198 lb 196 lb 194 lb 196 lb   BMI 33.98 33.64 33.3 33.64   Weight Change -48 lb -2 lb -2 lb 2 lb   Total Weight Change -48 lb -50 lb -52 lb -50 lb   % EBWL 51% 53% 55% 53%

## 2024-07-25 NOTE — PROGRESS NOTES
Chief Complaint   Patient presents with    Medication Check     Phentermine        HPI:  Patient presents today for  follow up of phentermine. She denies any issues with constipation. She does notice the appetite suppression from the medication. She has been trying to walk more. She does have chronic low back pain with radiation bilaterally. She tells me that her PCP is still not back to work at this time.     11/13/2023 - 198 lbs  12/12/2023 - 196 lbs  1/11/2024 - 194 lbs  6/18/2024 - 204 lbs - restart phentermine  7/25/2024 - 200 lb      Prior to Visit Medications    Medication Sig Taking? Authorizing Provider   OXcarbazepine (TRILEPTAL) 300 MG/5ML suspension Take by mouth 2 times daily Yes Candice Tubbs MD   cariprazine hcl (VRAYLAR) 1.5 MG capsule Take 1 capsule by mouth daily Yes Candice Tubbs MD   meloxicam (MOBIC) 7.5 MG tablet Take 1 tablet by mouth daily Yes Candice Tubbs MD   phentermine 37.5 MG capsule Take 1 capsule by mouth every morning. Max Daily Amount: 37.5 mg Yes Candice Tubbs MD   lisinopril (PRINIVIL;ZESTRIL) 5 MG tablet Take 1 tablet by mouth daily Yes Lacey Concepcion APRN - CNP   metFORMIN (GLUCOPHAGE) 500 MG tablet Take 1 tablet by mouth 2 times daily (with meals) Yes Lacey Concepcion APRN - CNP   levothyroxine (SYNTHROID) 25 MCG tablet Take 1 tablet by mouth daily Yes Lacey Concecpion APRN - CNP   busPIRone (BUSPAR) 10 MG tablet Take 1 tablet by mouth in the morning and 1 tablet in the evening. Yes Lacey Concepcion APRN - CNP   buPROPion (WELLBUTRIN) 75 MG tablet Take 1 tablet by mouth 2 times daily Yes Lacey Concepcion APRN - CNP   pravastatin (PRAVACHOL) 80 MG tablet Take 1 tablet by mouth daily Yes Candice Tubbs MD   Calcium Citrate-Vitamin D (CALCIUM + VIT D, BARIATRIC ADVANTAGE, CHEWABLE TABLET) Take 1 tablet by mouth daily Yes Candice Tubbs MD   Multiple Vitamin (MVI, BARIATRIC ADVANTAGE VITABAND, CHEW TAB) Take 1 tablet by

## 2024-08-23 ENCOUNTER — OFFICE VISIT (OUTPATIENT)
Dept: BARIATRICS/WEIGHT MGMT | Age: 54
End: 2024-08-23
Payer: COMMERCIAL

## 2024-08-23 VITALS
BODY MASS INDEX: 33.46 KG/M2 | TEMPERATURE: 97 F | DIASTOLIC BLOOD PRESSURE: 64 MMHG | HEIGHT: 64 IN | SYSTOLIC BLOOD PRESSURE: 116 MMHG | OXYGEN SATURATION: 96 % | HEART RATE: 93 BPM | WEIGHT: 196 LBS

## 2024-08-23 DIAGNOSIS — K91.2 MALNUTRITION FOLLOWING GASTROINTESTINAL SURGERY: Primary | ICD-10-CM

## 2024-08-23 DIAGNOSIS — E66.09 CLASS 1 OBESITY DUE TO EXCESS CALORIES WITH BODY MASS INDEX (BMI) OF 34.0 TO 34.9 IN ADULT, UNSPECIFIED WHETHER SERIOUS COMORBIDITY PRESENT: ICD-10-CM

## 2024-08-23 DIAGNOSIS — M54.41 CHRONIC BILATERAL LOW BACK PAIN WITH BILATERAL SCIATICA: ICD-10-CM

## 2024-08-23 DIAGNOSIS — G89.29 CHRONIC BILATERAL LOW BACK PAIN WITH BILATERAL SCIATICA: ICD-10-CM

## 2024-08-23 DIAGNOSIS — M54.42 CHRONIC BILATERAL LOW BACK PAIN WITH BILATERAL SCIATICA: ICD-10-CM

## 2024-08-23 PROCEDURE — 99213 OFFICE O/P EST LOW 20 MIN: CPT | Performed by: NURSE PRACTITIONER

## 2024-08-23 PROCEDURE — 3078F DIAST BP <80 MM HG: CPT | Performed by: NURSE PRACTITIONER

## 2024-08-23 PROCEDURE — G8427 DOCREV CUR MEDS BY ELIG CLIN: HCPCS | Performed by: NURSE PRACTITIONER

## 2024-08-23 PROCEDURE — 3074F SYST BP LT 130 MM HG: CPT | Performed by: NURSE PRACTITIONER

## 2024-08-23 PROCEDURE — G8417 CALC BMI ABV UP PARAM F/U: HCPCS | Performed by: NURSE PRACTITIONER

## 2024-08-23 PROCEDURE — 1036F TOBACCO NON-USER: CPT | Performed by: NURSE PRACTITIONER

## 2024-08-23 PROCEDURE — 3017F COLORECTAL CA SCREEN DOC REV: CPT | Performed by: NURSE PRACTITIONER

## 2024-08-23 RX ORDER — PHENTERMINE HYDROCHLORIDE 37.5 MG/1
37.5 TABLET ORAL
Qty: 30 TABLET | Refills: 0 | Status: SHIPPED | OUTPATIENT
Start: 2024-08-23 | End: 2024-09-22

## 2024-08-23 ASSESSMENT — ENCOUNTER SYMPTOMS
COUGH: 0
CONSTIPATION: 0
VOMITING: 0
DIARRHEA: 0
SHORTNESS OF BREATH: 0
WHEEZING: 0
NAUSEA: 0

## 2024-08-23 NOTE — PATIENT INSTRUCTIONS
Please continue to take your vitamin and mineral supplements as instructed.      If you received a blood work prescription today for laboratory monitoring due prior to your next routine follow-up visit, please have this blood work obtained 10 to 14 days prior to your next visit.  It is important to fast for 12 hours prior to routine weight loss surgery blood work, EXCEPT for drinking water, to ensure accuracy of results.    Please report nausea, vomiting, abdominal pain, or any other problems you experience to your surgeon.  For problems related to weight loss surgery, it is best to go to [facility] Emergency Department and have your surgeon paged.    Last Surgical Weight Loss:      11/13/2023    12:59 PM 12/12/2023     1:19 PM 1/11/2024     1:40 PM 6/18/2024     3:20 PM   Surgical Weight Loss Tracker   Consult Date 2/17/2021      Initial Height 5' 4\"      Initial Weight 250 lb      Initial BMI 42.91      Ideal Body Weight 151 lb      Surgery Date 11/15/2021      Pre-Surgical Height 5' 4\"      Pre-Surgical Weight 246 lb      Pre Surgery BMI 42.22      Weight to Lose 95 lb      Date 11/13/2023 12/12/2023 1/11/2024 6/18/2024   Height 5' 4\" 5' 4\" 5' 4\" 5' 4\"   Weight 198 lb 196 lb 194 lb 196 lb   BMI 33.98 33.64 33.3 33.64   Weight Change -48 lb -2 lb -2 lb 2 lb   Total Weight Change -48 lb -50 lb -52 lb -50 lb   % EBWL 51% 53% 55% 53%

## 2024-08-23 NOTE — PROGRESS NOTES
Pulmonary effort is normal.      Breath sounds: Normal breath sounds.   Abdominal:      General: Bowel sounds are normal.      Palpations: Abdomen is soft.      Tenderness: There is no abdominal tenderness.   Musculoskeletal:         General: Normal range of motion.      Cervical back: Normal range of motion and neck supple.   Lymphadenopathy:      Cervical: No cervical adenopathy.   Skin:     General: Skin is warm and dry.   Neurological:      Mental Status: She is alert and oriented to person, place, and time.   Psychiatric:         Behavior: Behavior normal.           Assessment/Plan:    1. Malnutrition following gastrointestinal surgery  Patient instructed on proper administration of medication. Patient was educated on negative side effects such as increased heart rate, elevated blood pressure, dry mouth, insomnia, constipation and nervousness. Patient verbalized understanding and will stop this medication right away if they experience any of these symptoms.     - phentermine (ADIPEX-P) 37.5 MG tablet; Take 1 tablet by mouth every morning (before breakfast) for 30 days. Max Daily Amount: 37.5 mg  Dispense: 30 tablet; Refill: 0    Follow up as scheduled    2. Chronic bilateral low back pain with bilateral sciatica    - phentermine (ADIPEX-P) 37.5 MG tablet; Take 1 tablet by mouth every morning (before breakfast) for 30 days. Max Daily Amount: 37.5 mg  Dispense: 30 tablet; Refill: 0    3. Class 1 obesity due to excess calories with body mass index (BMI) of 34.0 to 34.9 in adult, unspecified whether serious comorbidity present    - phentermine (ADIPEX-P) 37.5 MG tablet; Take 1 tablet by mouth every morning (before breakfast) for 30 days. Max Daily Amount: 37.5 mg  Dispense: 30 tablet; Refill: 0    Return in about 1 month (around 9/23/2024), or if symptoms worsen or fail to improve.        Lacey Concepcion, APRN - CNP

## 2024-09-14 DIAGNOSIS — K91.2 MALNUTRITION FOLLOWING GASTROINTESTINAL SURGERY: ICD-10-CM

## 2024-09-17 ENCOUNTER — OFFICE VISIT (OUTPATIENT)
Dept: BARIATRICS/WEIGHT MGMT | Age: 54
End: 2024-09-17
Payer: COMMERCIAL

## 2024-09-17 VITALS
BODY MASS INDEX: 34.15 KG/M2 | WEIGHT: 200 LBS | HEART RATE: 88 BPM | HEIGHT: 64 IN | SYSTOLIC BLOOD PRESSURE: 128 MMHG | DIASTOLIC BLOOD PRESSURE: 72 MMHG

## 2024-09-17 DIAGNOSIS — E66.09 CLASS 1 OBESITY DUE TO EXCESS CALORIES WITH BODY MASS INDEX (BMI) OF 34.0 TO 34.9 IN ADULT, UNSPECIFIED WHETHER SERIOUS COMORBIDITY PRESENT: ICD-10-CM

## 2024-09-17 DIAGNOSIS — I10 ESSENTIAL HYPERTENSION: ICD-10-CM

## 2024-09-17 DIAGNOSIS — E78.5 HYPERLIPIDEMIA, UNSPECIFIED HYPERLIPIDEMIA TYPE: ICD-10-CM

## 2024-09-17 DIAGNOSIS — K91.2 MALNUTRITION FOLLOWING GASTROINTESTINAL SURGERY: Primary | ICD-10-CM

## 2024-09-17 PROCEDURE — G8427 DOCREV CUR MEDS BY ELIG CLIN: HCPCS | Performed by: NURSE PRACTITIONER

## 2024-09-17 PROCEDURE — 3074F SYST BP LT 130 MM HG: CPT | Performed by: NURSE PRACTITIONER

## 2024-09-17 PROCEDURE — 99213 OFFICE O/P EST LOW 20 MIN: CPT | Performed by: NURSE PRACTITIONER

## 2024-09-17 PROCEDURE — 3078F DIAST BP <80 MM HG: CPT | Performed by: NURSE PRACTITIONER

## 2024-09-17 PROCEDURE — 3017F COLORECTAL CA SCREEN DOC REV: CPT | Performed by: NURSE PRACTITIONER

## 2024-09-17 PROCEDURE — 99211 OFF/OP EST MAY X REQ PHY/QHP: CPT | Performed by: NURSE PRACTITIONER

## 2024-09-17 PROCEDURE — G8417 CALC BMI ABV UP PARAM F/U: HCPCS | Performed by: NURSE PRACTITIONER

## 2024-09-17 PROCEDURE — 4004F PT TOBACCO SCREEN RCVD TLK: CPT | Performed by: NURSE PRACTITIONER

## 2024-09-17 RX ORDER — LEVOTHYROXINE SODIUM 25 UG/1
25 TABLET ORAL DAILY
Qty: 30 TABLET | Refills: 1 | Status: SHIPPED | OUTPATIENT
Start: 2024-09-17

## 2024-09-17 RX ORDER — PRAVASTATIN SODIUM 80 MG/1
80 TABLET ORAL DAILY
Qty: 30 TABLET | Refills: 0 | OUTPATIENT
Start: 2024-09-17

## 2024-09-17 RX ORDER — LEVOTHYROXINE SODIUM 25 UG/1
25 TABLET ORAL DAILY
Qty: 30 TABLET | Refills: 0 | OUTPATIENT
Start: 2024-09-17

## 2024-09-17 RX ORDER — TOPIRAMATE 25 MG/1
25 TABLET, FILM COATED ORAL NIGHTLY
Qty: 60 TABLET | Refills: 3 | Status: SHIPPED | OUTPATIENT
Start: 2024-09-17

## 2024-09-17 RX ORDER — PRAVASTATIN SODIUM 80 MG/1
80 TABLET ORAL DAILY
Qty: 30 TABLET | Refills: 1 | Status: SHIPPED | OUTPATIENT
Start: 2024-09-17

## 2024-09-17 RX ORDER — LISINOPRIL 5 MG/1
5 TABLET ORAL DAILY
Qty: 30 TABLET | Refills: 2 | Status: SHIPPED | OUTPATIENT
Start: 2024-09-17 | End: 2024-12-16

## 2024-09-24 ENCOUNTER — OFFICE VISIT (OUTPATIENT)
Dept: FAMILY MEDICINE CLINIC | Age: 54
End: 2024-09-24
Payer: COMMERCIAL

## 2024-09-24 VITALS
DIASTOLIC BLOOD PRESSURE: 84 MMHG | OXYGEN SATURATION: 98 % | RESPIRATION RATE: 16 BRPM | BODY MASS INDEX: 34.31 KG/M2 | TEMPERATURE: 97.6 F | HEART RATE: 85 BPM | HEIGHT: 64 IN | SYSTOLIC BLOOD PRESSURE: 128 MMHG | WEIGHT: 201 LBS

## 2024-09-24 DIAGNOSIS — E16.1 HYPERINSULINEMIA: ICD-10-CM

## 2024-09-24 DIAGNOSIS — M54.42 CHRONIC BILATERAL LOW BACK PAIN WITH BILATERAL SCIATICA: ICD-10-CM

## 2024-09-24 DIAGNOSIS — M54.41 CHRONIC BILATERAL LOW BACK PAIN WITH BILATERAL SCIATICA: ICD-10-CM

## 2024-09-24 DIAGNOSIS — G89.29 CHRONIC BILATERAL LOW BACK PAIN WITH BILATERAL SCIATICA: ICD-10-CM

## 2024-09-24 DIAGNOSIS — F51.05 INSOMNIA SECONDARY TO DEPRESSION WITH ANXIETY: ICD-10-CM

## 2024-09-24 DIAGNOSIS — F41.8 INSOMNIA SECONDARY TO DEPRESSION WITH ANXIETY: ICD-10-CM

## 2024-09-24 DIAGNOSIS — Z12.11 COLON CANCER SCREENING: Primary | ICD-10-CM

## 2024-09-24 DIAGNOSIS — F41.8 DEPRESSION WITH ANXIETY: ICD-10-CM

## 2024-09-24 DIAGNOSIS — K59.00 CONSTIPATION, UNSPECIFIED CONSTIPATION TYPE: ICD-10-CM

## 2024-09-24 DIAGNOSIS — E03.9 ACQUIRED HYPOTHYROIDISM: ICD-10-CM

## 2024-09-24 DIAGNOSIS — M62.838 MUSCLE SPASM: ICD-10-CM

## 2024-09-24 PROBLEM — U07.1 COVID-19 VIRUS INFECTION: Status: RESOLVED | Noted: 2021-11-24 | Resolved: 2024-09-24

## 2024-09-24 PROBLEM — J96.01 ACUTE RESPIRATORY FAILURE WITH HYPOXIA: Status: RESOLVED | Noted: 2021-11-24 | Resolved: 2024-09-24

## 2024-09-24 PROBLEM — E11.9 TYPE 2 DIABETES MELLITUS WITHOUT COMPLICATION, WITHOUT LONG-TERM CURRENT USE OF INSULIN (HCC): Status: RESOLVED | Noted: 2021-04-20 | Resolved: 2024-09-24

## 2024-09-24 PROCEDURE — 3074F SYST BP LT 130 MM HG: CPT | Performed by: NURSE PRACTITIONER

## 2024-09-24 PROCEDURE — 4004F PT TOBACCO SCREEN RCVD TLK: CPT | Performed by: NURSE PRACTITIONER

## 2024-09-24 PROCEDURE — G8427 DOCREV CUR MEDS BY ELIG CLIN: HCPCS | Performed by: NURSE PRACTITIONER

## 2024-09-24 PROCEDURE — 99204 OFFICE O/P NEW MOD 45 MIN: CPT | Performed by: NURSE PRACTITIONER

## 2024-09-24 PROCEDURE — 3079F DIAST BP 80-89 MM HG: CPT | Performed by: NURSE PRACTITIONER

## 2024-09-24 PROCEDURE — G8417 CALC BMI ABV UP PARAM F/U: HCPCS | Performed by: NURSE PRACTITIONER

## 2024-09-24 PROCEDURE — 3017F COLORECTAL CA SCREEN DOC REV: CPT | Performed by: NURSE PRACTITIONER

## 2024-09-24 RX ORDER — WHEAT DEXTRIN 3 G/3.8 G
4 POWDER (GRAM) ORAL
Qty: 500 G | Refills: 2 | Status: SHIPPED | OUTPATIENT
Start: 2024-09-24

## 2024-09-24 RX ORDER — OXCARBAZEPINE 300 MG/1
300 TABLET, FILM COATED ORAL 2 TIMES DAILY
COMMUNITY
Start: 2024-09-06

## 2024-09-24 RX ORDER — CYCLOBENZAPRINE HCL 10 MG
10 TABLET ORAL 3 TIMES DAILY PRN
Qty: 30 TABLET | Refills: 2 | Status: SHIPPED | OUTPATIENT
Start: 2024-09-24

## 2024-09-24 RX ORDER — MELOXICAM 7.5 MG/1
7.5 TABLET ORAL DAILY
Qty: 30 TABLET | Refills: 2 | Status: SHIPPED | OUTPATIENT
Start: 2024-09-24

## 2024-09-24 SDOH — ECONOMIC STABILITY: FOOD INSECURITY: WITHIN THE PAST 12 MONTHS, YOU WORRIED THAT YOUR FOOD WOULD RUN OUT BEFORE YOU GOT MONEY TO BUY MORE.: NEVER TRUE

## 2024-09-24 SDOH — ECONOMIC STABILITY: INCOME INSECURITY: HOW HARD IS IT FOR YOU TO PAY FOR THE VERY BASICS LIKE FOOD, HOUSING, MEDICAL CARE, AND HEATING?: NOT HARD AT ALL

## 2024-09-24 SDOH — ECONOMIC STABILITY: FOOD INSECURITY: WITHIN THE PAST 12 MONTHS, THE FOOD YOU BOUGHT JUST DIDN'T LAST AND YOU DIDN'T HAVE MONEY TO GET MORE.: NEVER TRUE

## 2024-09-24 ASSESSMENT — PATIENT HEALTH QUESTIONNAIRE - PHQ9
SUM OF ALL RESPONSES TO PHQ QUESTIONS 1-9: 0
1. LITTLE INTEREST OR PLEASURE IN DOING THINGS: NOT AT ALL
SUM OF ALL RESPONSES TO PHQ QUESTIONS 1-9: 0
SUM OF ALL RESPONSES TO PHQ9 QUESTIONS 1 & 2: 0
2. FEELING DOWN, DEPRESSED OR HOPELESS: NOT AT ALL

## 2024-10-06 ENCOUNTER — HOSPITAL ENCOUNTER (EMERGENCY)
Age: 54
Discharge: HOME | End: 2024-10-06
Attending: FAMILY MEDICINE
Payer: COMMERCIAL

## 2024-10-06 VITALS
OXYGEN SATURATION: 97 % | HEART RATE: 92 BPM | SYSTOLIC BLOOD PRESSURE: 124 MMHG | TEMPERATURE: 97.2 F | RESPIRATION RATE: 16 BRPM | DIASTOLIC BLOOD PRESSURE: 74 MMHG

## 2024-10-06 DIAGNOSIS — N39.0 URINARY TRACT INFECTION WITHOUT HEMATURIA, SITE UNSPECIFIED: Primary | ICD-10-CM

## 2024-10-06 LAB
BACTERIA URNS QL MICRO: ABNORMAL
BILIRUB UR QL STRIP: ABNORMAL
CLARITY UR: ABNORMAL
COLOR UR: YELLOW
EPI CELLS #/AREA URNS HPF: ABNORMAL /HPF
GLUCOSE UR STRIP-MCNC: NEGATIVE MG/DL
HGB UR QL STRIP.AUTO: ABNORMAL
KETONES UR STRIP-MCNC: 15 MG/DL
LEUKOCYTE ESTERASE UR QL STRIP: ABNORMAL
NITRITE UR QL STRIP: POSITIVE
PH UR STRIP: 5.5 [PH] (ref 5–9)
PROT UR STRIP-MCNC: 100 MG/DL
RBC #/AREA URNS HPF: ABNORMAL /HPF
SP GR UR STRIP: >1.03 (ref 1–1.03)
UROBILINOGEN UR STRIP-ACNC: 1 EU/DL (ref 0–1)
WBC #/AREA URNS HPF: ABNORMAL /HPF

## 2024-10-06 PROCEDURE — 99283 EMERGENCY DEPT VISIT LOW MDM: CPT

## 2024-10-06 PROCEDURE — 81001 URINALYSIS AUTO W/SCOPE: CPT

## 2024-10-06 RX ORDER — CIPROFLOXACIN 500 MG/1
500 TABLET, FILM COATED ORAL 2 TIMES DAILY
Qty: 14 TABLET | Refills: 0 | Status: SHIPPED | OUTPATIENT
Start: 2024-10-06 | End: 2024-10-13

## 2024-10-06 ASSESSMENT — PAIN - FUNCTIONAL ASSESSMENT: PAIN_FUNCTIONAL_ASSESSMENT: NONE - DENIES PAIN

## 2024-10-06 NOTE — ED PROVIDER NOTES
Urgent Care Encounter       Patient: Barb Judd  MRN: [id number]  : 1970  Date of Evaluation: 10/6/2024  ED Provider: Cecy Acevedo MD    Chief Complaint       Chief Complaint   Patient presents with    Urinary Tract Infection     Increased frequency of urination, hesitenceyy times 1 week.      Circle     Barb Judd is a 54 y.o. female who presents to the [facility] (Urgent care Facility)    Patient states that they have dysuria, burning with urination feeling. Patient does NOT have any complaints of   -No polyuria   -No polydipsia   -No hematuria  -No Fever  -No Suprapubic tenderness    States she feels like this is UTI and always gets the same feeling. Wants to be treated. NOT concerned about STD        ROS:     At least 5 systems reviewed and otherwise acutely negative except as in the Circle.  Review of Systems   Genitourinary:  Positive for dysuria. Negative for decreased urine volume, difficulty urinating, dyspareunia, enuresis, flank pain, frequency, genital sores, hematuria, menstrual problem, pelvic pain, urgency, vaginal bleeding, vaginal discharge and vaginal pain.         Past History     Past Medical History:   Diagnosis Date    Depression     Hyperlipidemia     Hypertension     Morbid (severe) obesity due to excess calories     Thyroid disease      Past Surgical History:   Procedure Laterality Date    CARDIAC CATHETERIZATION      CARPAL TUNNEL RELEASE      MARI-EN-Y GASTRIC BYPASS  11/15/2021    MARI-EN-Y GASTRIC BYPASS N/A 11/15/2021    GASTRIC BYPASS MARI-EN-Y LAPAROSCOPIC, HIATAL HERNIA REPAIR performed by Laureano Smith MD at [facility] OR    TONSILLECTOMY AND ADENOIDECTOMY      UPPER GASTROINTESTINAL ENDOSCOPY N/A 3/12/2021    EGD BIOPSY performed by Laureano Smith MD at [facility] ENDOSCOPY     Social History     Socioeconomic History    Marital status:      Spouse name: None    Number of children: 4    Years of education: None    Highest education level:

## 2024-10-14 ENCOUNTER — TELEPHONE (OUTPATIENT)
Dept: FAMILY MEDICINE CLINIC | Age: 54
End: 2024-10-14

## 2024-10-14 NOTE — TELEPHONE ENCOUNTER
Attempted to reach out to the pt lm for them to call the office back and get scheduled for A1C check.    Electronically signed by NIKKO GARCIA MA on 10/14/2024 at 12:39 PM

## 2024-10-15 ENCOUNTER — OFFICE VISIT (OUTPATIENT)
Dept: BARIATRICS/WEIGHT MGMT | Age: 54
End: 2024-10-15
Payer: COMMERCIAL

## 2024-10-15 VITALS
BODY MASS INDEX: 34.15 KG/M2 | HEART RATE: 67 BPM | SYSTOLIC BLOOD PRESSURE: 110 MMHG | DIASTOLIC BLOOD PRESSURE: 64 MMHG | WEIGHT: 200 LBS | HEIGHT: 64 IN

## 2024-10-15 DIAGNOSIS — E66.811 CLASS 1 OBESITY DUE TO EXCESS CALORIES WITH BODY MASS INDEX (BMI) OF 34.0 TO 34.9 IN ADULT, UNSPECIFIED WHETHER SERIOUS COMORBIDITY PRESENT: Primary | ICD-10-CM

## 2024-10-15 DIAGNOSIS — Z71.3 DIETARY COUNSELING: ICD-10-CM

## 2024-10-15 DIAGNOSIS — E66.09 CLASS 1 OBESITY DUE TO EXCESS CALORIES WITH BODY MASS INDEX (BMI) OF 34.0 TO 34.9 IN ADULT, UNSPECIFIED WHETHER SERIOUS COMORBIDITY PRESENT: Primary | ICD-10-CM

## 2024-10-15 DIAGNOSIS — K91.2 MALNUTRITION FOLLOWING GASTROINTESTINAL SURGERY: ICD-10-CM

## 2024-10-15 PROCEDURE — G8427 DOCREV CUR MEDS BY ELIG CLIN: HCPCS | Performed by: NURSE PRACTITIONER

## 2024-10-15 PROCEDURE — G8484 FLU IMMUNIZE NO ADMIN: HCPCS | Performed by: NURSE PRACTITIONER

## 2024-10-15 PROCEDURE — 3074F SYST BP LT 130 MM HG: CPT | Performed by: NURSE PRACTITIONER

## 2024-10-15 PROCEDURE — 99211 OFF/OP EST MAY X REQ PHY/QHP: CPT

## 2024-10-15 PROCEDURE — G8417 CALC BMI ABV UP PARAM F/U: HCPCS | Performed by: NURSE PRACTITIONER

## 2024-10-15 PROCEDURE — 99213 OFFICE O/P EST LOW 20 MIN: CPT | Performed by: NURSE PRACTITIONER

## 2024-10-15 PROCEDURE — 3017F COLORECTAL CA SCREEN DOC REV: CPT | Performed by: NURSE PRACTITIONER

## 2024-10-15 PROCEDURE — 3078F DIAST BP <80 MM HG: CPT | Performed by: NURSE PRACTITIONER

## 2024-10-15 PROCEDURE — 4004F PT TOBACCO SCREEN RCVD TLK: CPT | Performed by: NURSE PRACTITIONER

## 2024-10-15 RX ORDER — TOPIRAMATE 50 MG/1
50 TABLET, FILM COATED ORAL DAILY
Qty: 30 TABLET | Refills: 2 | Status: SHIPPED | OUTPATIENT
Start: 2024-10-15 | End: 2025-01-13

## 2024-10-15 ASSESSMENT — ENCOUNTER SYMPTOMS
DIARRHEA: 0
CONSTIPATION: 0
SHORTNESS OF BREATH: 0
NAUSEA: 0
COUGH: 0
WHEEZING: 0
VOMITING: 0

## 2024-10-15 NOTE — TELEPHONE ENCOUNTER
2nd Attempt to reach out to the pt lm for them to call the office back and get scheduled for A1C check.    Electronically signed by NIKKO GARCIA MA on 10/15/2024 at 9:24 AM

## 2024-10-15 NOTE — PATIENT INSTRUCTIONS
Please continue to take your vitamin and mineral supplements as instructed.      If you received a blood work prescription today for laboratory monitoring due prior to your next routine follow-up visit, please have this blood work obtained 10 to 14 days prior to your next visit.  It is important to fast for 12 hours prior to routine weight loss surgery blood work, EXCEPT for drinking water, to ensure accuracy of results.    Please report nausea, vomiting, abdominal pain, or any other problems you experience to your surgeon.  For problems related to weight loss surgery, it is best to go to [facility] Emergency Department and have your surgeon paged.    Last Surgical Weight Loss:      11/13/2023    12:59 PM 12/12/2023     1:19 PM 1/11/2024     1:40 PM 6/18/2024     3:20 PM 8/23/2024     2:02 PM 9/17/2024     2:33 PM 10/15/2024     2:42 PM   Surgical Weight Loss Tracker   Consult Date 2/17/2021 2/17/2021     Initial Height 5' 4\"    5' 4\"     Initial Weight 250 lb    250 lb     Initial BMI 42.91    42.91     Ideal Body Weight 151 lb    151 lb     Surgery Date 11/15/2021    11/15/2021     Pre-Surgical Height 5' 4\"    5' 4\"     Pre-Surgical Weight 246 lb    246 lb     Pre Surgery BMI 42.22    42.22     Weight to Lose 95 lb    95 lb     Date 11/13/2023 12/12/2023 1/11/2024 6/18/2024 8/23/2024 9/17/2024 10/15/2024   Height 5' 4\" 5' 4\" 5' 4\" 5' 4\" 5' 4\" 5' 4\" 5' 4\"   Weight 198 lb 196 lb 194 lb 196 lb 195 lb 200 lb 200 lb   BMI 33.98 33.64 33.3 33.64 33.47 34.33 34.33   Weight Change -48 lb -2 lb -2 lb 2 lb -1 lb 5 lb 0 lb   Total Weight Change -48 lb -50 lb -52 lb -50 lb -51 lb -46 lb -46 lb   % EBWL 51% 53% 55% 53% 54% 48% 48%

## 2024-10-15 NOTE — PROGRESS NOTES
Chief Complaint   Patient presents with    Medication Check       HPI:  Patient presents today for follow up of topamax. She reports that she does not think that the medication is strong enough. She denies any negative side effects, however does not note any appetite suppression at this dose. She has not changed her eating habits. She is trying to walk a few times per week for 30-45 minutes at a time. She reports that her appetite is worse in the evening.     11/13/2023 - 198 lbs  12/12/2023 - 196 lbs  1/11/2024 - 194 lbs  6/18/2024 - 204 lbs - restart phentermine  7/25/2024 - 200 lb  8/23/2024 - 196 lb - down 8 lbs in 60 days  9/17/2024 - 200 lb - topamax 25 mg daily  10/15/2024 - 200 lb - increased topamax to 50 mg daily    Breakfast: yogurt - greek   Snack: none  Lunch: none  Dinner: 1 grilled cheese sandwich, 1 c tomato soup  Snack: none  Water: 100 ounces  Other beverages: coffee - 2 cups with stevia and creamer  Exercise: none    Prior to Visit Medications    Medication Sig Taking? Authorizing Provider   OXcarbazepine (TRILEPTAL) 300 MG tablet Take 1 tablet by mouth 2 times daily Yes Provider, MD Candice   meloxicam (MOBIC) 7.5 MG tablet Take 1 tablet by mouth daily Yes Mitzy Person APRN - CNP   cyclobenzaprine (FLEXERIL) 10 MG tablet Take 1 tablet by mouth 3 times daily as needed (muscle spasms) Yes Mitzy Person APRN - CNP   Wheat Dextrin (BENEFIBER) POWD Take 4 g by mouth 3 times daily (with meals) Yes Mitzy Person APRN - CNP   pravastatin (PRAVACHOL) 80 MG tablet Take 1 tablet by mouth daily Yes Lacey Concepcion APRN - CNP   lisinopril (PRINIVIL;ZESTRIL) 5 MG tablet Take 1 tablet by mouth daily Yes Lacey Concepcion APRN - CNP   levothyroxine (SYNTHROID) 25 MCG tablet Take 1 tablet by mouth daily Yes Lacey Concepcion APRN - CNP   topiramate (TOPAMAX) 25 MG tablet Take 1 tablet by mouth nightly Yes Concepcion, Lacey E, APRN - CNP   cariprazine hcl (VRAYLAR) 1.5 MG capsule Take 1

## 2024-10-16 NOTE — TELEPHONE ENCOUNTER
3rd attempt to reach out to the pt lm for them to call the office back and get scheduled for A1C check.       Letter sent    Electronically signed by NIKKO GARCIA MA on 10/16/2024 at 9:14 AM

## 2024-10-18 ENCOUNTER — TELEPHONE (OUTPATIENT)
Dept: FAMILY MEDICINE CLINIC | Age: 54
End: 2024-10-18

## 2024-10-18 NOTE — TELEPHONE ENCOUNTER
Called pt regarding her colonoscopy pt states that it is not covered by her insurance.    Electronically signed by NIKKO GARCIA MA on 10/18/2024 at 1:01 PM

## 2024-10-22 DIAGNOSIS — F17.210 CIGARETTE NICOTINE DEPENDENCE WITHOUT COMPLICATION: Primary | ICD-10-CM

## 2024-10-22 RX ORDER — VARENICLINE TARTRATE 0.5 MG/1
.5-1 TABLET, FILM COATED ORAL SEE ADMIN INSTRUCTIONS
Qty: 57 TABLET | Refills: 0 | Status: SHIPPED | OUTPATIENT
Start: 2024-10-22

## 2024-11-11 DIAGNOSIS — K91.2 MALNUTRITION FOLLOWING GASTROINTESTINAL SURGERY: ICD-10-CM

## 2024-11-11 LAB
HCT VFR BLD CALC: 43.9 % (ref 34–48)
HEMOGLOBIN: 14 G/DL (ref 11.5–15.5)
MCH RBC QN AUTO: 29.4 PG (ref 26–35)
MCHC RBC AUTO-ENTMCNC: 31.9 G/DL (ref 32–34.5)
MCV RBC AUTO: 92 FL (ref 80–99.9)
PDW BLD-RTO: 14.2 % (ref 11.5–15)
PLATELET # BLD: 366 K/UL (ref 130–450)
PMV BLD AUTO: 9.3 FL (ref 7–12)
RBC # BLD: 4.77 M/UL (ref 3.5–5.5)
WBC # BLD: 8.8 K/UL (ref 4.5–11.5)

## 2024-11-12 LAB
ALBUMIN: 4.2 G/DL (ref 3.5–5.2)
ALP BLD-CCNC: 110 U/L (ref 35–104)
ALT SERPL-CCNC: 10 U/L (ref 0–32)
ANION GAP SERPL CALCULATED.3IONS-SCNC: 11 MMOL/L (ref 7–16)
AST SERPL-CCNC: 16 U/L (ref 0–31)
BILIRUB SERPL-MCNC: 0.4 MG/DL (ref 0–1.2)
BUN BLDV-MCNC: 8 MG/DL (ref 6–20)
CALCIUM SERPL-MCNC: 9.7 MG/DL (ref 8.6–10.2)
CHLORIDE BLD-SCNC: 105 MMOL/L (ref 98–107)
CHOLESTEROL, TOTAL: 170 MG/DL
CO2: 24 MMOL/L (ref 22–29)
CREAT SERPL-MCNC: 0.9 MG/DL (ref 0.5–1)
FERRITIN: 39 NG/ML
FOLATE: 6.9 NG/ML (ref 4.8–24.2)
GFR, ESTIMATED: 75 ML/MIN/1.73M2
GLUCOSE BLD-MCNC: 81 MG/DL (ref 74–99)
HDLC SERPL-MCNC: 54 MG/DL
LDL CHOLESTEROL: 90 MG/DL
POTASSIUM SERPL-SCNC: 4.7 MMOL/L (ref 3.5–5)
PREALBUMIN: 20 MG/DL (ref 20–40)
SODIUM BLD-SCNC: 140 MMOL/L (ref 132–146)
TOTAL PROTEIN: 7 G/DL (ref 6.4–8.3)
TRIGL SERPL-MCNC: 128 MG/DL
TSH SERPL DL<=0.05 MIU/L-ACNC: 2.96 UIU/ML (ref 0.27–4.2)
VITAMIN B-12: 379 PG/ML (ref 211–946)
VITAMIN D 25-HYDROXY: 55.2 NG/ML (ref 30–100)
VLDLC SERPL CALC-MCNC: 26 MG/DL

## 2024-11-13 LAB — COPPER: 116.1 UG/DL (ref 80–155)

## 2024-11-14 ENCOUNTER — OFFICE VISIT (OUTPATIENT)
Dept: BARIATRICS/WEIGHT MGMT | Age: 54
End: 2024-11-14
Payer: COMMERCIAL

## 2024-11-14 VITALS
HEART RATE: 85 BPM | DIASTOLIC BLOOD PRESSURE: 78 MMHG | WEIGHT: 196 LBS | SYSTOLIC BLOOD PRESSURE: 122 MMHG | BODY MASS INDEX: 33.46 KG/M2 | HEIGHT: 64 IN

## 2024-11-14 DIAGNOSIS — E66.09 CLASS 1 OBESITY DUE TO EXCESS CALORIES WITH BODY MASS INDEX (BMI) OF 34.0 TO 34.9 IN ADULT, UNSPECIFIED WHETHER SERIOUS COMORBIDITY PRESENT: ICD-10-CM

## 2024-11-14 DIAGNOSIS — E66.811 CLASS 1 OBESITY DUE TO EXCESS CALORIES WITH BODY MASS INDEX (BMI) OF 34.0 TO 34.9 IN ADULT, UNSPECIFIED WHETHER SERIOUS COMORBIDITY PRESENT: ICD-10-CM

## 2024-11-14 DIAGNOSIS — K91.2 MALNUTRITION FOLLOWING GASTROINTESTINAL SURGERY: Primary | ICD-10-CM

## 2024-11-14 LAB — ZINC: 64.8 UG/DL (ref 60–120)

## 2024-11-14 PROCEDURE — 3074F SYST BP LT 130 MM HG: CPT | Performed by: NURSE PRACTITIONER

## 2024-11-14 PROCEDURE — 4004F PT TOBACCO SCREEN RCVD TLK: CPT | Performed by: NURSE PRACTITIONER

## 2024-11-14 PROCEDURE — 3078F DIAST BP <80 MM HG: CPT | Performed by: NURSE PRACTITIONER

## 2024-11-14 PROCEDURE — 3017F COLORECTAL CA SCREEN DOC REV: CPT | Performed by: NURSE PRACTITIONER

## 2024-11-14 PROCEDURE — G8484 FLU IMMUNIZE NO ADMIN: HCPCS | Performed by: NURSE PRACTITIONER

## 2024-11-14 PROCEDURE — 99213 OFFICE O/P EST LOW 20 MIN: CPT | Performed by: NURSE PRACTITIONER

## 2024-11-14 PROCEDURE — G8417 CALC BMI ABV UP PARAM F/U: HCPCS | Performed by: NURSE PRACTITIONER

## 2024-11-14 PROCEDURE — G8427 DOCREV CUR MEDS BY ELIG CLIN: HCPCS | Performed by: NURSE PRACTITIONER

## 2024-11-14 PROCEDURE — 99211 OFF/OP EST MAY X REQ PHY/QHP: CPT

## 2024-11-14 RX ORDER — TOPIRAMATE 100 MG/1
100 TABLET, FILM COATED ORAL DAILY
Qty: 30 TABLET | Refills: 2 | Status: SHIPPED | OUTPATIENT
Start: 2024-11-14 | End: 2025-02-12

## 2024-11-14 NOTE — PROGRESS NOTES
Barb Judd  11/14/2024  [facility]    Randy-en- Y Gastric Bypass  3 year Post-Operative Follow-up     Chief Complaint   Patient presents with    Bariatrics Post Op Follow Up     LRYGB 3 yr po. Water intake 64oz daily. Protein through food. Vitamins daily. Normal bm        Barb Judd is a 54 y.o. female who is 3 years post Laparoscopic Randy-en-Y Gastric Bypass surgery.  Reports that she does like the topamax we started her on 9/24. She takes 50 mg q hs, tolerating this well. She is not having swallowing difficulty. Patient denies nausea and vomiting. Patient denies gastric reflux symptoms. Bowel movements are  normal per patient. Patient is taking fiber supplements, which include benefiber and colace.     Patient is compliant most of the time with the Vit D, calcium, iron, MVI. She is meeting fluid recommendations of at least 64 ounces per day and is trying to meet protein recommendations. She  is exercising:  walks 20 minutes 3 times per week .     Last Surgical Weight Loss:      12/12/2023     1:19 PM 1/11/2024     1:40 PM 6/18/2024     3:20 PM 8/23/2024     2:02 PM 9/17/2024     2:33 PM 10/15/2024     2:42 PM 11/14/2024     1:40 PM   Surgical Weight Loss Tracker   Consult Date    2/17/2021      Initial Height    5' 4\"      Initial Weight    250 lb      Initial BMI    42.91      Ideal Body Weight    151 lb      Surgery Date    11/15/2021      Pre-Surgical Height    5' 4\"      Pre-Surgical Weight    246 lb      Pre Surgery BMI    42.22      Weight to Lose    95 lb      Date 12/12/2023 1/11/2024 6/18/2024 8/23/2024 9/17/2024 10/15/2024 11/14/2024   Height 5' 4\" 5' 4\" 5' 4\" 5' 4\" 5' 4\" 5' 4\" 5' 4\"   Weight 196 lb 194 lb 196 lb 195 lb 200 lb 200 lb 196 lb   BMI 33.64 33.3 33.64 33.47 34.33 34.33 33.64   Weight Change -2 lb -2 lb 2 lb -1 lb 5 lb 0 lb -4 lb   Total Weight Change -50 lb -52 lb -50 lb -51 lb -46 lb -46 lb -50 lb   % EBWL 53% 55% 53% 54% 48% 48% 53%       Weight=88.9 kg (196

## 2024-11-14 NOTE — PATIENT INSTRUCTIONS
Please continue to take your vitamin and mineral supplements as instructed.      If you received a blood work prescription today for laboratory monitoring due prior to your next routine follow-up visit, please have this blood work obtained 10 to 14 days prior to your next visit.  It is important to fast for 12 hours prior to routine weight loss surgery blood work, EXCEPT for drinking water, to ensure accuracy of results.    Please report nausea, vomiting, abdominal pain, or any other problems you experience to your surgeon.  For problems related to weight loss surgery, it is best to go to [facility] Emergency Department and have your surgeon paged.    Last Surgical Weight Loss:      12/12/2023     1:19 PM 1/11/2024     1:40 PM 6/18/2024     3:20 PM 8/23/2024     2:02 PM 9/17/2024     2:33 PM 10/15/2024     2:42 PM 11/14/2024     1:40 PM   Surgical Weight Loss Tracker   Consult Date    2/17/2021      Initial Height    5' 4\"      Initial Weight    250 lb      Initial BMI    42.91      Ideal Body Weight    151 lb      Surgery Date    11/15/2021      Pre-Surgical Height    5' 4\"      Pre-Surgical Weight    246 lb      Pre Surgery BMI    42.22      Weight to Lose    95 lb      Date 12/12/2023 1/11/2024 6/18/2024 8/23/2024 9/17/2024 10/15/2024 11/14/2024   Height 5' 4\" 5' 4\" 5' 4\" 5' 4\" 5' 4\" 5' 4\" 5' 4\"   Weight 196 lb 194 lb 196 lb 195 lb 200 lb 200 lb 196 lb   BMI 33.64 33.3 33.64 33.47 34.33 34.33 33.64   Weight Change -2 lb -2 lb 2 lb -1 lb 5 lb 0 lb -4 lb   Total Weight Change -50 lb -52 lb -50 lb -51 lb -46 lb -46 lb -50 lb   % EBWL 53% 55% 53% 54% 48% 48% 53%

## 2024-11-15 LAB
RETINYL PALMITATE: <0.02 MG/L (ref 0–0.1)
VITAMIN A LEVEL: 0.49 MG/L (ref 0.3–1.2)
VITAMIN A, INTERP: NORMAL

## 2024-11-17 LAB — VITAMIN B1 WHOLE BLOOD: 115 NMOL/L (ref 70–180)

## 2024-11-24 DIAGNOSIS — I10 ESSENTIAL HYPERTENSION: ICD-10-CM

## 2024-11-24 DIAGNOSIS — K91.2 MALNUTRITION FOLLOWING GASTROINTESTINAL SURGERY: ICD-10-CM

## 2024-11-24 DIAGNOSIS — E78.5 HYPERLIPIDEMIA, UNSPECIFIED HYPERLIPIDEMIA TYPE: ICD-10-CM

## 2024-11-26 RX ORDER — LEVOTHYROXINE SODIUM 25 UG/1
25 TABLET ORAL DAILY
Qty: 30 TABLET | Refills: 1 | OUTPATIENT
Start: 2024-11-26

## 2024-11-26 RX ORDER — PRAVASTATIN SODIUM 80 MG/1
80 TABLET ORAL DAILY
Qty: 30 TABLET | Refills: 1 | OUTPATIENT
Start: 2024-11-26

## 2024-11-26 RX ORDER — LISINOPRIL 5 MG/1
5 TABLET ORAL DAILY
Qty: 30 TABLET | Refills: 2 | OUTPATIENT
Start: 2024-11-26 | End: 2025-02-24

## 2024-12-10 DIAGNOSIS — K91.2 MALNUTRITION FOLLOWING GASTROINTESTINAL SURGERY: ICD-10-CM

## 2024-12-10 DIAGNOSIS — E78.5 HYPERLIPIDEMIA, UNSPECIFIED HYPERLIPIDEMIA TYPE: ICD-10-CM

## 2024-12-16 DIAGNOSIS — I10 ESSENTIAL HYPERTENSION: ICD-10-CM

## 2024-12-16 DIAGNOSIS — E78.5 HYPERLIPIDEMIA, UNSPECIFIED HYPERLIPIDEMIA TYPE: ICD-10-CM

## 2024-12-16 DIAGNOSIS — K91.2 MALNUTRITION FOLLOWING GASTROINTESTINAL SURGERY: ICD-10-CM

## 2024-12-16 RX ORDER — LISINOPRIL 5 MG/1
5 TABLET ORAL DAILY
Qty: 30 TABLET | Refills: 0 | Status: SHIPPED | OUTPATIENT
Start: 2024-12-16 | End: 2025-01-15

## 2024-12-16 RX ORDER — LISINOPRIL 5 MG/1
5 TABLET ORAL DAILY
Qty: 30 TABLET | Refills: 2 | Status: CANCELLED | OUTPATIENT
Start: 2024-12-16 | End: 2025-03-16

## 2024-12-16 RX ORDER — PRAVASTATIN SODIUM 80 MG/1
80 TABLET ORAL DAILY
Qty: 30 TABLET | Refills: 1 | Status: CANCELLED | OUTPATIENT
Start: 2024-12-16

## 2024-12-16 RX ORDER — PRAVASTATIN SODIUM 80 MG/1
80 TABLET ORAL DAILY
Qty: 30 TABLET | Refills: 0 | Status: SHIPPED | OUTPATIENT
Start: 2024-12-16

## 2024-12-16 RX ORDER — LEVOTHYROXINE SODIUM 25 UG/1
25 TABLET ORAL DAILY
Qty: 30 TABLET | Refills: 0 | Status: SHIPPED | OUTPATIENT
Start: 2024-12-16

## 2024-12-16 RX ORDER — PRAVASTATIN SODIUM 80 MG/1
80 TABLET ORAL DAILY
Qty: 30 TABLET | Refills: 0 | OUTPATIENT
Start: 2024-12-16

## 2024-12-16 RX ORDER — LEVOTHYROXINE SODIUM 25 UG/1
25 TABLET ORAL DAILY
Qty: 30 TABLET | Refills: 0 | OUTPATIENT
Start: 2024-12-16

## 2024-12-16 RX ORDER — LEVOTHYROXINE SODIUM 25 UG/1
25 TABLET ORAL DAILY
Qty: 30 TABLET | Refills: 1 | Status: CANCELLED | OUTPATIENT
Start: 2024-12-16

## 2024-12-16 NOTE — TELEPHONE ENCOUNTER
Name of Medication(s) Requested:  Requested Prescriptions     Pending Prescriptions Disp Refills    lisinopril (PRINIVIL;ZESTRIL) 5 MG tablet 30 tablet 2     Sig: Take 1 tablet by mouth daily    metFORMIN (GLUCOPHAGE) 500 MG tablet 180 tablet 1     Sig: Take 1 tablet by mouth 2 times daily (with meals)    pravastatin (PRAVACHOL) 80 MG tablet 30 tablet 1     Sig: Take 1 tablet by mouth daily    levothyroxine (SYNTHROID) 25 MCG tablet 30 tablet 1     Sig: Take 1 tablet by mouth daily       Medication is on current medication list Yes    Dosage and directions were verified? Yes    Quantity verified: 30 day supply     Pharmacy Verified?  Yes    Last Appointment:  9/24/2024    Future appts:  Future Appointments   Date Time Provider Department Center   2/13/2025  1:45 PM Lacey Concepcion APRN - CNP Surg Weight [facility]   3/25/2025  1:00 PM Mitzy Person APRN - CNP [facility]   11/13/2025  1:30 PM Lacey Concepcion APRN - CNP Surg Weight [facility]        (If no appt send self scheduling link. .REFILLAPPT)  Scheduling request sent?     [] Yes  [x] No    Does patient need updated?  [] Yes  [x] No

## 2024-12-18 ENCOUNTER — HOSPITAL ENCOUNTER (EMERGENCY)
Age: 54
Discharge: HOME | End: 2024-12-18
Attending: EMERGENCY MEDICINE
Payer: COMMERCIAL

## 2024-12-18 VITALS
RESPIRATION RATE: 16 BRPM | TEMPERATURE: 97.3 F | SYSTOLIC BLOOD PRESSURE: 148 MMHG | OXYGEN SATURATION: 97 % | DIASTOLIC BLOOD PRESSURE: 86 MMHG | HEART RATE: 86 BPM | BODY MASS INDEX: 34.33 KG/M2 | WEIGHT: 200 LBS

## 2024-12-18 DIAGNOSIS — J06.9 ACUTE UPPER RESPIRATORY INFECTION: ICD-10-CM

## 2024-12-18 DIAGNOSIS — N39.0 URINARY TRACT INFECTION WITHOUT HEMATURIA, SITE UNSPECIFIED: Primary | ICD-10-CM

## 2024-12-18 LAB
BACTERIA URNS QL MICRO: ABNORMAL
BILIRUB UR QL STRIP: NEGATIVE
CLARITY UR: ABNORMAL
COLOR UR: YELLOW
EPI CELLS #/AREA URNS HPF: ABNORMAL /HPF
GLUCOSE UR STRIP-MCNC: NEGATIVE MG/DL
HGB UR QL STRIP.AUTO: ABNORMAL
KETONES UR STRIP-MCNC: NEGATIVE MG/DL
LEUKOCYTE ESTERASE UR QL STRIP: ABNORMAL
NITRITE UR QL STRIP: NEGATIVE
PH UR STRIP: 6.5 [PH] (ref 5–9)
PROT UR STRIP-MCNC: ABNORMAL MG/DL
RBC #/AREA URNS HPF: ABNORMAL /HPF
SP GR UR STRIP: 1.02 (ref 1–1.03)
UROBILINOGEN UR STRIP-ACNC: 1 EU/DL (ref 0–1)
WBC #/AREA URNS HPF: ABNORMAL /HPF

## 2024-12-18 PROCEDURE — 99283 EMERGENCY DEPT VISIT LOW MDM: CPT

## 2024-12-18 PROCEDURE — 81001 URINALYSIS AUTO W/SCOPE: CPT

## 2024-12-18 RX ORDER — CEFDINIR 300 MG/1
300 CAPSULE ORAL 2 TIMES DAILY
Qty: 20 CAPSULE | Refills: 0 | Status: SHIPPED | OUTPATIENT
Start: 2024-12-18 | End: 2024-12-28

## 2024-12-18 RX ORDER — BROMPHENIRAMINE MALEATE, PSEUDOEPHEDRINE HYDROCHLORIDE, AND DEXTROMETHORPHAN HYDROBROMIDE 2; 30; 10 MG/5ML; MG/5ML; MG/5ML
5 SYRUP ORAL 4 TIMES DAILY PRN
Qty: 120 ML | Refills: 0 | Status: SHIPPED | OUTPATIENT
Start: 2024-12-18

## 2024-12-18 RX ORDER — PHENAZOPYRIDINE HYDROCHLORIDE 100 MG/1
100 TABLET, FILM COATED ORAL 3 TIMES DAILY PRN
Qty: 6 TABLET | Refills: 0 | Status: SHIPPED | OUTPATIENT
Start: 2024-12-18 | End: 2024-12-21

## 2024-12-18 ASSESSMENT — ENCOUNTER SYMPTOMS
SORE THROAT: 0
NAUSEA: 0
EYE PAIN: 0
VOMITING: 0
EYE REDNESS: 0
SHORTNESS OF BREATH: 0
DIARRHEA: 0
EYE DISCHARGE: 0
ABDOMINAL DISTENTION: 0
SINUS PRESSURE: 0
COUGH: 1
RHINORRHEA: 1
BACK PAIN: 0
WHEEZING: 0

## 2024-12-18 NOTE — ED PROVIDER NOTES
The history is provided by the patient.   Dysuria   This is a new problem. The current episode started more than 2 days ago. The problem occurs every urination. The quality of the pain is described as burning. The pain is mild. There has been no fever. Associated symptoms include frequency and urgency. Pertinent negatives include no chills, no nausea and no vomiting.   Cold Symptoms  Presenting symptoms: congestion, cough and rhinorrhea    Presenting symptoms: no ear pain, no fatigue, no fever and no sore throat    Severity:  Moderate  Onset quality:  Gradual  Duration:  5 days  Chronicity:  New  Associated symptoms: no arthralgias, no headaches and no wheezing         Review of Systems   Constitutional:  Negative for chills, fatigue and fever.   HENT:  Positive for congestion and rhinorrhea. Negative for ear pain, sinus pressure and sore throat.    Eyes:  Negative for pain, discharge and redness.   Respiratory:  Positive for cough. Negative for shortness of breath and wheezing.    Cardiovascular:  Negative for chest pain.   Gastrointestinal:  Negative for abdominal distention, diarrhea, nausea and vomiting.   Genitourinary:  Positive for dysuria, frequency and urgency.   Musculoskeletal:  Negative for arthralgias and back pain.   Skin:  Negative for rash and wound.   Neurological:  Negative for weakness and headaches.   Hematological:  Negative for adenopathy.   All other systems reviewed and are negative.       Physical Exam  Vitals and nursing note reviewed.   Constitutional:       Appearance: She is well-developed.   HENT:      Head: Normocephalic and atraumatic.      Right Ear: Tympanic membrane is retracted.      Left Ear: Tympanic membrane is retracted.      Nose: Mucosal edema and congestion present.   Eyes:      Pupils: Pupils are equal, round, and reactive to light.   Cardiovascular:      Rate and Rhythm: Normal rate and regular rhythm.      Heart sounds: Normal heart sounds. No murmur heard.  Pulmonary:

## 2025-01-07 DIAGNOSIS — B85.2 LICE: Primary | ICD-10-CM

## 2025-01-07 RX ORDER — PERMETHRIN 1 %-0.25 %
1 COMBINATION PACKAGE (ML) MISCELLANEOUS ONCE
Qty: 1 KIT | Refills: 0 | Status: SHIPPED | OUTPATIENT
Start: 2025-01-07 | End: 2025-01-07

## 2025-01-16 DIAGNOSIS — E66.811 CLASS 1 OBESITY DUE TO EXCESS CALORIES WITH BODY MASS INDEX (BMI) OF 34.0 TO 34.9 IN ADULT, UNSPECIFIED WHETHER SERIOUS COMORBIDITY PRESENT: Primary | ICD-10-CM

## 2025-01-16 DIAGNOSIS — E66.09 CLASS 1 OBESITY DUE TO EXCESS CALORIES WITH BODY MASS INDEX (BMI) OF 34.0 TO 34.9 IN ADULT, UNSPECIFIED WHETHER SERIOUS COMORBIDITY PRESENT: Primary | ICD-10-CM

## 2025-01-16 RX ORDER — TOPIRAMATE 100 MG/1
200 TABLET, FILM COATED ORAL DAILY
Qty: 60 TABLET | Refills: 2 | Status: SHIPPED | OUTPATIENT
Start: 2025-01-16 | End: 2025-04-16

## 2025-01-19 DIAGNOSIS — M54.41 CHRONIC BILATERAL LOW BACK PAIN WITH BILATERAL SCIATICA: ICD-10-CM

## 2025-01-19 DIAGNOSIS — M54.42 CHRONIC BILATERAL LOW BACK PAIN WITH BILATERAL SCIATICA: ICD-10-CM

## 2025-01-19 DIAGNOSIS — E78.5 HYPERLIPIDEMIA, UNSPECIFIED HYPERLIPIDEMIA TYPE: ICD-10-CM

## 2025-01-19 DIAGNOSIS — G89.29 CHRONIC BILATERAL LOW BACK PAIN WITH BILATERAL SCIATICA: ICD-10-CM

## 2025-01-19 DIAGNOSIS — K91.2 MALNUTRITION FOLLOWING GASTROINTESTINAL SURGERY: ICD-10-CM

## 2025-01-20 RX ORDER — MELOXICAM 7.5 MG/1
7.5 TABLET ORAL DAILY
Qty: 30 TABLET | Refills: 0 | Status: SHIPPED
Start: 2025-01-20 | End: 2025-01-21 | Stop reason: SDUPTHER

## 2025-01-20 NOTE — TELEPHONE ENCOUNTER
Name of Medication(s) Requested:  Requested Prescriptions     Pending Prescriptions Disp Refills    meloxicam (MOBIC) 7.5 MG tablet [Pharmacy Med Name: Meloxicam Oral Tablet 7.5 MG] 30 tablet 0     Sig: TAKE ONE TABLET BY MOUTH DAILY       Medication is on current medication list Yes    Dosage and directions were verified? Yes    Quantity verified: 30 day supply     Pharmacy Verified?  Yes    Last Appointment:  9/24/2024    Future appts:  Future Appointments   Date Time Provider Department Center   2/13/2025  1:45 PM Lacey Concepcion APRN - CNP Surg Weight [facility]   3/25/2025  1:00 PM Mitzy Person APRN - CNP [facility]   11/13/2025  1:30 PM Lacey Concepcion APRN - CNP Surg Weight [facility]        (If no appt send self scheduling link. .REFILLAPPT)  Scheduling request sent?     [] Yes  [x] No    Does patient need updated?  [] Yes  [x] No

## 2025-01-21 DIAGNOSIS — K91.2 MALNUTRITION FOLLOWING GASTROINTESTINAL SURGERY: ICD-10-CM

## 2025-01-21 DIAGNOSIS — I10 ESSENTIAL HYPERTENSION: ICD-10-CM

## 2025-01-21 DIAGNOSIS — M62.838 MUSCLE SPASM: ICD-10-CM

## 2025-01-21 DIAGNOSIS — E78.5 HYPERLIPIDEMIA, UNSPECIFIED HYPERLIPIDEMIA TYPE: ICD-10-CM

## 2025-01-21 DIAGNOSIS — M54.42 CHRONIC BILATERAL LOW BACK PAIN WITH BILATERAL SCIATICA: ICD-10-CM

## 2025-01-21 DIAGNOSIS — M54.41 CHRONIC BILATERAL LOW BACK PAIN WITH BILATERAL SCIATICA: ICD-10-CM

## 2025-01-21 DIAGNOSIS — G89.29 CHRONIC BILATERAL LOW BACK PAIN WITH BILATERAL SCIATICA: ICD-10-CM

## 2025-01-21 RX ORDER — LISINOPRIL 5 MG/1
5 TABLET ORAL DAILY
Qty: 90 TABLET | Refills: 0 | Status: SHIPPED | OUTPATIENT
Start: 2025-01-21 | End: 2025-04-21

## 2025-01-21 RX ORDER — LEVOTHYROXINE SODIUM 25 UG/1
25 TABLET ORAL DAILY
Qty: 90 TABLET | Refills: 0 | Status: SHIPPED | OUTPATIENT
Start: 2025-01-21

## 2025-01-21 RX ORDER — LEVOTHYROXINE SODIUM 25 UG/1
25 TABLET ORAL DAILY
Qty: 30 TABLET | Refills: 0 | OUTPATIENT
Start: 2025-01-21

## 2025-01-21 RX ORDER — CYCLOBENZAPRINE HCL 10 MG
10 TABLET ORAL 3 TIMES DAILY PRN
Qty: 30 TABLET | Refills: 2 | Status: SHIPPED | OUTPATIENT
Start: 2025-01-21

## 2025-01-21 RX ORDER — PRAVASTATIN SODIUM 80 MG/1
80 TABLET ORAL DAILY
Qty: 30 TABLET | Refills: 0 | OUTPATIENT
Start: 2025-01-21

## 2025-01-21 RX ORDER — PRAVASTATIN SODIUM 80 MG/1
80 TABLET ORAL DAILY
Qty: 30 TABLET | Refills: 0 | Status: CANCELLED | OUTPATIENT
Start: 2025-01-21

## 2025-01-21 RX ORDER — LEVOTHYROXINE SODIUM 25 UG/1
25 TABLET ORAL DAILY
Qty: 30 TABLET | Refills: 0 | Status: CANCELLED | OUTPATIENT
Start: 2025-01-21

## 2025-01-21 RX ORDER — PRAVASTATIN SODIUM 80 MG/1
80 TABLET ORAL DAILY
Qty: 90 TABLET | Refills: 0 | Status: SHIPPED | OUTPATIENT
Start: 2025-01-21

## 2025-01-21 RX ORDER — MELOXICAM 7.5 MG/1
7.5 TABLET ORAL DAILY
Qty: 90 TABLET | Refills: 0 | Status: SHIPPED | OUTPATIENT
Start: 2025-01-21

## 2025-01-21 RX ORDER — LISINOPRIL 5 MG/1
5 TABLET ORAL DAILY
Qty: 30 TABLET | Refills: 0 | Status: CANCELLED | OUTPATIENT
Start: 2025-01-21 | End: 2025-02-20

## 2025-01-23 ENCOUNTER — TELEPHONE (OUTPATIENT)
Dept: FAMILY MEDICINE CLINIC | Age: 55
End: 2025-01-23

## 2025-01-23 NOTE — TELEPHONE ENCOUNTER
Called and left message for pt to call the office regarding her colonoscopy.    Electronically signed by NIKKO GARCIA MA on 1/23/2025 at 9:47 AM

## 2025-03-06 ENCOUNTER — OFFICE VISIT (OUTPATIENT)
Dept: BARIATRICS/WEIGHT MGMT | Age: 55
End: 2025-03-06
Payer: COMMERCIAL

## 2025-03-06 VITALS
BODY MASS INDEX: 33.12 KG/M2 | SYSTOLIC BLOOD PRESSURE: 116 MMHG | HEART RATE: 84 BPM | WEIGHT: 194 LBS | DIASTOLIC BLOOD PRESSURE: 64 MMHG | HEIGHT: 64 IN

## 2025-03-06 DIAGNOSIS — E66.811 CLASS 1 OBESITY DUE TO EXCESS CALORIES WITH BODY MASS INDEX (BMI) OF 34.0 TO 34.9 IN ADULT, UNSPECIFIED WHETHER SERIOUS COMORBIDITY PRESENT: Primary | ICD-10-CM

## 2025-03-06 DIAGNOSIS — E66.09 CLASS 1 OBESITY DUE TO EXCESS CALORIES WITH BODY MASS INDEX (BMI) OF 34.0 TO 34.9 IN ADULT, UNSPECIFIED WHETHER SERIOUS COMORBIDITY PRESENT: Primary | ICD-10-CM

## 2025-03-06 DIAGNOSIS — K91.2 MALNUTRITION FOLLOWING GASTROINTESTINAL SURGERY: ICD-10-CM

## 2025-03-06 PROCEDURE — 99211 OFF/OP EST MAY X REQ PHY/QHP: CPT

## 2025-03-06 RX ORDER — TOPIRAMATE 100 MG/1
200 TABLET, FILM COATED ORAL DAILY
Qty: 60 TABLET | Refills: 2 | Status: SHIPPED | OUTPATIENT
Start: 2025-03-06 | End: 2025-06-04

## 2025-03-06 ASSESSMENT — ENCOUNTER SYMPTOMS
DIARRHEA: 0
SHORTNESS OF BREATH: 0
COUGH: 0
CONSTIPATION: 0
NAUSEA: 0
WHEEZING: 0
VOMITING: 0

## 2025-03-06 NOTE — PATIENT INSTRUCTIONS
Please continue to take your vitamin and mineral supplements as instructed.      If you received a blood work prescription today for laboratory monitoring due prior to your next routine follow-up visit, please have this blood work obtained 10 to 14 days prior to your next visit.  It is important to fast for 12 hours prior to routine weight loss surgery blood work, EXCEPT for drinking water, to ensure accuracy of results.    Please report nausea, vomiting, abdominal pain, or any other problems you experience to your surgeon.  For problems related to weight loss surgery, it is best to go to [facility] Emergency Department and have your surgeon paged.    Last Surgical Weight Loss:      1/11/2024     1:40 PM 6/18/2024     3:20 PM 8/23/2024     2:02 PM 9/17/2024     2:33 PM 10/15/2024     2:42 PM 11/14/2024     1:40 PM 3/6/2025     1:25 PM   Surgical Weight Loss Tracker   Consult Date   2/17/2021       Initial Height   5' 4\"       Initial Weight   250 lb       Initial BMI   42.91       Ideal Body Weight   151 lb       Surgery Date   11/15/2021       Pre-Surgical Height   5' 4\"       Pre-Surgical Weight   246 lb       Pre Surgery BMI   42.22       Weight to Lose   95 lb       Date 1/11/2024 6/18/2024 8/23/2024 9/17/2024 10/15/2024 11/14/2024 3/6/2025   Height 5' 4\" 5' 4\" 5' 4\" 5' 4\" 5' 4\" 5' 4\" 5' 4\"   Weight 194 lb 196 lb 195 lb 200 lb 200 lb 196 lb 194 lb   BMI 33.3 33.64 33.47 34.33 34.33 33.64 33.3   Weight Change -2 lb 2 lb -1 lb 5 lb 0 lb -4 lb -2 lb   Total Weight Change -52 lb -50 lb -51 lb -46 lb -46 lb -50 lb -52 lb   % EBWL 55% 53% 54% 48% 48% 53% 55%

## 2025-03-25 ENCOUNTER — OFFICE VISIT (OUTPATIENT)
Dept: FAMILY MEDICINE CLINIC | Age: 55
End: 2025-03-25
Payer: COMMERCIAL

## 2025-03-25 VITALS
DIASTOLIC BLOOD PRESSURE: 82 MMHG | TEMPERATURE: 97.8 F | BODY MASS INDEX: 33.84 KG/M2 | HEART RATE: 83 BPM | WEIGHT: 198.2 LBS | HEIGHT: 64 IN | SYSTOLIC BLOOD PRESSURE: 136 MMHG | RESPIRATION RATE: 16 BRPM | OXYGEN SATURATION: 96 %

## 2025-03-25 DIAGNOSIS — R73.03 PRE-DIABETES: ICD-10-CM

## 2025-03-25 DIAGNOSIS — R53.83 OTHER FATIGUE: ICD-10-CM

## 2025-03-25 DIAGNOSIS — R01.1 CARDIAC MURMUR: ICD-10-CM

## 2025-03-25 DIAGNOSIS — R05.1 ACUTE COUGH: ICD-10-CM

## 2025-03-25 DIAGNOSIS — G89.29 CHRONIC BILATERAL LOW BACK PAIN WITH BILATERAL SCIATICA: ICD-10-CM

## 2025-03-25 DIAGNOSIS — F17.210 CIGARETTE NICOTINE DEPENDENCE WITHOUT COMPLICATION: ICD-10-CM

## 2025-03-25 DIAGNOSIS — J01.90 ACUTE BACTERIAL SINUSITIS: ICD-10-CM

## 2025-03-25 DIAGNOSIS — B96.89 ACUTE BACTERIAL SINUSITIS: ICD-10-CM

## 2025-03-25 DIAGNOSIS — M54.42 CHRONIC BILATERAL LOW BACK PAIN WITH BILATERAL SCIATICA: ICD-10-CM

## 2025-03-25 DIAGNOSIS — E16.1 HYPERINSULINEMIA: ICD-10-CM

## 2025-03-25 DIAGNOSIS — Z12.31 BREAST CANCER SCREENING BY MAMMOGRAM: ICD-10-CM

## 2025-03-25 DIAGNOSIS — Z00.00 ENCOUNTER FOR WELL ADULT EXAM WITHOUT ABNORMAL FINDINGS: Primary | ICD-10-CM

## 2025-03-25 DIAGNOSIS — M54.41 CHRONIC BILATERAL LOW BACK PAIN WITH BILATERAL SCIATICA: ICD-10-CM

## 2025-03-25 LAB
CREATININE URINE: 134.4 MG/DL (ref 29–226)
HBA1C MFR BLD: 5.8 %
MICROALBUMIN/CREAT 24H UR: <12 MG/L (ref 0–19)
MICROALBUMIN/CREAT UR-RTO: NORMAL MCG/MG CREAT (ref 0–30)

## 2025-03-25 PROCEDURE — 3079F DIAST BP 80-89 MM HG: CPT | Performed by: NURSE PRACTITIONER

## 2025-03-25 PROCEDURE — 3075F SYST BP GE 130 - 139MM HG: CPT | Performed by: NURSE PRACTITIONER

## 2025-03-25 PROCEDURE — 99396 PREV VISIT EST AGE 40-64: CPT | Performed by: NURSE PRACTITIONER

## 2025-03-25 PROCEDURE — 83036 HEMOGLOBIN GLYCOSYLATED A1C: CPT | Performed by: NURSE PRACTITIONER

## 2025-03-25 RX ORDER — DEXTROMETHORPHAN HYDROBROMIDE AND PROMETHAZINE HYDROCHLORIDE 15; 6.25 MG/5ML; MG/5ML
5 SYRUP ORAL 4 TIMES DAILY PRN
Qty: 120 ML | Refills: 0 | Status: SHIPPED | OUTPATIENT
Start: 2025-03-25 | End: 2025-04-04

## 2025-03-25 RX ORDER — DOXYCYCLINE HYCLATE 100 MG
100 TABLET ORAL 2 TIMES DAILY
Qty: 20 TABLET | Refills: 0 | Status: SHIPPED | OUTPATIENT
Start: 2025-03-25 | End: 2025-04-04

## 2025-03-25 RX ORDER — MELOXICAM 7.5 MG/1
7.5 TABLET ORAL DAILY
Qty: 90 TABLET | Refills: 0 | Status: SHIPPED | OUTPATIENT
Start: 2025-03-25

## 2025-03-25 RX ORDER — VARENICLINE TARTRATE 0.5 MG/1
.5-1 TABLET, FILM COATED ORAL SEE ADMIN INSTRUCTIONS
Qty: 57 TABLET | Refills: 0 | Status: SHIPPED | OUTPATIENT
Start: 2025-03-25

## 2025-03-25 SDOH — ECONOMIC STABILITY: FOOD INSECURITY: WITHIN THE PAST 12 MONTHS, THE FOOD YOU BOUGHT JUST DIDN'T LAST AND YOU DIDN'T HAVE MONEY TO GET MORE.: NEVER TRUE

## 2025-03-25 SDOH — ECONOMIC STABILITY: FOOD INSECURITY: WITHIN THE PAST 12 MONTHS, YOU WORRIED THAT YOUR FOOD WOULD RUN OUT BEFORE YOU GOT MONEY TO BUY MORE.: NEVER TRUE

## 2025-03-25 ASSESSMENT — PATIENT HEALTH QUESTIONNAIRE - PHQ9
6. FEELING BAD ABOUT YOURSELF - OR THAT YOU ARE A FAILURE OR HAVE LET YOURSELF OR YOUR FAMILY DOWN: NOT AT ALL
4. FEELING TIRED OR HAVING LITTLE ENERGY: NOT AT ALL
SUM OF ALL RESPONSES TO PHQ QUESTIONS 1-9: 0
10. IF YOU CHECKED OFF ANY PROBLEMS, HOW DIFFICULT HAVE THESE PROBLEMS MADE IT FOR YOU TO DO YOUR WORK, TAKE CARE OF THINGS AT HOME, OR GET ALONG WITH OTHER PEOPLE: NOT DIFFICULT AT ALL
SUM OF ALL RESPONSES TO PHQ QUESTIONS 1-9: 0
9. THOUGHTS THAT YOU WOULD BE BETTER OFF DEAD, OR OF HURTING YOURSELF: NOT AT ALL
8. MOVING OR SPEAKING SO SLOWLY THAT OTHER PEOPLE COULD HAVE NOTICED. OR THE OPPOSITE, BEING SO FIGETY OR RESTLESS THAT YOU HAVE BEEN MOVING AROUND A LOT MORE THAN USUAL: NOT AT ALL
5. POOR APPETITE OR OVEREATING: NOT AT ALL
SUM OF ALL RESPONSES TO PHQ QUESTIONS 1-9: 0
SUM OF ALL RESPONSES TO PHQ QUESTIONS 1-9: 0
1. LITTLE INTEREST OR PLEASURE IN DOING THINGS: NOT AT ALL
2. FEELING DOWN, DEPRESSED OR HOPELESS: NOT AT ALL
3. TROUBLE FALLING OR STAYING ASLEEP: NOT AT ALL
7. TROUBLE CONCENTRATING ON THINGS, SUCH AS READING THE NEWSPAPER OR WATCHING TELEVISION: NOT AT ALL

## 2025-03-25 ASSESSMENT — ENCOUNTER SYMPTOMS
SHORTNESS OF BREATH: 0
VOMITING: 0
CHEST TIGHTNESS: 0
BACK PAIN: 1
ABDOMINAL PAIN: 0
SINUS PRESSURE: 1
NAUSEA: 0
CONSTIPATION: 0
BLOOD IN STOOL: 0
SORE THROAT: 0
COUGH: 1
DIARRHEA: 0
WHEEZING: 0

## 2025-03-25 NOTE — PATIENT INSTRUCTIONS
Well Visit, Ages 18 to 65: Care Instructions  Well visits can help you stay healthy. Your doctor has checked your overall health and may have suggested ways to take good care of yourself. Your doctor also may have recommended tests. You can help prevent illness with healthy eating, good sleep, vaccinations, regular exercise, and other steps.    Get the tests that you and your doctor decide on. Depending on your age and risks, examples might include screening for diabetes; hepatitis C; HIV; and cervical, breast, lung, and colon cancer. Screening helps find diseases before any symptoms appear.   Eat healthy foods. Choose fruits, vegetables, whole grains, lean protein, and low-fat dairy foods. Limit saturated fat and reduce salt.     Limit alcohol. Men should have no more than 2 drinks a day. Women should have no more than 1. For some people, no alcohol is the best choice.   Exercise. Get at least 30 minutes of exercise on most days of the week. Walking can be a good choice.     Reach and stay at your healthy weight. This will lower your risk for many health problems.   Take care of your mental health. Try to stay connected with friends, family, and community, and find ways to manage stress.     If you're feeling depressed or hopeless, talk to someone. A counselor can help. If you don't have a counselor, talk to your doctor.   Talk to your doctor if you think you may have a problem with alcohol or drug use. This includes prescription medicines, marijuana, and other drugs.     Avoid tobacco and nicotine: Don't smoke, vape, or chew. If you need help quitting, talk to your doctor.   Practice safer sex. Getting tested, using condoms or dental dams, and limiting sex partners can help prevent STIs.     Use birth control if it's important to you to prevent pregnancy. Talk with your doctor about your choices and what might be best for you.   Prevent problems where you can. Protect your skin from too much sun, wash your  no

## 2025-03-25 NOTE — PROGRESS NOTES
Well Adult Note  Name: Barb Judd Today’s Date: 3/27/2025   MRN: [id number] Sex: Female   Age: 54 y.o. Ethnicity: Non- / Non    : 1970 Race: White (non-)      Barb Judd is here for a well adult exam.    Chief Complaint   Patient presents with    Annual Exam     Been congested over a month              Assessment & Plan  1. Sinus congestion and cough.  - She has been experiencing sinus congestion and a persistent cough for over a month.  - Physical exam reveals swollen nasal tissue and clear lungs.  - Doxycycline 100 mg twice a day for 10 days is prescribed to address the chest congestion. Promethazine DM syrup is also prescribed for cough relief.  - Over-the-counter Mucinex is recommended to help break up mucus when the cough is wet. If symptoms do not improve, a chest x-ray will be considered.    2. Chronic low back pain.  - She continues to experience chronic low back pain radiating to both sciatic areas.  - Physical exam findings are consistent with chronic low back pain.  - A refill for meloxicam 7.5 mg daily is provided to manage the pain.  - No new treatments or referrals are needed at this time.    3. Prediabetes.  - Her A1c level is in the prediabetic range at 5.8.  - Current medication includes metformin 500 mg twice a day with meals.  - An A1c test will be conducted today to monitor her blood sugar levels.  - Fasting labs will be ordered to check TSH levels.    4. Heart murmur.  - A slight heart murmur was detected during the examination.  - It could be due to dehydration or anemia.  - No immediate cardiac testing is ordered. The murmur will be rechecked in 3 months.  - If it persists, an echocardiogram may be considered.    5. Medication management.  - She requested to restart Chantix for smoking cessation.  - Current medications include bupropion, buspirone, cyclobenzaprine, levothyroxine, lisinopril, metformin, pravastatin, topiramate, Vraylar, hydroxyzine, oxcarbazepine,

## 2025-03-27 PROBLEM — F17.210 CIGARETTE NICOTINE DEPENDENCE WITHOUT COMPLICATION: Status: ACTIVE | Noted: 2025-03-27

## 2025-03-27 PROBLEM — M54.41 CHRONIC BILATERAL LOW BACK PAIN WITH BILATERAL SCIATICA: Status: ACTIVE | Noted: 2025-03-27

## 2025-03-27 PROBLEM — M54.42 CHRONIC BILATERAL LOW BACK PAIN WITH BILATERAL SCIATICA: Status: ACTIVE | Noted: 2025-03-27

## 2025-03-27 PROBLEM — R73.03 PRE-DIABETES: Status: ACTIVE | Noted: 2025-03-27

## 2025-03-27 PROBLEM — G89.29 CHRONIC BILATERAL LOW BACK PAIN WITH BILATERAL SCIATICA: Status: ACTIVE | Noted: 2025-03-27

## 2025-04-01 ENCOUNTER — TELEPHONE (OUTPATIENT)
Dept: FAMILY MEDICINE CLINIC | Age: 55
End: 2025-04-01

## 2025-04-01 DIAGNOSIS — F17.210 CIGARETTE NICOTINE DEPENDENCE WITHOUT COMPLICATION: Primary | ICD-10-CM

## 2025-04-01 RX ORDER — VARENICLINE TARTRATE 0.5 (11)-1
KIT ORAL
Qty: 1 EACH | Refills: 0 | Status: SHIPPED | OUTPATIENT
Start: 2025-04-01

## 2025-04-01 NOTE — TELEPHONE ENCOUNTER
Patient states the chantix script sent to pharmacy is not covered under insurance. Her pharmacy said if it is the starter pack the insurance will cover. Patient questioning if we can send a new script for the starter pack for chantix instead.

## 2025-04-23 ENCOUNTER — TELEPHONE (OUTPATIENT)
Dept: FAMILY MEDICINE CLINIC | Age: 55
End: 2025-04-23

## 2025-04-23 NOTE — TELEPHONE ENCOUNTER
Called and left message for pt to call the office regarding scheduling her mammogram.    Electronically signed by NIKKO GARCIA MA on 4/23/2025 at 9:26 AM

## 2025-05-06 ENCOUNTER — HOSPITAL ENCOUNTER (OUTPATIENT)
Dept: MAMMOGRAPHY | Age: 55
Discharge: HOME | End: 2025-05-08
Payer: COMMERCIAL

## 2025-05-06 VITALS — BODY MASS INDEX: 35.08 KG/M2 | HEIGHT: 63 IN | WEIGHT: 198 LBS

## 2025-05-06 DIAGNOSIS — Z12.31 BREAST CANCER SCREENING BY MAMMOGRAM: ICD-10-CM

## 2025-05-06 PROCEDURE — 77063 BREAST TOMOSYNTHESIS BI: CPT

## 2025-05-08 ENCOUNTER — RESULTS FOLLOW-UP (OUTPATIENT)
Dept: FAMILY MEDICINE CLINIC | Age: 55
End: 2025-05-08

## 2025-05-23 ENCOUNTER — TELEPHONE (OUTPATIENT)
Dept: FAMILY MEDICINE CLINIC | Age: 55
End: 2025-05-23

## 2025-05-23 DIAGNOSIS — F17.210 CIGARETTE NICOTINE DEPENDENCE WITHOUT COMPLICATION: ICD-10-CM

## 2025-05-23 RX ORDER — VARENICLINE TARTRATE 0.5 (11)-1
KIT ORAL
Qty: 1 EACH | Refills: 1 | Status: SHIPPED | OUTPATIENT
Start: 2025-05-23

## 2025-05-23 NOTE — TELEPHONE ENCOUNTER
Pharmacy called and mentioned patients medication varenicline starter pack had a refill and this medication normally does not have a refill. They asked to remove the refill or if the provider had a specific reason for the refill to keep. Provider is out of office until Tuesday. I told pharmacy to remove the refill due to physician being out of office. I mentioned to pharmacist that if for any reason there is a need for a second script we can resubmit a new script.

## 2025-05-23 NOTE — TELEPHONE ENCOUNTER
Last seen 3/25/2025  Next appt 6/26/2025    Requested Prescriptions     Pending Prescriptions Disp Refills    Varenicline Tartrate, Starter, 0.5 MG X 11 & 1 MG X 42 TBPK [Pharmacy Med Name: Varenicline Tartrate (Starter) Oral Tablet Therapy Pack 0.5 MG X 11 & 1 MG X 42] 1 each 1     Sig: Follow package instructions      Name of Medication(s) Requested:  Requested Prescriptions     Pending Prescriptions Disp Refills    Varenicline Tartrate, Starter, 0.5 MG X 11 & 1 MG X 42 TBPK [Pharmacy Med Name: Varenicline Tartrate (Starter) Oral Tablet Therapy Pack 0.5 MG X 11 & 1 MG X 42] 1 each 1     Sig: Follow package instructions       Medication is on current medication list Yes    Dosage and directions were verified? Yes    Quantity verified: 90 day supply     Pharmacy Verified?  Yes    Last Appointment:  3/25/2025    Future appts:  Future Appointments   Date Time Provider Department Center   6/6/2025  1:15 PM Lacey Concepcion APRN - CNP Surg [facility]   6/26/2025  1:00 PM Mitzy Person APRN - CNP [facility] DEP   11/13/2025  1:30 PM Lacey Concepcion APRN - CNP Surg [facility]        (If no appt send self scheduling link. .REFILLAPPT)  Scheduling request sent?     [] Yes  [x] No    Does patient need updated?  [] Yes  [x] No

## 2025-05-27 RX ORDER — VARENICLINE TARTRATE 1 MG/1
1 TABLET, FILM COATED ORAL 2 TIMES DAILY
Qty: 60 TABLET | Refills: 0 | Status: SHIPPED | OUTPATIENT
Start: 2025-05-27

## 2025-05-27 NOTE — TELEPHONE ENCOUNTER
She's already done the starter dose. This script was supposed to be for continuation.    Name of Medication(s) Requested:  Requested Prescriptions     Pending Prescriptions Disp Refills    varenicline (CHANTIX) 1 MG tablet 60 tablet 0     Sig: Take 1 tablet by mouth 2 times daily       Medication is on current medication list Yes    Dosage and directions were verified? Yes    Quantity verified: 30 day supply     Pharmacy Verified?  Yes    Last Appointment:  3/25/2025    Future appts:  Future Appointments   Date Time Provider Department Center   6/6/2025  1:15 PM Lacey Concepcion APRN - CNP Surg [facility]   6/26/2025  1:00 PM Mitzy Person APRN - CNP [facility] DEP   11/13/2025  1:30 PM Lacey Concepcion APRN - CNP Surg Weight [facility]        (If no appt send self scheduling link. .REFILLAPPT)  Scheduling request sent?     [] Yes  [] No    Does patient need updated?  [] Yes  [] No

## 2025-06-06 ENCOUNTER — TELEPHONE (OUTPATIENT)
Age: 55
End: 2025-06-06

## 2025-06-10 DIAGNOSIS — K91.2 MALNUTRITION FOLLOWING GASTROINTESTINAL SURGERY: ICD-10-CM

## 2025-06-19 DIAGNOSIS — E66.811 CLASS 1 OBESITY DUE TO EXCESS CALORIES WITH BODY MASS INDEX (BMI) OF 34.0 TO 34.9 IN ADULT, UNSPECIFIED WHETHER SERIOUS COMORBIDITY PRESENT: ICD-10-CM

## 2025-06-19 DIAGNOSIS — M54.42 CHRONIC BILATERAL LOW BACK PAIN WITH BILATERAL SCIATICA: ICD-10-CM

## 2025-06-19 DIAGNOSIS — M54.41 CHRONIC BILATERAL LOW BACK PAIN WITH BILATERAL SCIATICA: ICD-10-CM

## 2025-06-19 DIAGNOSIS — G89.29 CHRONIC BILATERAL LOW BACK PAIN WITH BILATERAL SCIATICA: ICD-10-CM

## 2025-06-19 DIAGNOSIS — E66.09 CLASS 1 OBESITY DUE TO EXCESS CALORIES WITH BODY MASS INDEX (BMI) OF 34.0 TO 34.9 IN ADULT, UNSPECIFIED WHETHER SERIOUS COMORBIDITY PRESENT: ICD-10-CM

## 2025-06-20 RX ORDER — MELOXICAM 7.5 MG/1
7.5 TABLET ORAL DAILY
Qty: 90 TABLET | Refills: 0 | Status: SHIPPED | OUTPATIENT
Start: 2025-06-20

## 2025-06-20 NOTE — TELEPHONE ENCOUNTER
Name of Medication(s) Requested:  Requested Prescriptions     Pending Prescriptions Disp Refills    meloxicam (MOBIC) 7.5 MG tablet [Pharmacy Med Name: Meloxicam Oral Tablet 7.5 MG] 90 tablet 0     Sig: Take 1 tablet by mouth daily       Medication is on current medication list Yes    Dosage and directions were verified? Yes    Quantity verified: 90 day supply     Pharmacy Verified?  Yes    Last Appointment:  3/25/2025    Future appts:  Future Appointments   Date Time Provider Department Center   6/26/2025  1:00 PM Mitzy Person APRN - CNP [facility]   11/13/2025  1:30 PM Lacey Concepcion APRN - CNP [facility]        (If no appt send self scheduling link. .REFILLAPPT)  Scheduling request sent?     [] Yes  [x] No    Does patient need updated?  [] Yes  [x] No

## 2025-06-23 RX ORDER — TOPIRAMATE 100 MG/1
200 TABLET, FILM COATED ORAL DAILY
Qty: 60 TABLET | Refills: 0 | Status: SHIPPED | OUTPATIENT
Start: 2025-06-23

## 2025-07-30 ENCOUNTER — TELEPHONE (OUTPATIENT)
Dept: FAMILY MEDICINE CLINIC | Age: 55
End: 2025-07-30

## 2025-08-01 ENCOUNTER — INITIAL CONSULT (OUTPATIENT)
Age: 55
End: 2025-08-01
Payer: COMMERCIAL

## 2025-08-01 VITALS
DIASTOLIC BLOOD PRESSURE: 79 MMHG | BODY MASS INDEX: 35.79 KG/M2 | TEMPERATURE: 98.2 F | HEIGHT: 63 IN | SYSTOLIC BLOOD PRESSURE: 142 MMHG | HEART RATE: 90 BPM | WEIGHT: 202 LBS | RESPIRATION RATE: 20 BRPM

## 2025-08-01 DIAGNOSIS — R10.13 EPIGASTRIC PAIN: Primary | ICD-10-CM

## 2025-08-01 PROBLEM — Z98.84 STATUS POST BARIATRIC SURGERY: Status: ACTIVE | Noted: 2025-08-01

## 2025-08-01 PROCEDURE — 99212 OFFICE O/P EST SF 10 MIN: CPT | Performed by: SURGERY

## 2025-08-01 RX ORDER — OMEPRAZOLE 20 MG/1
20 CAPSULE, DELAYED RELEASE ORAL
Qty: 60 CAPSULE | Refills: 0 | Status: SHIPPED | OUTPATIENT
Start: 2025-08-01

## 2025-08-01 RX ORDER — SUCRALFATE 1 G/1
0.5 TABLET ORAL 4 TIMES DAILY
Qty: 60 TABLET | Refills: 3 | Status: SHIPPED | OUTPATIENT
Start: 2025-08-01

## 2025-08-01 NOTE — PROGRESS NOTES
Barb Judd  8/1/2025  [facility]    Randy-en- Y Gastric Bypass  4 Year Post-Operative Follow-up     Barb Judd is a 55 y.o. female who is 4 years post Laparoscopic Randy-en-Y Gastric Bypass surgery.  Reports Epigastric pain. She is not having swallowing difficulty, is compliant most of the time with the multivitamins and calcium + Vit D. She is meeting fluid recommendations of at least 64 ounces per day and is meeting protein recommendations. She  is exercising: walking 30 minutes/day- 3 days/week.    Weight=91.6 kg (202 lb)  Today's weight represents a total weight loss of 44 pounds since surgery with 10 pounds regained since the lowest weight.    Last Surgical Weight Loss:      8/1/2025    11:22 AM 3/6/2025     1:25 PM 11/14/2024     1:40 PM 10/15/2024     2:42 PM 9/17/2024     2:33 PM 8/23/2024     2:02 PM 6/18/2024     3:20 PM   Surgical Weight Loss Tracker   Date 2/17/2021 2/17/2021    Visit Type  New Consult         Height 5' 3\"     5' 4\"    Initial Weight 250 lb     250 lb    Initial BMI 44.3     42.91    Ideal Body Weight 151 lb     151 lb    Initial Excess Body Weight (EBW) 99 lb         Surgery Date 11/15/2021     11/15/2021    Pre-Surgical Weight 246 lb     246 lb    Pre Surgery BMI 43.6     42.22    Preop Weight Change 4 lb         Preop % Weight Change 2%         Pre-Surgical EBW 5 lb 15 oz         Date 8/1/2025 3/6/2025 11/14/2024 10/15/2024 9/17/2024 8/23/2024 6/18/2024   Weight 202 lb 194 lb 196 lb 200 lb 200 lb 195 lb 196 lb   BMI 35.8 33.3 33.64 34.33 34.33 33.47 33.64   Wt Change Since Last Visit 8 lb -2 lb -4 lb 0 lb 5 lb -1 lb 2 lb   Total Wt Change Since Surgery -44 lb -52 lb -50 lb -46 lb -46 lb -51 lb -50 lb   % EBWL 46% 55% 53% 48% 48% 54% 53%   % Total Body Wt Loss 18%               Prior to Admission medications    Medication Sig Start Date End Date Taking? Authorizing Provider   topiramate (TOPAMAX) 100 MG tablet TAKE TWO TABLETS BY MOUTH DAILY 6/23/25

## 2025-08-01 NOTE — PATIENT INSTRUCTIONS
Please continue to take your vitamin and mineral supplements as instructed.      If you received a blood work prescription today for laboratory monitoring due prior to your next routine follow-up visit, please have this blood work obtained 10 to 14 days prior to your next visit.  It is important to fast for 12 hours prior to routine weight loss surgery blood work, EXCEPT for drinking water, to ensure accuracy of results.    Please report nausea, vomiting, abdominal pain, or any other problems you experience to your surgeon.  For problems related to weight loss surgery, it is best to go to [facility] Emergency Department and have your surgeon paged.    Last Surgical Weight Loss:      8/1/2025    11:22 AM 3/6/2025     1:25 PM 11/14/2024     1:40 PM 10/15/2024     2:42 PM 9/17/2024     2:33 PM 8/23/2024     2:02 PM 6/18/2024     3:20 PM   Surgical Weight Loss Tracker   Date 2/17/2021 2/17/2021    Visit Type  New Consult         Height 5' 3\"     5' 4\"    Initial Weight 250 lb     250 lb    Initial BMI 44.3     42.91    Ideal Body Weight 151 lb     151 lb    Initial Excess Body Weight (EBW) 99 lb         Surgery Date 11/15/2021     11/15/2021    Pre-Surgical Weight 246 lb     246 lb    Pre Surgery BMI 43.6     42.22    Preop Weight Change 4 lb         Preop % Weight Change 2%         Pre-Surgical EBW 5 lb 15 oz         Date 8/1/2025 3/6/2025 11/14/2024 10/15/2024 9/17/2024 8/23/2024 6/18/2024   Weight 202 lb 194 lb 196 lb 200 lb 200 lb 195 lb 196 lb   BMI 35.8 33.3 33.64 34.33 34.33 33.47 33.64   Wt Change Since Last Visit 8 lb -2 lb -4 lb 0 lb 5 lb -1 lb 2 lb   Total Wt Change Since Surgery -44 lb -52 lb -50 lb -46 lb -46 lb -51 lb -50 lb   % EBWL 46% 55% 53% 48% 48% 54% 53%   % Total Body Wt Loss 18%

## 2025-08-14 ENCOUNTER — HOSPITAL ENCOUNTER (OUTPATIENT)
Age: 55
Setting detail: OUTPATIENT SURGERY
Discharge: HOME | End: 2025-08-14
Attending: SURGERY | Admitting: SURGERY
Payer: COMMERCIAL

## 2025-08-14 ENCOUNTER — ANESTHESIA EVENT (OUTPATIENT)
Dept: ENDOSCOPY | Age: 55
End: 2025-08-14
Payer: COMMERCIAL

## 2025-08-14 ENCOUNTER — ANESTHESIA (OUTPATIENT)
Dept: ENDOSCOPY | Age: 55
End: 2025-08-14
Payer: COMMERCIAL

## 2025-08-14 VITALS
SYSTOLIC BLOOD PRESSURE: 124 MMHG | OXYGEN SATURATION: 98 % | TEMPERATURE: 96.8 F | RESPIRATION RATE: 16 BRPM | BODY MASS INDEX: 36.14 KG/M2 | WEIGHT: 204 LBS | HEART RATE: 69 BPM | DIASTOLIC BLOOD PRESSURE: 75 MMHG | HEIGHT: 63 IN

## 2025-08-14 DIAGNOSIS — Z98.84 STATUS POST BARIATRIC SURGERY: ICD-10-CM

## 2025-08-14 DIAGNOSIS — R10.13 EPIGASTRIC PAIN: ICD-10-CM

## 2025-08-14 PROCEDURE — 7100000011 HC PHASE II RECOVERY - ADDTL 15 MIN: Performed by: SURGERY

## 2025-08-14 PROCEDURE — 3700000000 HC ANESTHESIA ATTENDED CARE: Performed by: SURGERY

## 2025-08-14 PROCEDURE — 6360000002 HC RX W HCPCS: Performed by: NURSE ANESTHETIST, CERTIFIED REGISTERED

## 2025-08-14 PROCEDURE — 7100000010 HC PHASE II RECOVERY - FIRST 15 MIN: Performed by: SURGERY

## 2025-08-14 PROCEDURE — 88305 TISSUE EXAM BY PATHOLOGIST: CPT

## 2025-08-14 PROCEDURE — 43239 EGD BIOPSY SINGLE/MULTIPLE: CPT | Performed by: SURGERY

## 2025-08-14 PROCEDURE — 2709999900 HC NON-CHARGEABLE SUPPLY: Performed by: SURGERY

## 2025-08-14 PROCEDURE — 2580000003 HC RX 258: Performed by: SURGERY

## 2025-08-14 PROCEDURE — 3609012400 HC EGD TRANSORAL BIOPSY SINGLE/MULTIPLE: Performed by: SURGERY

## 2025-08-14 RX ORDER — SODIUM CHLORIDE 9 MG/ML
INJECTION, SOLUTION INTRAVENOUS PRN
Status: DISCONTINUED | OUTPATIENT
Start: 2025-08-14 | End: 2025-08-14 | Stop reason: HOSPADM

## 2025-08-14 RX ORDER — SODIUM CHLORIDE 0.9 % (FLUSH) 0.9 %
5-40 SYRINGE (ML) INJECTION PRN
Status: DISCONTINUED | OUTPATIENT
Start: 2025-08-14 | End: 2025-08-14 | Stop reason: HOSPADM

## 2025-08-14 RX ORDER — SODIUM CHLORIDE 9 MG/ML
INJECTION, SOLUTION INTRAVENOUS CONTINUOUS
Status: DISCONTINUED | OUTPATIENT
Start: 2025-08-14 | End: 2025-08-14 | Stop reason: HOSPADM

## 2025-08-14 RX ORDER — PROPOFOL 10 MG/ML
INJECTION, EMULSION INTRAVENOUS
Status: DISCONTINUED | OUTPATIENT
Start: 2025-08-14 | End: 2025-08-14 | Stop reason: SDUPTHER

## 2025-08-14 RX ORDER — SODIUM CHLORIDE 0.9 % (FLUSH) 0.9 %
5-40 SYRINGE (ML) INJECTION EVERY 12 HOURS SCHEDULED
Status: DISCONTINUED | OUTPATIENT
Start: 2025-08-14 | End: 2025-08-14 | Stop reason: HOSPADM

## 2025-08-14 RX ADMIN — SODIUM CHLORIDE: 0.9 INJECTION, SOLUTION INTRAVENOUS at 11:53

## 2025-08-14 RX ADMIN — PROPOFOL 160 MG: 10 INJECTION, EMULSION INTRAVENOUS at 13:19

## 2025-08-14 ASSESSMENT — PAIN DESCRIPTION - DESCRIPTORS: DESCRIPTORS: DISCOMFORT

## 2025-08-14 ASSESSMENT — LIFESTYLE VARIABLES: SMOKING_STATUS: 1

## 2025-08-14 ASSESSMENT — PAIN SCALES - GENERAL: PAINLEVEL_OUTOF10: 0

## 2025-08-14 ASSESSMENT — PAIN - FUNCTIONAL ASSESSMENT: PAIN_FUNCTIONAL_ASSESSMENT: 0-10

## 2025-08-19 LAB — SURGICAL PATHOLOGY REPORT: NORMAL

## 2025-08-28 ENCOUNTER — HOSPITAL ENCOUNTER (OUTPATIENT)
Dept: ULTRASOUND IMAGING | Age: 55
Discharge: HOME | End: 2025-08-28
Payer: COMMERCIAL

## 2025-08-28 DIAGNOSIS — R10.13 EPIGASTRIC PAIN: ICD-10-CM

## 2025-08-28 PROCEDURE — 76705 ECHO EXAM OF ABDOMEN: CPT

## 2025-08-29 ENCOUNTER — OFFICE VISIT (OUTPATIENT)
Age: 55
End: 2025-08-29
Payer: COMMERCIAL

## 2025-08-29 VITALS
BODY MASS INDEX: 35.79 KG/M2 | DIASTOLIC BLOOD PRESSURE: 75 MMHG | WEIGHT: 202 LBS | SYSTOLIC BLOOD PRESSURE: 114 MMHG | HEART RATE: 85 BPM | TEMPERATURE: 98.1 F | RESPIRATION RATE: 20 BRPM | HEIGHT: 63 IN

## 2025-08-29 DIAGNOSIS — R10.13 EPIGASTRIC PAIN: ICD-10-CM

## 2025-08-29 DIAGNOSIS — K28.9 MARGINAL ULCER: Primary | ICD-10-CM

## 2025-08-29 PROCEDURE — 99212 OFFICE O/P EST SF 10 MIN: CPT | Performed by: SURGERY

## 2025-08-29 RX ORDER — OMEPRAZOLE 20 MG/1
20 CAPSULE, DELAYED RELEASE ORAL
Qty: 60 CAPSULE | Refills: 5 | Status: SHIPPED | OUTPATIENT
Start: 2025-08-29

## (undated) DEVICE — AIRLIFE™ ADULT OXYGEN MASK VINYL, UNDER THE CHIN STYLE, 3 IN 1 MASK WITH SAFETY VENT, WITH 7 FEET (2.1 M) CRUSH RESISTANT OXYGEN TUBING: Brand: AIRLIFE™

## (undated) DEVICE — PITCHER PT 1200ML W HNDL CSR WRP

## (undated) DEVICE — BAG POLYETH 9X6IN 4 WALL ZIPLK BIOHAZ

## (undated) DEVICE — SHEARS LAP L45CM DIA5MM ULTRASONIC CRV TIP ADV HEMSTAS HARM

## (undated) DEVICE — TIP SUCTION TRANSPAR RIB SURF STD BLB RIG  YANKAUER

## (undated) DEVICE — STAPLER SKIN L440MM 32MM LNG 12 FIRING B FRM PWR + GRIPPING

## (undated) DEVICE — TROCAR: Brand: KII FIOS FIRST ENTRY

## (undated) DEVICE — PUMP SUC IRR TBNG L10FT W/ HNDPC ASSEMB STRYKEFLOW 2

## (undated) DEVICE — Z INACTIVE USE 2660664 SOLUTION IRRIG 3000ML 0.9% SOD CHL USP UROMATIC PLAS CONT

## (undated) DEVICE — GOWN ISOLATN REG YEL M WT MULTIPLY SIDETIE LEV 2

## (undated) DEVICE — SOLUTION IV IRRIG POUR BRL 0.9% SODIUM CHL 2F7124

## (undated) DEVICE — COVER,LIGHT HANDLE,FLX,1/PK: Brand: MEDLINE INDUSTRIES, INC.

## (undated) DEVICE — GARMENT,MEDLINE,DVT,INT,CALF,MED, GEN2: Brand: MEDLINE

## (undated) DEVICE — KIT BEDSIDE REVITAL OX 500ML

## (undated) DEVICE — BLOCK BITE 60FR CAREGUARD

## (undated) DEVICE — RELOAD STPL L60MM H1-2.6MM MESENTERY THN TISS WHT 6 ROW

## (undated) DEVICE — SYRINGE 20ML LL S/C 50

## (undated) DEVICE — CONTAINER SPEC COLL 960ML POLYPR TRIANG GRAD INTAKE/OUTPUT

## (undated) DEVICE — KENDALL 450 SERIES MONITORING FOAM ELECTRODE - RECTANGULAR SHAPE ( 3/PK): Brand: KENDALL

## (undated) DEVICE — SUTURE ABSRB L6IN L37MM 0 GS-21 GRN 1/2 CIR TAPR PNT NDL VLOCL0306

## (undated) DEVICE — 6 X 9  1.75MIL 4-WALL LABGUARD: Brand: MINIGRIP COMMERCIAL LLC

## (undated) DEVICE — LUBRICANT SURG JELLY ST BACTER TUBE 4.25OZ

## (undated) DEVICE — FORCEPS BX L240CM JAW DIA2.8MM L CAP W/ NDL MIC MESH TOOTH

## (undated) DEVICE — CONTAINER SPEC 480ML CLR POLYSTYR 10% NEUT BUFF FRMLN ZN

## (undated) DEVICE — MEDI-VAC NON-CONDUCTIVE SUCTION TUBING: Brand: CARDINAL HEALTH

## (undated) DEVICE — PACK SURG LAP CHOLE CUSTOM

## (undated) DEVICE — VALVE SUCTION AIR H2O HYDR H2O JET CONN STRL ORCA POD + DISP

## (undated) DEVICE — NDL CNTR 40CT FM MAG: Brand: MEDLINE INDUSTRIES, INC.

## (undated) DEVICE — LAPAROSCOPIC SCISSORS: Brand: EPIX LAPAROSCOPIC SCISSORS

## (undated) DEVICE — SYRINGE MED 10ML TRNSLUC BRL PLUNG BLK MRK POLYPR CTRL

## (undated) DEVICE — TOWEL,OR,DSP,ST,BLUE,STD,6/PK,12PK/CS: Brand: MEDLINE

## (undated) DEVICE — APPLIER CLP L SHFT DIA12MM 20 ROT MULT LIGACLP

## (undated) DEVICE — RELOAD STPL L60MM H1.5-3.6MM REG TISS BLU GRIPPING SURF B

## (undated) DEVICE — SPONGE GZ 4IN 4IN 4 PLY N WVN AVANT

## (undated) DEVICE — CAMERA STRYKER 1488 HD GEN

## (undated) DEVICE — SUTURE V-LOC 180 SZ 0 L9IN ABSRB GRN GS-21 L37MM 1/2 CIR VLOCL0346

## (undated) DEVICE — GLOVE ORANGE PI 7 1/2   MSG9075

## (undated) DEVICE — IRON INTERN

## (undated) DEVICE — APPLICATOR MEDICATED 26 CC SOLUTION HI LT ORNG CHLORAPREP

## (undated) DEVICE — [HIGH FLOW INSUFFLATOR,  DO NOT USE IF PACKAGE IS DAMAGED,  KEEP DRY,  KEEP AWAY FROM SUNLIGHT,  PROTECT FROM HEAT AND RADIOACTIVE SOURCES.]: Brand: PNEUMOSURE

## (undated) DEVICE — EXTRA LONG 45 DEGREE LENS

## (undated) DEVICE — GLOVE ORANGE PI 8   MSG9080

## (undated) DEVICE — MANIFOLD SUCT 4 PRT 2 CANSTR FLTR DISP NEPTUNE 2

## (undated) DEVICE — TUBING, SUCTION, 1/4" X 10', STRAIGHT: Brand: MEDLINE

## (undated) DEVICE — COVER,TABLE,44X90,STERILE: Brand: MEDLINE

## (undated) DEVICE — SET ENDO INSTR LAPAROSCOPIC INCISIONAL

## (undated) DEVICE — PMI PTFE COATED LAPAROSCOPIC WIRE L-HOOK 44 CM: Brand: PMI

## (undated) DEVICE — MEDI-VAC YANKAUER SUCTION HANDLE W/BULBOUS TIP: Brand: CARDINAL HEALTH

## (undated) DEVICE — DOUBLE BASIN SET: Brand: MEDLINE INDUSTRIES, INC.

## (undated) DEVICE — ELECTRODE PT RET AD L9FT HI MOIST COND ADH HYDRGEL CORDED

## (undated) DEVICE — MASK,FACE,MAXFLUIDPROTECT,SHIELD/ERLPS: Brand: MEDLINE

## (undated) DEVICE — ADAPTER CLEANING PORPOISE CLEANING

## (undated) DEVICE — PLUMEPORT LAPAROSCOPIC SMOKE FILTRATION DEVICE: Brand: PLUMEPORT ACTIV

## (undated) DEVICE — APPLICATOR SURG XL L38CM FOR ARISTA ABSRB HEMSTAT FLEXITIP

## (undated) DEVICE — TUBING SCTION CONN 1/4X10 RIB

## (undated) DEVICE — NEEDLE SPNL 22GA L3.5IN BLK HUB S STL REG WALL FIT STYL W/

## (undated) DEVICE — MARKER,SKIN,WI/RULER AND LABELS: Brand: MEDLINE

## (undated) DEVICE — ELECTRODE ECG MONITORING AD CONDUCTIVE ADH MONITOR MEDTRCE

## (undated) DEVICE — GOWN,SIRUS,NONRNF,SETINSLV,XL,20/CS: Brand: MEDLINE

## (undated) DEVICE — TROCAR: Brand: KII SLEEVE

## (undated) DEVICE — NEEDLE HYPO 25GA L1.5IN BLU POLYPR HUB S STL REG BVL STR